# Patient Record
Sex: FEMALE | Race: WHITE | NOT HISPANIC OR LATINO | Employment: PART TIME | URBAN - METROPOLITAN AREA
[De-identification: names, ages, dates, MRNs, and addresses within clinical notes are randomized per-mention and may not be internally consistent; named-entity substitution may affect disease eponyms.]

---

## 2017-04-03 ENCOUNTER — HOSPITAL ENCOUNTER (OUTPATIENT)
Dept: RADIOLOGY | Facility: HOSPITAL | Age: 72
Discharge: HOME | End: 2017-04-03
Attending: OBSTETRICS & GYNECOLOGY
Payer: COMMERCIAL

## 2017-04-03 DIAGNOSIS — Z12.31 ENCOUNTER FOR SCREENING MAMMOGRAM FOR MALIGNANT NEOPLASM OF BREAST: ICD-10-CM

## 2017-04-03 PROCEDURE — G0202 SCR MAMMO BI INCL CAD: HCPCS

## 2017-06-12 ENCOUNTER — ALLSCRIPTS OFFICE VISIT (OUTPATIENT)
Dept: OTHER | Facility: OTHER | Age: 72
End: 2017-06-12

## 2017-06-12 DIAGNOSIS — R04.2 HEMOPTYSIS: ICD-10-CM

## 2017-06-19 ENCOUNTER — HOSPITAL ENCOUNTER (OUTPATIENT)
Dept: RADIOLOGY | Facility: HOSPITAL | Age: 72
Discharge: HOME | End: 2017-06-19
Payer: COMMERCIAL

## 2017-06-19 ENCOUNTER — TRANSCRIBE ORDERS (OUTPATIENT)
Dept: RADIOLOGY | Facility: HOSPITAL | Age: 72
End: 2017-06-19

## 2017-06-19 DIAGNOSIS — R04.2 COUGHING UP BLOOD: Primary | ICD-10-CM

## 2017-06-19 DIAGNOSIS — R04.2 COUGHING UP BLOOD: ICD-10-CM

## 2017-06-19 PROCEDURE — 71020 HB CHEST X-RAY 2VW FRONTAL&LATL: CPT

## 2017-06-27 ENCOUNTER — LAB CONVERSION - ENCOUNTER (OUTPATIENT)
Dept: OTHER | Facility: OTHER | Age: 72
End: 2017-06-27

## 2017-06-27 LAB
A/G RATIO (HISTORICAL): 1.3 (CALC) (ref 1–2.5)
ALBUMIN SERPL BCP-MCNC: 4.5 G/DL (ref 3.6–5.1)
ALP SERPL-CCNC: 80 U/L (ref 33–130)
ALT SERPL W P-5'-P-CCNC: 11 U/L (ref 6–29)
AST SERPL W P-5'-P-CCNC: 21 U/L (ref 10–35)
BASOPHILS # BLD AUTO: 0.4 %
BASOPHILS # BLD AUTO: 27 CELLS/UL (ref 0–200)
BILIRUB SERPL-MCNC: 0.7 MG/DL (ref 0.2–1.2)
BUN SERPL-MCNC: 11 MG/DL (ref 7–25)
BUN/CREA RATIO (HISTORICAL): NORMAL (CALC) (ref 6–22)
CALCIUM SERPL-MCNC: 9.7 MG/DL (ref 8.6–10.4)
CHLORIDE SERPL-SCNC: 103 MMOL/L (ref 98–110)
CHOLEST SERPL-MCNC: 220 MG/DL (ref 125–200)
CHOLEST/HDLC SERPL: 2.7 (CALC)
CO2 SERPL-SCNC: 28 MMOL/L (ref 20–31)
CREAT SERPL-MCNC: 0.64 MG/DL (ref 0.6–0.93)
DEPRECATED RDW RBC AUTO: 14.6 % (ref 11–15)
EGFR AFRICAN AMERICAN (HISTORICAL): 105 ML/MIN/1.73M2
EGFR-AMERICAN CALC (HISTORICAL): 90 ML/MIN/1.73M2
EOSINOPHIL # BLD AUTO: 1.3 %
EOSINOPHIL # BLD AUTO: 88 CELLS/UL (ref 15–500)
GAMMA GLOBULIN (HISTORICAL): 3.5 G/DL (CALC) (ref 1.9–3.7)
GLUCOSE (HISTORICAL): 84 MG/DL (ref 65–99)
HCT VFR BLD AUTO: 43 % (ref 35–45)
HDLC SERPL-MCNC: 82 MG/DL
HGB BLD-MCNC: 14.1 G/DL (ref 11.7–15.5)
LDL CHOLESTEROL (HISTORICAL): 118 MG/DL (CALC)
LYMPHOCYTES # BLD AUTO: 2033 CELLS/UL (ref 850–3900)
LYMPHOCYTES # BLD AUTO: 29.9 %
MCH RBC QN AUTO: 32 PG (ref 27–33)
MCHC RBC AUTO-ENTMCNC: 32.9 G/DL (ref 32–36)
MCV RBC AUTO: 97.4 FL (ref 80–100)
MONOCYTES # BLD AUTO: 530 CELLS/UL (ref 200–950)
MONOCYTES (HISTORICAL): 7.8 %
NEUTROPHILS # BLD AUTO: 4121 CELLS/UL (ref 1500–7800)
NEUTROPHILS # BLD AUTO: 60.6 %
NON-HDL-CHOL (CHOL-HDL) (HISTORICAL): 138 MG/DL (CALC)
PLATELET # BLD AUTO: 289 THOUSAND/UL (ref 140–400)
PMV BLD AUTO: 8.2 FL (ref 7.5–12.5)
POTASSIUM SERPL-SCNC: 3.9 MMOL/L (ref 3.5–5.3)
RBC # BLD AUTO: 4.42 MILLION/UL (ref 3.8–5.1)
SODIUM SERPL-SCNC: 141 MMOL/L (ref 135–146)
TOTAL PROTEIN (HISTORICAL): 8 G/DL (ref 6.1–8.1)
TRIGL SERPL-MCNC: 100 MG/DL
TSH SERPL DL<=0.05 MIU/L-ACNC: 0.55 MIU/L (ref 0.4–4.5)
WBC # BLD AUTO: 6.8 THOUSAND/UL (ref 3.8–10.8)

## 2017-06-28 ENCOUNTER — GENERIC CONVERSION - ENCOUNTER (OUTPATIENT)
Dept: OTHER | Facility: OTHER | Age: 72
End: 2017-06-28

## 2018-01-14 VITALS
RESPIRATION RATE: 16 BRPM | SYSTOLIC BLOOD PRESSURE: 120 MMHG | HEIGHT: 63 IN | WEIGHT: 111 LBS | HEART RATE: 70 BPM | TEMPERATURE: 98.1 F | BODY MASS INDEX: 19.67 KG/M2 | DIASTOLIC BLOOD PRESSURE: 60 MMHG

## 2018-01-15 NOTE — MISCELLANEOUS
Message   Recorded as Task   Date: 05/02/2016 02:11 PM, Created By: Annabella Phillip   Task Name: Go to Result   Assigned To: ALANNA GYN,Team   Regarding Patient: Ginny Malone, Status: In Progress   Comment:    Kiran Akhtarkana - 02 May 2016 2:11 PM     TASK CREATED  Please notify patient that her DEXA results showed osteopenia  We recommend weight-bearing exercise, calcium supplementation, and vitamin D supplementation  Kat Reed - 02 May 2016 2:22 PM     TASK IN PROGRESS   Kat Reed - 02 May 2016 2:24 PM     TASK EDITED  spoke with  (uche) on [facility] - he wrote down instructions for pt to start taking calcium and vit d    pt will call with any further questions        Active Problems    1  Anxiety (300 00) (F41 9)   2  Back pain (724 5) (M54 9)   3  Cataract (366 9) (H26 9)   4  Colon cancer screening (V76 51) (Z12 11)   5  Encounter for routine gynecological examination (V72 31) (Z01 419)   6  Encounter for screening mammogram for malignant neoplasm of breast (V76 12)   (Z12 31)   7  Eye pressure (379 99) (H57 9)   8  Hypertension (401 9) (I10)   9  Hypothyroidism (244 9) (E03 9)   10  Menopause (627 2) (Z78 0)   11  Sciatica (724 3) (M54 30)   12  Telangiectasias (448 9) (I78 1)   13  Visit for eye and vision exam (V72 0) (Z01 00)    Current Meds   1  Alphagan P 0 1 % Ophthalmic Solution; Therapy: (USSOBLBF:27AYU0572) to Recorded   2  ALPRAZolam 0 5 MG Oral Tablet; TAKE ONE (1) TABLET(S) TWICE DAILY ASNEEDED; Therapy: 55QBT7932 to (Evaluate:20Mar2016); Last Rx:74Cmy0044 Ordered   3  AmLODIPine Besylate 5 MG Oral Tablet; take 1 tablet by mouth once daily; Therapy: 14VRI3011 to (Evaluate:98Kyd1455)  Requested for: 84PIP9414; Last   Rx:[id number] Ordered   4  Citalopram Hydrobromide 10 MG Oral Tablet (CeleXA); TAKE 1 TABLET DAILY    Requested for: 95WMH9114; Last Rx:04Mar2016 Ordered   5  [facility] Vitamin E CAPS Recorded   6   Levoxyl 112 MCG Oral Tablet; TAKE 1 TABLET DAILY AS DIRECTED; Therapy: 33LPY5529 to (Evaluate:12Nrb7085)  Requested for: 44Qzu9652; Last   Rx:63Zyd8539 Ordered   7  Lumigan 0 01 % Ophthalmic Solution; Therapy: (LTYSODBC:87XFP4639) to Recorded   8  Multivitamins TABS Recorded   9  Tretinoin 0 05 % External Cream; use once daily at bedtime; Therapy: 65Sok6198 to (Last Rx:01Fnm9849)  Requested for: 53WFM2139 Ordered    Allergies    1   No Known Drug Allergies    Signatures   Electronically signed by : Jessica Akhtar, ; May  2 2016  2:24PM EST                       (Author)

## 2018-01-17 NOTE — RESULT NOTES
Discussion/Summary   no significant changes on bw     Verified Results  (1) CBC/PLT/DIFF 26AZW6532 56:50KJ Marino Mayen     Test Name Result Flag Reference   WHITE BLOOD CELL COUNT 6 8 Thousand/uL  3 8-10 8   RED BLOOD CELL COUNT 4 42 Million/uL  3 80-5 10   HEMOGLOBIN 14 1 g/dL  11 7-15 5   HEMATOCRIT 43 0 %  35 0-45 0   MCV 97 4 fL  80 0-100 0   MCH 32 0 pg  27 0-33 0   MCHC 32 9 g/dL  32 0-36 0   RDW 14 6 %  11 0-15 0   PLATELET COUNT 084 Thousand/uL  140-400   ABSOLUTE NEUTROPHILS 4121 cells/uL  5778-9754   ABSOLUTE LYMPHOCYTES 2033 cells/uL  850-3900   ABSOLUTE MONOCYTES 530 cells/uL  200-950   ABSOLUTE EOSINOPHILS 88 cells/uL     ABSOLUTE BASOPHILS 27 cells/uL  0-200   NEUTROPHILS 60 6 %     LYMPHOCYTES 29 9 %     MONOCYTES 7 8 %     EOSINOPHILS 1 3 %     BASOPHILS 0 4 %     MPV 8 2 fL  7 5-12 5     (1) COMPREHENSIVE METABOLIC PANEL 18UOL5946 21:96SF Marino Mayen     Test Name Result Flag Reference   GLUCOSE 84 mg/dL  65-99   Fasting reference interval   UREA NITROGEN (BUN) 11 mg/dL  7-25   CREATININE 0 64 mg/dL  0 60-0 93   For patients >52years of age, the reference limit  for Creatinine is approximately 13% higher for people  identified as -American  eGFR NON-AFR   AMERICAN 90 mL/min/1 73m2  > OR = 60   eGFR AFRICAN AMERICAN 105 mL/min/1 73m2  > OR = 60   BUN/CREATININE RATIO   2-98   NOT APPLICABLE (calc)   SODIUM 141 mmol/L  135-146   POTASSIUM 3 9 mmol/L  3 5-5 3   CHLORIDE 103 mmol/L     CARBON DIOXIDE 28 mmol/L  20-31   CALCIUM 9 7 mg/dL  8 6-10 4   PROTEIN, TOTAL 8 0 g/dL  6 1-8 1   ALBUMIN 4 5 g/dL  3 6-5 1   GLOBULIN 3 5 g/dL (calc)  1 9-3 7   ALBUMIN/GLOBULIN RATIO 1 3 (calc)  1 0-2 5   BILIRUBIN, TOTAL 0 7 mg/dL  0 2-1 2   ALKALINE PHOSPHATASE 80 U/L     AST 21 U/L  10-35   ALT 11 U/L  6-29     (1) LIPID PANEL, FASTING 56LOW0731 98:64DG Jasson Salinas     Test Name Result Flag Reference   CHOLESTEROL, TOTAL 220 mg/dL H 125-200   HDL CHOLESTEROL 82 mg/dL  > OR = 46 TRIGLICERIDES 017 mg/dL  <150   LDL-CHOLESTEROL 118 mg/dL (calc)  <130   Desirable range <100 mg/dL for patients with CHD or  diabetes and <70 mg/dL for diabetic patients with  known heart disease  CHOL/HDLC RATIO 2 7 (calc)  < OR = 5 0   NON HDL CHOLESTEROL 138 mg/dL (calc)     Target for non-HDL cholesterol is 30 mg/dL higher than   LDL cholesterol target       (Q) TSH, 3RD GENERATION [id number] 41:44TN Jasson Rodriguez   REPORT COMMENT:  FASTING:YES     Test Name Result Flag Reference   TSH 0 55 mIU/L  0 40-4 50

## 2018-02-15 DIAGNOSIS — F41.9 ANXIETY: Primary | ICD-10-CM

## 2018-02-15 DIAGNOSIS — I10 HYPERTENSION, UNSPECIFIED TYPE: ICD-10-CM

## 2018-02-15 RX ORDER — CITALOPRAM 10 MG/1
10 TABLET ORAL DAILY
Qty: 90 TABLET | Refills: 3 | Status: SHIPPED | OUTPATIENT
Start: 2018-02-15 | End: 2019-01-31 | Stop reason: SDUPTHER

## 2018-02-15 RX ORDER — CITALOPRAM 10 MG/1
1 TABLET ORAL DAILY
COMMUNITY
End: 2018-02-15 | Stop reason: SDUPTHER

## 2018-02-15 RX ORDER — AMLODIPINE BESYLATE 5 MG/1
5 TABLET ORAL DAILY
Qty: 90 TABLET | Refills: 3 | Status: SHIPPED | OUTPATIENT
Start: 2018-02-15 | End: 2019-02-15 | Stop reason: SDUPTHER

## 2018-02-15 RX ORDER — AMLODIPINE BESYLATE 5 MG/1
1 TABLET ORAL DAILY
COMMUNITY
Start: 2014-07-31 | End: 2018-02-15 | Stop reason: SDUPTHER

## 2018-03-09 DIAGNOSIS — E03.9 HYPOTHYROIDISM, UNSPECIFIED TYPE: Primary | ICD-10-CM

## 2018-03-09 RX ORDER — LEVOTHYROXINE SODIUM 112 UG/1
112 TABLET ORAL DAILY
Qty: 30 TABLET | Refills: 3 | Status: SHIPPED | OUTPATIENT
Start: 2018-03-09 | End: 2018-05-30 | Stop reason: DRUGHIGH

## 2018-03-09 RX ORDER — LEVOTHYROXINE SODIUM 112 UG/1
1 TABLET ORAL DAILY
COMMUNITY
Start: 2013-05-13 | End: 2018-03-09 | Stop reason: SDUPTHER

## 2018-04-24 ENCOUNTER — OFFICE VISIT (OUTPATIENT)
Dept: FAMILY MEDICINE CLINIC | Facility: CLINIC | Age: 73
End: 2018-04-24
Payer: COMMERCIAL

## 2018-04-24 VITALS
WEIGHT: 106.6 LBS | HEIGHT: 63 IN | DIASTOLIC BLOOD PRESSURE: 60 MMHG | TEMPERATURE: 96.3 F | HEART RATE: 76 BPM | BODY MASS INDEX: 18.89 KG/M2 | RESPIRATION RATE: 16 BRPM | SYSTOLIC BLOOD PRESSURE: 120 MMHG

## 2018-04-24 DIAGNOSIS — R05.9 COUGH: ICD-10-CM

## 2018-04-24 DIAGNOSIS — E03.9 HYPOTHYROIDISM, UNSPECIFIED TYPE: ICD-10-CM

## 2018-04-24 DIAGNOSIS — I10 ESSENTIAL HYPERTENSION: Primary | ICD-10-CM

## 2018-04-24 PROCEDURE — 3008F BODY MASS INDEX DOCD: CPT | Performed by: FAMILY MEDICINE

## 2018-04-24 PROCEDURE — 3074F SYST BP LT 130 MM HG: CPT | Performed by: FAMILY MEDICINE

## 2018-04-24 PROCEDURE — 99214 OFFICE O/P EST MOD 30 MIN: CPT | Performed by: FAMILY MEDICINE

## 2018-04-24 PROCEDURE — 3078F DIAST BP <80 MM HG: CPT | Performed by: FAMILY MEDICINE

## 2018-04-24 PROCEDURE — 4040F PNEUMOC VAC/ADMIN/RCVD: CPT | Performed by: FAMILY MEDICINE

## 2018-04-24 PROCEDURE — 1160F RVW MEDS BY RX/DR IN RCRD: CPT | Performed by: FAMILY MEDICINE

## 2018-04-24 RX ORDER — ALPRAZOLAM 0.5 MG/1
1 TABLET ORAL 2 TIMES DAILY PRN
COMMUNITY
Start: 2012-09-18 | End: 2018-05-30 | Stop reason: SDUPTHER

## 2018-04-24 NOTE — PROGRESS NOTES
FAMILY PRACTICE OFFICE VISIT       NAME: Garo Maradiaga  AGE: 72 y o  SEX: female       : 1945        MRN: [id number]    DATE: 2018  TIME: 3:13 PM    Assessment and Plan     Problem List Items Addressed This Visit     Cough    Relevant Orders    XR chest pa & lateral    Hypertension - Primary    Relevant Orders    CBC    Comprehensive metabolic panel    Lipid panel    TSH, 3rd generation    Hypothyroidism     Hypothyroidism  Patient was giving blood work for TSH level  She will continue with current dose of levothyroxine             Patient given sample of Symbicort 160/4 5 mcg to use 2 inhalations twice daily  If symptoms do not improve within the 1st 2 weeks she will obtain chest x-ray as ordered as well as blood test as ordered  Hypertension  Patient blood pressure is stable at this time and she will continue current regimen of medications  She will obtain blood work as ordered    There are no Patient Instructions on file for this visit  Chief Complaint     Chief Complaint   Patient presents with    Cough    poor appetite       History of Present Illness     Patient complain frequent coughing that becomes worse at night  She denies any fevers  She still smokes 1/2 pack of cigarettes per day  In the mornings the cough is productive but during the day that cough is more dry  Review of Systems   Review of Systems   Constitutional: Negative  Respiratory:        As per HPI   Cardiovascular: Negative  Gastrointestinal:        Patient complains of some loss of taste which has led to decreased eating and loss of 5 lb in the last year  No changes with bowel movements   Genitourinary: Negative  Musculoskeletal: Negative  Skin: Negative          Active Problem List     Patient Active Problem List   Diagnosis    Cough    Hypertension    Anxiety    Hypothyroidism       Past Medical History:  Past Medical History:   Diagnosis Date    Anxiety     Last Assessed: 2017  Depression     FH: colonic polyps     Glaucoma     Hypertension     Last Assessed: 12/12/2017    Hypothyroidism     Last Assessed: 6/12/2017    Menopause     Normal vaginal delivery     Osteopenia     Pap smear abnormality of cervix with ASCUS favoring benign     Telangiectasis        Past Surgical History:  Past Surgical History:   Procedure Laterality Date    CATARACT EXTRACTION      COLONOSCOPY      complete    DILATION AND CURETTAGE OF UTERUS  01/10/1978    LAPAROSCOPY      Diagnostic     TUBAL LIGATION         Family History:  Family History   Problem Relation Age of Onset    Heart disease Mother      Cardiac Disorder     Colonic polyp Mother     Hypertension Mother     Heart disease Father      Cardiac Dsiorder     Hypertension Father     Lung cancer Father     Breast cancer Sister     Esophageal cancer Sister     Stomach cancer Sister     Colonic polyp Brother     Hepatitis Daughter      Alcoholic    Arthritis Family     Lung cancer Family        Social History:  Social History     Social History    Marital status: /Civil Union     Spouse name: N/A    Number of children: N/A    Years of education: N/A     Occupational History    Not on file  Social History Main Topics    Smoking status: Current Some Day Smoker    Smokeless tobacco: Never Used      Comment: smokes and motivated to quit   Light cigarette smoke (1-9 cigarettes per day)     Alcohol use Yes      Comment: Being A Social Drinker - Drinks wine, social alcohol use     Drug use: No    Sexual activity: Yes     Other Topics Concern    Not on file     Social History Narrative    Drinks Coffee - 1-2 cup a day     Exercises Daily        Objective     Vitals:    04/24/18 1439   BP: 120/60   Pulse: 76   Resp: 16   Temp: (!) 96 3 °F (35 7 °C)     Wt Readings from Last 3 Encounters:   04/24/18 48 4 kg (106 lb 9 6 oz)   06/12/17 50 3 kg (111 lb)   06/08/16 50 8 kg (112 lb)       Physical Exam   Constitutional: No distress  HENT:   Mouth/Throat: Oropharynx is clear and moist  No oropharyngeal exudate  Tympanic membranes within normal limits bilaterally   Neck:   No carotid bruits   Cardiovascular:   Regular rate and rhythm with no murmurs   Pulmonary/Chest:   Diffuse expiratory wheezes noted bilaterally  Negative rhonchi or rales   Abdominal:   Abdomen is soft, nontender with positive bowel sounds  There is no rebound or guarding  No masses palpated   Musculoskeletal: She exhibits no edema  Lymphadenopathy:     She has no cervical adenopathy  Skin: No rash noted         Pertinent Laboratory/Diagnostic Studies:  Lab Results   Component Value Date    BUN 11 06/26/2017    CREATININE 0 64 06/26/2017    CALCIUM 9 7 06/26/2017     06/26/2017    K 3 9 06/26/2017    CO2 28 06/26/2017     06/26/2017     Lab Results   Component Value Date    ALT 11 06/26/2017    AST 21 06/26/2017    ALKPHOS 80 06/26/2017    BILITOT 0 7 06/26/2017       Lab Results   Component Value Date    WBC 6 8 06/26/2017    HGB 14 1 06/26/2017    HCT 43 0 06/26/2017    MCV 97 4 06/26/2017     06/26/2017       No results found for: TSH    Lab Results   Component Value Date    CHOL 220 (H) 06/26/2017     Lab Results   Component Value Date    TRIG 100 06/26/2017     Lab Results   Component Value Date    HDL 82 06/26/2017     No results found for: LDLCALC  No results found for: HGBA1C    Results for orders placed or performed in visit on 06/27/17   TSH, 3RD GENERATION (HISTORICAL)   Result Value Ref Range    TSH 3RD GENERATON 0 55 0 40 - 4 50 mIU/L       Orders Placed This Encounter   Procedures    XR chest pa & lateral    CBC    Comprehensive metabolic panel    Lipid panel    TSH, 3rd generation       ALLERGIES:  No Known Allergies    Current Medications     Current Outpatient Prescriptions   Medication Sig Dispense Refill    ALPRAZolam (XANAX) 0 5 mg tablet Take 1 tablet by mouth 2 (two) times a day as needed      amLODIPine (NORVASC) 5 mg tablet Take 1 tablet (5 mg total) by mouth daily 90 tablet 3    bimatoprost (LUMIGAN) 0 01 % ophthalmic drops Apply 1 drop to eye Daily      brimonidine (ALPHAGAN P) 0 1 % Apply 1 drop to eye every 12 (twelve) hours      citalopram (CeleXA) 10 mg tablet Take 1 tablet (10 mg total) by mouth daily 90 tablet 3    levothyroxine (LEVOXYL) 112 mcg tablet Take 1 tablet (112 mcg total) by mouth daily 30 tablet 3     No current facility-administered medications for this visit            Health Maintenance     Health Maintenance   Topic Date Due    Hepatitis C Screening  1945    SLP PLAN OF CARE  1945    COLONOSCOPY  1945    Depression Screening PHQ-9  02/06/1959    DTaP,Tdap,and Td Vaccines (1 - Tdap) 02/06/1968    Fall Risk  02/06/2012    Urinary Incontinence Screening  02/06/2012    GLAUCOMA SCREENING 67+ YR  02/06/2014    INFLUENZA VACCINE  09/01/2017    PNEUMOCOCCAL POLYSACCHARIDE VACCINE AGE 72 AND OVER  Completed     Immunization History   Administered Date(s) Administered    Influenza TIV (IM) 09/22/2016    Pneumococcal Polysaccharide PPV23 06/12/2017    Zoster 26/60/2745       Anita Diaz MD

## 2018-04-24 NOTE — ASSESSMENT & PLAN NOTE
Hypothyroidism  Patient was giving blood work for TSH level    She will continue with current dose of levothyroxine

## 2018-05-30 ENCOUNTER — TELEPHONE (OUTPATIENT)
Dept: FAMILY MEDICINE CLINIC | Facility: CLINIC | Age: 73
End: 2018-05-30

## 2018-05-30 DIAGNOSIS — E03.9 HYPOTHYROIDISM, UNSPECIFIED TYPE: Primary | ICD-10-CM

## 2018-05-30 DIAGNOSIS — F41.9 ANXIETY: Primary | ICD-10-CM

## 2018-05-30 LAB
ALBUMIN SERPL-MCNC: 4.2 G/DL (ref 3.6–5.1)
ALBUMIN/GLOB SERPL: 1.3 (CALC) (ref 1–2.5)
ALP SERPL-CCNC: 82 U/L (ref 33–130)
ALT SERPL-CCNC: 14 U/L (ref 6–29)
AST SERPL-CCNC: 20 U/L (ref 10–35)
BASOPHILS # BLD AUTO: 48 CELLS/UL (ref 0–200)
BASOPHILS NFR BLD AUTO: 0.7 %
BILIRUB SERPL-MCNC: 0.7 MG/DL (ref 0.2–1.2)
BUN SERPL-MCNC: 9 MG/DL (ref 7–25)
BUN/CREAT SERPL: NORMAL (CALC) (ref 6–22)
CALCIUM SERPL-MCNC: 9.6 MG/DL (ref 8.6–10.4)
CHLORIDE SERPL-SCNC: 101 MMOL/L (ref 98–110)
CHOLEST SERPL-MCNC: 218 MG/DL
CHOLEST/HDLC SERPL: 2.5 (CALC)
CO2 SERPL-SCNC: 29 MMOL/L (ref 20–31)
CREAT SERPL-MCNC: 0.65 MG/DL (ref 0.6–0.93)
EOSINOPHIL # BLD AUTO: 143 CELLS/UL (ref 15–500)
EOSINOPHIL NFR BLD AUTO: 2.1 %
ERYTHROCYTE [DISTWIDTH] IN BLOOD BY AUTOMATED COUNT: 13.1 % (ref 11–15)
GLOBULIN SER CALC-MCNC: 3.2 G/DL (CALC) (ref 1.9–3.7)
GLUCOSE SERPL-MCNC: 87 MG/DL (ref 65–99)
HCT VFR BLD AUTO: 44.5 % (ref 35–45)
HDLC SERPL-MCNC: 86 MG/DL
HGB BLD-MCNC: 15.6 G/DL (ref 11.7–15.5)
LDLC SERPL CALC-MCNC: 115 MG/DL (CALC)
LYMPHOCYTES # BLD AUTO: 2101 CELLS/UL (ref 850–3900)
LYMPHOCYTES NFR BLD AUTO: 30.9 %
MCH RBC QN AUTO: 33.1 PG (ref 27–33)
MCHC RBC AUTO-ENTMCNC: 35.1 G/DL (ref 32–36)
MCV RBC AUTO: 94.3 FL (ref 80–100)
MONOCYTES # BLD AUTO: 694 CELLS/UL (ref 200–950)
MONOCYTES NFR BLD AUTO: 10.2 %
NEUTROPHILS # BLD AUTO: 3815 CELLS/UL (ref 1500–7800)
NEUTROPHILS NFR BLD AUTO: 56.1 %
NONHDLC SERPL-MCNC: 132 MG/DL (CALC)
PLATELET # BLD AUTO: 269 THOUSAND/UL (ref 140–400)
PMV BLD REES-ECKER: 10.2 FL (ref 7.5–12.5)
POTASSIUM SERPL-SCNC: 4.2 MMOL/L (ref 3.5–5.3)
PROT SERPL-MCNC: 7.4 G/DL (ref 6.1–8.1)
RBC # BLD AUTO: 4.72 MILLION/UL (ref 3.8–5.1)
SL AMB EGFR AFRICAN AMERICAN: 104 ML/MIN/1.73M2
SL AMB EGFR NON AFRICAN AMERICAN: 89 ML/MIN/1.73M2
SODIUM SERPL-SCNC: 140 MMOL/L (ref 135–146)
TRIGL SERPL-MCNC: 78 MG/DL
TSH SERPL-ACNC: 0.01 MIU/L (ref 0.4–4.5)
WBC # BLD AUTO: 6.8 THOUSAND/UL (ref 3.8–10.8)

## 2018-05-30 RX ORDER — LEVOTHYROXINE SODIUM 0.07 MG/1
75 TABLET ORAL DAILY
Qty: 90 TABLET | Refills: 1 | Status: SHIPPED | OUTPATIENT
Start: 2018-05-30 | End: 2018-11-19 | Stop reason: SDUPTHER

## 2018-05-30 RX ORDER — ALPRAZOLAM 0.5 MG/1
TABLET ORAL
Qty: 60 TABLET | Refills: 2 | Status: SHIPPED | OUTPATIENT
Start: 2018-05-30 | End: 2018-10-07 | Stop reason: SDUPTHER

## 2018-05-30 NOTE — TELEPHONE ENCOUNTER
----- Message from Althea Medrano MD sent at 6/21/9531  7:34 AM EDT -----  Blood work shows she needs adjustment in thyroid medicine from 112 mcg to 75 mcg tablet  I will send new prescription to pharmacy  Recheck blood work in 3 months    I will order in epic

## 2018-06-21 ENCOUNTER — TELEPHONE (OUTPATIENT)
Dept: FAMILY MEDICINE CLINIC | Facility: CLINIC | Age: 73
End: 2018-06-21

## 2018-06-21 NOTE — TELEPHONE ENCOUNTER
Patient would like to know if we have any samples of Symbicort  She states that this has been helping her but is almost out    Please call and advise

## 2018-06-26 ENCOUNTER — HOSPITAL ENCOUNTER (OUTPATIENT)
Dept: RADIOLOGY | Facility: HOSPITAL | Age: 73
Discharge: HOME | End: 2018-06-26
Payer: COMMERCIAL

## 2018-06-26 ENCOUNTER — TRANSCRIBE ORDERS (OUTPATIENT)
Dept: RADIOLOGY | Facility: HOSPITAL | Age: 73
End: 2018-06-26

## 2018-06-26 DIAGNOSIS — R05.9 COUGH: ICD-10-CM

## 2018-06-26 PROCEDURE — 71046 X-RAY EXAM CHEST 2 VIEWS: CPT

## 2018-06-29 ENCOUNTER — TELEPHONE (OUTPATIENT)
Dept: FAMILY MEDICINE CLINIC | Facility: CLINIC | Age: 73
End: 2018-06-29

## 2018-06-29 NOTE — TELEPHONE ENCOUNTER
----- Message from Tere Fields MD sent at 1/83/7256 10:16 AM EDT -----  Chest x-ray showed lungs are clear

## 2018-10-07 DIAGNOSIS — F41.9 ANXIETY: ICD-10-CM

## 2018-10-07 RX ORDER — ALPRAZOLAM 0.5 MG/1
TABLET ORAL
Qty: 60 TABLET | Refills: 2 | Status: SHIPPED | OUTPATIENT
Start: 2018-10-07 | End: 2019-02-11 | Stop reason: SDUPTHER

## 2018-10-09 LAB — TSH SERPL-ACNC: 2.4 MIU/L (ref 0.4–4.5)

## 2018-10-15 ENCOUNTER — TELEPHONE (OUTPATIENT)
Dept: FAMILY MEDICINE CLINIC | Facility: CLINIC | Age: 73
End: 2018-10-15

## 2018-10-15 NOTE — TELEPHONE ENCOUNTER
Patient calling for lab results & thinks she hasn't been feeling well since she has been taking the levothyroxine 75 mcg tablet  Please call

## 2018-10-15 NOTE — TELEPHONE ENCOUNTER
Thyroid function tests are now in the normal range compared to previously where she had been receiving too much levothyroxine

## 2018-11-19 DIAGNOSIS — E03.9 HYPOTHYROIDISM, UNSPECIFIED TYPE: ICD-10-CM

## 2018-11-19 RX ORDER — LEVOTHYROXINE SODIUM 0.07 MG/1
75 TABLET ORAL DAILY
Qty: 90 TABLET | Refills: 1 | Status: SHIPPED | OUTPATIENT
Start: 2018-11-19 | End: 2019-05-17 | Stop reason: SDUPTHER

## 2019-01-31 DIAGNOSIS — F41.9 ANXIETY: ICD-10-CM

## 2019-01-31 RX ORDER — CITALOPRAM 10 MG/1
10 TABLET ORAL DAILY
Qty: 90 TABLET | Refills: 3 | Status: SHIPPED | OUTPATIENT
Start: 2019-01-31 | End: 2020-01-17

## 2019-02-11 DIAGNOSIS — F41.9 ANXIETY: ICD-10-CM

## 2019-02-11 RX ORDER — ALPRAZOLAM 0.5 MG/1
TABLET ORAL
Qty: 60 TABLET | Refills: 2 | Status: SHIPPED | OUTPATIENT
Start: 2019-02-11 | End: 2019-06-24 | Stop reason: SDUPTHER

## 2019-02-15 DIAGNOSIS — I10 HYPERTENSION, UNSPECIFIED TYPE: ICD-10-CM

## 2019-02-15 RX ORDER — AMLODIPINE BESYLATE 5 MG/1
5 TABLET ORAL DAILY
Qty: 90 TABLET | Refills: 0 | Status: SHIPPED | OUTPATIENT
Start: 2019-02-15 | End: 2019-05-17 | Stop reason: SDUPTHER

## 2019-05-09 DIAGNOSIS — E03.9 HYPOTHYROIDISM, UNSPECIFIED TYPE: ICD-10-CM

## 2019-05-09 RX ORDER — LEVOTHYROXINE SODIUM 0.07 MG/1
75 TABLET ORAL DAILY
Qty: 90 TABLET | Refills: 1 | OUTPATIENT
Start: 2019-05-09

## 2019-05-13 DIAGNOSIS — E03.9 HYPOTHYROIDISM, UNSPECIFIED TYPE: ICD-10-CM

## 2019-05-13 RX ORDER — LEVOTHYROXINE SODIUM 0.07 MG/1
75 TABLET ORAL DAILY
Qty: 90 TABLET | Refills: 1 | OUTPATIENT
Start: 2019-05-13

## 2019-05-17 ENCOUNTER — OFFICE VISIT (OUTPATIENT)
Dept: FAMILY MEDICINE CLINIC | Facility: CLINIC | Age: 74
End: 2019-05-17
Payer: COMMERCIAL

## 2019-05-17 VITALS
OXYGEN SATURATION: 92 % | WEIGHT: 109.5 LBS | RESPIRATION RATE: 16 BRPM | BODY MASS INDEX: 20.15 KG/M2 | SYSTOLIC BLOOD PRESSURE: 110 MMHG | TEMPERATURE: 97.6 F | HEIGHT: 62 IN | HEART RATE: 76 BPM | DIASTOLIC BLOOD PRESSURE: 90 MMHG

## 2019-05-17 DIAGNOSIS — F41.9 ANXIETY: ICD-10-CM

## 2019-05-17 DIAGNOSIS — Z23 NEED FOR PNEUMOCOCCAL VACCINE: ICD-10-CM

## 2019-05-17 DIAGNOSIS — E03.9 HYPOTHYROIDISM, UNSPECIFIED TYPE: ICD-10-CM

## 2019-05-17 DIAGNOSIS — Z00.00 PERIODIC HEALTH ASSESSMENT, GENERAL SCREENING, ADULT: Primary | ICD-10-CM

## 2019-05-17 DIAGNOSIS — I10 HYPERTENSION, UNSPECIFIED TYPE: ICD-10-CM

## 2019-05-17 PROBLEM — R05.9 COUGH: Status: RESOLVED | Noted: 2018-04-24 | Resolved: 2019-05-17

## 2019-05-17 PROCEDURE — 3725F SCREEN DEPRESSION PERFORMED: CPT | Performed by: FAMILY MEDICINE

## 2019-05-17 PROCEDURE — 1170F FXNL STATUS ASSESSED: CPT | Performed by: FAMILY MEDICINE

## 2019-05-17 PROCEDURE — 1160F RVW MEDS BY RX/DR IN RCRD: CPT | Performed by: FAMILY MEDICINE

## 2019-05-17 PROCEDURE — 4040F PNEUMOC VAC/ADMIN/RCVD: CPT | Performed by: FAMILY MEDICINE

## 2019-05-17 PROCEDURE — 99214 OFFICE O/P EST MOD 30 MIN: CPT | Performed by: FAMILY MEDICINE

## 2019-05-17 PROCEDURE — G0009 ADMIN PNEUMOCOCCAL VACCINE: HCPCS | Performed by: FAMILY MEDICINE

## 2019-05-17 PROCEDURE — 3008F BODY MASS INDEX DOCD: CPT | Performed by: FAMILY MEDICINE

## 2019-05-17 PROCEDURE — 1125F AMNT PAIN NOTED PAIN PRSNT: CPT | Performed by: FAMILY MEDICINE

## 2019-05-17 PROCEDURE — 1101F PT FALLS ASSESS-DOCD LE1/YR: CPT | Performed by: FAMILY MEDICINE

## 2019-05-17 PROCEDURE — G0439 PPPS, SUBSEQ VISIT: HCPCS | Performed by: FAMILY MEDICINE

## 2019-05-17 PROCEDURE — 90670 PCV13 VACCINE IM: CPT | Performed by: FAMILY MEDICINE

## 2019-05-17 RX ORDER — LEVOTHYROXINE SODIUM 0.07 MG/1
75 TABLET ORAL DAILY
Qty: 90 TABLET | Refills: 3 | Status: SHIPPED | OUTPATIENT
Start: 2019-05-17 | End: 2020-01-31

## 2019-05-17 RX ORDER — AMLODIPINE BESYLATE 5 MG/1
5 TABLET ORAL DAILY
Qty: 90 TABLET | Refills: 3 | Status: SHIPPED | OUTPATIENT
Start: 2019-05-17 | End: 2020-02-21

## 2019-06-24 DIAGNOSIS — F41.9 ANXIETY: ICD-10-CM

## 2019-06-24 RX ORDER — ALPRAZOLAM 0.5 MG/1
TABLET ORAL
Qty: 60 TABLET | Refills: 2 | Status: SHIPPED | OUTPATIENT
Start: 2019-06-24 | End: 2019-10-14 | Stop reason: SDUPTHER

## 2019-08-20 LAB
ALBUMIN SERPL-MCNC: 4.5 G/DL (ref 3.6–5.1)
ALBUMIN/GLOB SERPL: 1.4 (CALC) (ref 1–2.5)
ALP SERPL-CCNC: 89 U/L (ref 33–130)
ALT SERPL-CCNC: 17 U/L (ref 6–29)
AST SERPL-CCNC: 27 U/L (ref 10–35)
BASOPHILS # BLD AUTO: 41 CELLS/UL (ref 0–200)
BASOPHILS NFR BLD AUTO: 0.6 %
BILIRUB SERPL-MCNC: 0.8 MG/DL (ref 0.2–1.2)
BUN SERPL-MCNC: 7 MG/DL (ref 7–25)
BUN/CREAT SERPL: NORMAL (CALC) (ref 6–22)
CALCIUM SERPL-MCNC: 9.7 MG/DL (ref 8.6–10.4)
CHLORIDE SERPL-SCNC: 101 MMOL/L (ref 98–110)
CHOLEST SERPL-MCNC: 217 MG/DL
CHOLEST/HDLC SERPL: 2.8 (CALC)
CO2 SERPL-SCNC: 32 MMOL/L (ref 20–32)
CREAT SERPL-MCNC: 0.76 MG/DL (ref 0.6–0.93)
EOSINOPHIL # BLD AUTO: 152 CELLS/UL (ref 15–500)
EOSINOPHIL NFR BLD AUTO: 2.2 %
ERYTHROCYTE [DISTWIDTH] IN BLOOD BY AUTOMATED COUNT: 13.6 % (ref 11–15)
GLOBULIN SER CALC-MCNC: 3.2 G/DL (CALC) (ref 1.9–3.7)
GLUCOSE SERPL-MCNC: 88 MG/DL (ref 65–99)
HCT VFR BLD AUTO: 48.1 % (ref 35–45)
HDLC SERPL-MCNC: 77 MG/DL
HGB BLD-MCNC: 16.6 G/DL (ref 11.7–15.5)
LDLC SERPL CALC-MCNC: 118 MG/DL (CALC)
LYMPHOCYTES # BLD AUTO: 2284 CELLS/UL (ref 850–3900)
LYMPHOCYTES NFR BLD AUTO: 33.1 %
MCH RBC QN AUTO: 33.5 PG (ref 27–33)
MCHC RBC AUTO-ENTMCNC: 34.5 G/DL (ref 32–36)
MCV RBC AUTO: 97.2 FL (ref 80–100)
MONOCYTES # BLD AUTO: 642 CELLS/UL (ref 200–950)
MONOCYTES NFR BLD AUTO: 9.3 %
NEUTROPHILS # BLD AUTO: 3781 CELLS/UL (ref 1500–7800)
NEUTROPHILS NFR BLD AUTO: 54.8 %
NONHDLC SERPL-MCNC: 140 MG/DL (CALC)
PLATELET # BLD AUTO: 309 THOUSAND/UL (ref 140–400)
PMV BLD REES-ECKER: 10.2 FL (ref 7.5–12.5)
POTASSIUM SERPL-SCNC: 3.9 MMOL/L (ref 3.5–5.3)
PROT SERPL-MCNC: 7.7 G/DL (ref 6.1–8.1)
RBC # BLD AUTO: 4.95 MILLION/UL (ref 3.8–5.1)
SL AMB EGFR AFRICAN AMERICAN: 91 ML/MIN/1.73M2
SL AMB EGFR NON AFRICAN AMERICAN: 78 ML/MIN/1.73M2
SODIUM SERPL-SCNC: 141 MMOL/L (ref 135–146)
TRIGL SERPL-MCNC: 109 MG/DL
TSH SERPL-ACNC: 2.77 MIU/L (ref 0.4–4.5)
WBC # BLD AUTO: 6.9 THOUSAND/UL (ref 3.8–10.8)

## 2019-10-14 DIAGNOSIS — F41.9 ANXIETY: ICD-10-CM

## 2019-10-14 RX ORDER — ALPRAZOLAM 0.5 MG/1
TABLET ORAL
Qty: 60 TABLET | Refills: 2 | Status: SHIPPED | OUTPATIENT
Start: 2019-10-14 | End: 2020-03-03

## 2019-10-29 ENCOUNTER — HOSPITAL ENCOUNTER (OUTPATIENT)
Dept: RADIOLOGY | Facility: HOSPITAL | Age: 74
Discharge: HOME | End: 2019-10-29
Payer: COMMERCIAL

## 2019-10-29 VITALS — BODY MASS INDEX: 20.06 KG/M2 | HEIGHT: 62 IN | WEIGHT: 109 LBS

## 2019-10-29 DIAGNOSIS — Z00.00 PERIODIC HEALTH ASSESSMENT, GENERAL SCREENING, ADULT: ICD-10-CM

## 2019-10-29 PROCEDURE — 77067 SCR MAMMO BI INCL CAD: CPT

## 2020-01-17 DIAGNOSIS — F41.9 ANXIETY: ICD-10-CM

## 2020-01-17 RX ORDER — CITALOPRAM 10 MG/1
TABLET ORAL
Qty: 90 TABLET | Refills: 1 | Status: SHIPPED | OUTPATIENT
Start: 2020-01-17 | End: 2020-07-13

## 2020-01-31 DIAGNOSIS — E03.9 HYPOTHYROIDISM, UNSPECIFIED TYPE: ICD-10-CM

## 2020-01-31 RX ORDER — LEVOTHYROXINE SODIUM 0.07 MG/1
TABLET ORAL
Qty: 90 TABLET | Refills: 0 | Status: SHIPPED | OUTPATIENT
Start: 2020-01-31 | End: 2020-05-01 | Stop reason: SDUPTHER

## 2020-02-21 DIAGNOSIS — I10 HYPERTENSION, UNSPECIFIED TYPE: ICD-10-CM

## 2020-02-21 RX ORDER — AMLODIPINE BESYLATE 5 MG/1
TABLET ORAL
Qty: 90 TABLET | Refills: 1 | Status: SHIPPED | OUTPATIENT
Start: 2020-02-21 | End: 2020-07-27

## 2020-03-03 DIAGNOSIS — F41.9 ANXIETY: ICD-10-CM

## 2020-03-03 RX ORDER — ALPRAZOLAM 0.5 MG/1
TABLET ORAL
Qty: 60 TABLET | Refills: 1 | Status: SHIPPED | OUTPATIENT
Start: 2020-03-03 | End: 2020-05-29 | Stop reason: SDUPTHER

## 2020-05-01 DIAGNOSIS — E03.9 HYPOTHYROIDISM, UNSPECIFIED TYPE: ICD-10-CM

## 2020-05-01 RX ORDER — LEVOTHYROXINE SODIUM 0.07 MG/1
75 TABLET ORAL DAILY
Qty: 90 TABLET | Refills: 0 | Status: SHIPPED | OUTPATIENT
Start: 2020-05-01 | End: 2020-10-27 | Stop reason: SDUPTHER

## 2020-05-22 DIAGNOSIS — F41.9 ANXIETY: ICD-10-CM

## 2020-05-22 RX ORDER — ALPRAZOLAM 0.5 MG/1
0.5 TABLET ORAL 2 TIMES DAILY PRN
Qty: 60 TABLET | Refills: 1 | OUTPATIENT
Start: 2020-05-22

## 2020-05-23 DIAGNOSIS — F41.9 ANXIETY: ICD-10-CM

## 2020-05-25 RX ORDER — ALPRAZOLAM 0.5 MG/1
TABLET ORAL
Qty: 60 TABLET | OUTPATIENT
Start: 2020-05-25

## 2020-05-27 ENCOUNTER — TELEPHONE (OUTPATIENT)
Dept: FAMILY MEDICINE CLINIC | Facility: CLINIC | Age: 75
End: 2020-05-27

## 2020-05-29 ENCOUNTER — OFFICE VISIT (OUTPATIENT)
Dept: FAMILY MEDICINE CLINIC | Facility: CLINIC | Age: 75
End: 2020-05-29
Payer: COMMERCIAL

## 2020-05-29 VITALS
OXYGEN SATURATION: 97 % | RESPIRATION RATE: 16 BRPM | WEIGHT: 106.25 LBS | HEIGHT: 62 IN | BODY MASS INDEX: 19.55 KG/M2 | SYSTOLIC BLOOD PRESSURE: 130 MMHG | DIASTOLIC BLOOD PRESSURE: 80 MMHG | TEMPERATURE: 96.1 F | HEART RATE: 87 BPM

## 2020-05-29 DIAGNOSIS — F41.9 ANXIETY: Primary | ICD-10-CM

## 2020-05-29 DIAGNOSIS — E03.9 HYPOTHYROIDISM, UNSPECIFIED TYPE: ICD-10-CM

## 2020-05-29 DIAGNOSIS — I10 HYPERTENSION, UNSPECIFIED TYPE: ICD-10-CM

## 2020-05-29 PROCEDURE — 99214 OFFICE O/P EST MOD 30 MIN: CPT | Performed by: FAMILY MEDICINE

## 2020-05-29 PROCEDURE — 3008F BODY MASS INDEX DOCD: CPT | Performed by: FAMILY MEDICINE

## 2020-05-29 PROCEDURE — 3079F DIAST BP 80-89 MM HG: CPT | Performed by: FAMILY MEDICINE

## 2020-05-29 PROCEDURE — 3075F SYST BP GE 130 - 139MM HG: CPT | Performed by: FAMILY MEDICINE

## 2020-05-29 PROCEDURE — 1125F AMNT PAIN NOTED PAIN PRSNT: CPT | Performed by: FAMILY MEDICINE

## 2020-05-29 PROCEDURE — 4040F PNEUMOC VAC/ADMIN/RCVD: CPT | Performed by: FAMILY MEDICINE

## 2020-05-29 PROCEDURE — G0439 PPPS, SUBSEQ VISIT: HCPCS | Performed by: FAMILY MEDICINE

## 2020-05-29 PROCEDURE — 1170F FXNL STATUS ASSESSED: CPT | Performed by: FAMILY MEDICINE

## 2020-05-29 RX ORDER — ALPRAZOLAM 0.5 MG/1
0.5 TABLET ORAL 2 TIMES DAILY PRN
Qty: 60 TABLET | Refills: 5 | Status: SHIPPED | OUTPATIENT
Start: 2020-05-29 | End: 2020-11-10

## 2020-07-13 DIAGNOSIS — F41.9 ANXIETY: ICD-10-CM

## 2020-07-13 RX ORDER — CITALOPRAM 10 MG/1
TABLET ORAL
Qty: 90 TABLET | Refills: 1 | Status: SHIPPED | OUTPATIENT
Start: 2020-07-13 | End: 2021-01-25

## 2020-07-27 DIAGNOSIS — I10 HYPERTENSION, UNSPECIFIED TYPE: ICD-10-CM

## 2020-07-27 RX ORDER — AMLODIPINE BESYLATE 5 MG/1
TABLET ORAL
Qty: 90 TABLET | Refills: 1 | Status: SHIPPED | OUTPATIENT
Start: 2020-07-27 | End: 2020-11-10 | Stop reason: SDUPTHER

## 2020-08-04 ENCOUNTER — TELEPHONE (OUTPATIENT)
Dept: FAMILY MEDICINE CLINIC | Facility: CLINIC | Age: 75
End: 2020-08-04

## 2020-08-04 LAB
ALBUMIN SERPL-MCNC: 4.4 G/DL (ref 3.6–5.1)
ALBUMIN/GLOB SERPL: 1.5 (CALC) (ref 1–2.5)
ALP SERPL-CCNC: 89 U/L (ref 37–153)
ALT SERPL-CCNC: 16 U/L (ref 6–29)
AST SERPL-CCNC: 28 U/L (ref 10–35)
BASOPHILS # BLD AUTO: 47 CELLS/UL (ref 0–200)
BASOPHILS NFR BLD AUTO: 0.8 %
BILIRUB SERPL-MCNC: 0.7 MG/DL (ref 0.2–1.2)
BUN SERPL-MCNC: 5 MG/DL (ref 7–25)
BUN/CREAT SERPL: 9 (CALC) (ref 6–22)
CALCIUM SERPL-MCNC: 9.5 MG/DL (ref 8.6–10.4)
CHLORIDE SERPL-SCNC: 100 MMOL/L (ref 98–110)
CHOLEST SERPL-MCNC: 185 MG/DL
CHOLEST/HDLC SERPL: 2.9 (CALC)
CO2 SERPL-SCNC: 30 MMOL/L (ref 20–32)
CREAT SERPL-MCNC: 0.54 MG/DL (ref 0.6–0.93)
EOSINOPHIL # BLD AUTO: 148 CELLS/UL (ref 15–500)
EOSINOPHIL NFR BLD AUTO: 2.5 %
ERYTHROCYTE [DISTWIDTH] IN BLOOD BY AUTOMATED COUNT: 13.2 % (ref 11–15)
GLOBULIN SER CALC-MCNC: 2.9 G/DL (CALC) (ref 1.9–3.7)
GLUCOSE SERPL-MCNC: 86 MG/DL (ref 65–99)
HCT VFR BLD AUTO: 43.9 % (ref 35–45)
HDLC SERPL-MCNC: 64 MG/DL
HGB BLD-MCNC: 14.8 G/DL (ref 11.7–15.5)
LDLC SERPL CALC-MCNC: 100 MG/DL (CALC)
LYMPHOCYTES # BLD AUTO: 2342 CELLS/UL (ref 850–3900)
LYMPHOCYTES NFR BLD AUTO: 39.7 %
MCH RBC QN AUTO: 32.3 PG (ref 27–33)
MCHC RBC AUTO-ENTMCNC: 33.7 G/DL (ref 32–36)
MCV RBC AUTO: 95.9 FL (ref 80–100)
MONOCYTES # BLD AUTO: 578 CELLS/UL (ref 200–950)
MONOCYTES NFR BLD AUTO: 9.8 %
NEUTROPHILS # BLD AUTO: 2785 CELLS/UL (ref 1500–7800)
NEUTROPHILS NFR BLD AUTO: 47.2 %
NONHDLC SERPL-MCNC: 121 MG/DL (CALC)
PLATELET # BLD AUTO: 306 THOUSAND/UL (ref 140–400)
PMV BLD REES-ECKER: 10.3 FL (ref 7.5–12.5)
POTASSIUM SERPL-SCNC: 4 MMOL/L (ref 3.5–5.3)
PROT SERPL-MCNC: 7.3 G/DL (ref 6.1–8.1)
RBC # BLD AUTO: 4.58 MILLION/UL (ref 3.8–5.1)
SL AMB EGFR AFRICAN AMERICAN: 108 ML/MIN/1.73M2
SL AMB EGFR NON AFRICAN AMERICAN: 94 ML/MIN/1.73M2
SODIUM SERPL-SCNC: 139 MMOL/L (ref 135–146)
TRIGL SERPL-MCNC: 117 MG/DL
TSH SERPL-ACNC: 1.04 MIU/L (ref 0.4–4.5)
WBC # BLD AUTO: 5.9 THOUSAND/UL (ref 3.8–10.8)

## 2020-08-04 NOTE — TELEPHONE ENCOUNTER
----- Message from Dayton Hampton MD sent at 4/9/5418  6:48 AM EDT -----  All recent blood work stable for patient

## 2020-10-27 DIAGNOSIS — E03.9 HYPOTHYROIDISM, UNSPECIFIED TYPE: ICD-10-CM

## 2020-10-27 RX ORDER — LEVOTHYROXINE SODIUM 0.07 MG/1
75 TABLET ORAL DAILY
Qty: 90 TABLET | Refills: 1 | Status: SHIPPED | OUTPATIENT
Start: 2020-10-27 | End: 2021-01-25

## 2020-11-10 DIAGNOSIS — I10 HYPERTENSION, UNSPECIFIED TYPE: ICD-10-CM

## 2020-11-10 DIAGNOSIS — F41.9 ANXIETY: ICD-10-CM

## 2020-11-10 RX ORDER — ALPRAZOLAM 0.5 MG/1
0.5 TABLET ORAL 2 TIMES DAILY PRN
Qty: 60 TABLET | Refills: 3 | Status: SHIPPED | OUTPATIENT
Start: 2020-11-10 | End: 2020-12-18 | Stop reason: SDUPTHER

## 2020-11-10 RX ORDER — AMLODIPINE BESYLATE 5 MG/1
5 TABLET ORAL DAILY
Qty: 90 TABLET | Refills: 1 | Status: SHIPPED | OUTPATIENT
Start: 2020-11-10 | End: 2021-05-17 | Stop reason: SDUPTHER

## 2020-12-18 DIAGNOSIS — F41.9 ANXIETY: ICD-10-CM

## 2020-12-18 RX ORDER — ALPRAZOLAM 0.5 MG/1
0.5 TABLET ORAL 2 TIMES DAILY PRN
Qty: 180 TABLET | Refills: 1 | Status: SHIPPED | OUTPATIENT
Start: 2020-12-18 | End: 2021-05-17 | Stop reason: SDUPTHER

## 2021-01-25 DIAGNOSIS — F41.9 ANXIETY: ICD-10-CM

## 2021-01-25 DIAGNOSIS — E03.9 HYPOTHYROIDISM, UNSPECIFIED TYPE: ICD-10-CM

## 2021-01-25 RX ORDER — CITALOPRAM 10 MG/1
TABLET ORAL
Qty: 90 TABLET | Refills: 1 | Status: SHIPPED | OUTPATIENT
Start: 2021-01-25 | End: 2021-06-01 | Stop reason: SDUPTHER

## 2021-01-25 RX ORDER — LEVOTHYROXINE SODIUM 0.07 MG/1
75 TABLET ORAL DAILY
Qty: 90 TABLET | Refills: 1 | Status: SHIPPED | OUTPATIENT
Start: 2021-01-25 | End: 2021-04-12 | Stop reason: SDUPTHER

## 2021-03-01 ENCOUNTER — PREPPED CHART (OUTPATIENT)
Dept: URBAN - METROPOLITAN AREA CLINIC 6 | Facility: CLINIC | Age: 76
End: 2021-03-01

## 2021-04-12 DIAGNOSIS — E03.9 HYPOTHYROIDISM, UNSPECIFIED TYPE: ICD-10-CM

## 2021-04-12 RX ORDER — LEVOTHYROXINE SODIUM 0.07 MG/1
75 TABLET ORAL DAILY
Qty: 90 TABLET | Refills: 0 | Status: SHIPPED | OUTPATIENT
Start: 2021-04-12 | End: 2021-06-01 | Stop reason: SDUPTHER

## 2021-05-17 ENCOUNTER — IMMUNIZATIONS (OUTPATIENT)
Dept: FAMILY MEDICINE CLINIC | Facility: HOSPITAL | Age: 76
End: 2021-05-17

## 2021-05-17 DIAGNOSIS — Z23 ENCOUNTER FOR IMMUNIZATION: Primary | ICD-10-CM

## 2021-05-17 DIAGNOSIS — I10 HYPERTENSION, UNSPECIFIED TYPE: ICD-10-CM

## 2021-05-17 DIAGNOSIS — F41.9 ANXIETY: ICD-10-CM

## 2021-05-17 PROCEDURE — 91300 SARS-COV-2 / COVID-19 MRNA VACCINE (PFIZER-BIONTECH) 30 MCG: CPT

## 2021-05-17 PROCEDURE — 0001A SARS-COV-2 / COVID-19 MRNA VACCINE (PFIZER-BIONTECH) 30 MCG: CPT

## 2021-05-17 RX ORDER — ALPRAZOLAM 0.5 MG/1
0.5 TABLET ORAL 2 TIMES DAILY PRN
Qty: 180 TABLET | Refills: 0 | Status: SHIPPED | OUTPATIENT
Start: 2021-05-17 | End: 2021-06-01 | Stop reason: SDUPTHER

## 2021-05-17 RX ORDER — AMLODIPINE BESYLATE 5 MG/1
5 TABLET ORAL DAILY
Qty: 90 TABLET | Refills: 0 | Status: SHIPPED | OUTPATIENT
Start: 2021-05-17 | End: 2021-06-01 | Stop reason: SDUPTHER

## 2021-05-24 ENCOUNTER — RA CDI HCC (OUTPATIENT)
Dept: OTHER | Facility: HOSPITAL | Age: 76
End: 2021-05-24

## 2021-05-24 NOTE — PROGRESS NOTES
Assessment & Plan:     E03 9 Hypothyroidism    I10 Hypertension    F41 9 Anxiety    F17 200 Nicotine dependence, unspecified, uncomplicated         Subjective:     Patient ID: Christelle Cross is a 78 y o  female     No chief complaint on file  HPI    Review of Systems    Objective: There were no vitals taken for this visit      Problem List Items Addressed This Visit     None          Physical Exam

## 2021-05-24 NOTE — PROGRESS NOTES
[facility]  coding opportunities             Chart reviewed, (number of) suggestions sent to provider: 1           Patients insurance company: [employer] (Medicare Advantage and [employer])     Visit status: Patient arrived for their scheduled appointment   dx F33 9 on bill  Provider never responded to [employer]  coding request     [facility]  coding opportunities             Chart reviewed, (number of) suggestions sent to provider: 1      DX: It is noted in the patient record that the patient has depression as hx and anxiety  On Celexa, unsure on current status of depression or if it's only being rx for anxiety      Can the depression be further specified as:     F32 0 major depressive disorder, single episode, mild  OR   F32 1 major depressive disorder, single episode, moderate  OR   F32 2 major depressive disorder, single episode, severe without psychotic features OR   F32 4 major depressive disorder, single episode, in partial remission OR   F32 5 major depressive disorder, single episode, in full remission         Patients insurance company: [employer] (Medicare Advantage and [employer])

## 2021-06-01 ENCOUNTER — OFFICE VISIT (OUTPATIENT)
Dept: FAMILY MEDICINE CLINIC | Facility: CLINIC | Age: 76
End: 2021-06-01
Payer: COMMERCIAL

## 2021-06-01 VITALS
OXYGEN SATURATION: 94 % | DIASTOLIC BLOOD PRESSURE: 60 MMHG | BODY MASS INDEX: 19.14 KG/M2 | RESPIRATION RATE: 15 BRPM | HEIGHT: 62 IN | WEIGHT: 104 LBS | HEART RATE: 103 BPM | SYSTOLIC BLOOD PRESSURE: 104 MMHG | TEMPERATURE: 98 F

## 2021-06-01 DIAGNOSIS — F33.9 DEPRESSION, RECURRENT (HCC): ICD-10-CM

## 2021-06-01 DIAGNOSIS — E03.9 HYPOTHYROIDISM, UNSPECIFIED TYPE: ICD-10-CM

## 2021-06-01 DIAGNOSIS — F41.9 ANXIETY: ICD-10-CM

## 2021-06-01 DIAGNOSIS — I10 HYPERTENSION, UNSPECIFIED TYPE: Primary | ICD-10-CM

## 2021-06-01 PROCEDURE — 3725F SCREEN DEPRESSION PERFORMED: CPT | Performed by: FAMILY MEDICINE

## 2021-06-01 PROCEDURE — 1160F RVW MEDS BY RX/DR IN RCRD: CPT | Performed by: FAMILY MEDICINE

## 2021-06-01 PROCEDURE — 3288F FALL RISK ASSESSMENT DOCD: CPT | Performed by: FAMILY MEDICINE

## 2021-06-01 PROCEDURE — 1170F FXNL STATUS ASSESSED: CPT | Performed by: FAMILY MEDICINE

## 2021-06-01 PROCEDURE — 1125F AMNT PAIN NOTED PAIN PRSNT: CPT | Performed by: FAMILY MEDICINE

## 2021-06-01 PROCEDURE — G0439 PPPS, SUBSEQ VISIT: HCPCS | Performed by: FAMILY MEDICINE

## 2021-06-01 PROCEDURE — 99214 OFFICE O/P EST MOD 30 MIN: CPT | Performed by: FAMILY MEDICINE

## 2021-06-01 PROCEDURE — 3008F BODY MASS INDEX DOCD: CPT | Performed by: FAMILY MEDICINE

## 2021-06-01 RX ORDER — CITALOPRAM 10 MG/1
10 TABLET ORAL DAILY
Qty: 90 TABLET | Refills: 3 | Status: SHIPPED | OUTPATIENT
Start: 2021-06-01 | End: 2021-09-24 | Stop reason: SDUPTHER

## 2021-06-01 RX ORDER — ALPRAZOLAM 0.5 MG/1
0.5 TABLET ORAL 2 TIMES DAILY PRN
Qty: 180 TABLET | Refills: 1 | Status: SHIPPED | OUTPATIENT
Start: 2021-06-01 | End: 2021-07-14

## 2021-06-01 RX ORDER — LEVOTHYROXINE SODIUM 0.07 MG/1
75 TABLET ORAL DAILY
Qty: 90 TABLET | Refills: 3 | Status: SHIPPED | OUTPATIENT
Start: 2021-06-01 | End: 2022-02-22 | Stop reason: SDUPTHER

## 2021-06-01 RX ORDER — AMLODIPINE BESYLATE 5 MG/1
5 TABLET ORAL DAILY
Qty: 90 TABLET | Refills: 3 | Status: SHIPPED | OUTPATIENT
Start: 2021-06-01 | End: 2022-03-10

## 2021-06-01 NOTE — ASSESSMENT & PLAN NOTE
Depression with anxiety  Patient feels symptoms are fairly well controlled on current regimen of citalopram and Xanax  She was given a refill on medications as requested

## 2021-06-01 NOTE — PROGRESS NOTES
FAMILY PRACTICE OFFICE VISIT       NAME: Mickey Maradiaga  AGE: 78 y o  SEX: female       : 1945        MRN: [id number]    DATE: 2021  TIME: 10:37 AM    Assessment and Plan     Problem List Items Addressed This Visit        Endocrine    Hypothyroidism      Hypothyroidism  Patient was given prescription to obtain TSH blood work and continue with current dose of levothyroxine  We will make further recommendations pending results of test          Relevant Medications    levothyroxine 75 mcg tablet       Cardiovascular and Mediastinum    Hypertension - Primary      Hypertension  Patient blood pressure is stable at this time she will continue current regimen of medications  She will obtain blood work as ordered for further evaluation         Relevant Medications    amLODIPine (NORVASC) 5 mg tablet    Other Relevant Orders    CBC    Comprehensive metabolic panel    Lipid panel    TSH, 3rd generation       Other    Anxiety    Relevant Medications    citalopram (CeleXA) 10 mg tablet    ALPRAZolam (XANAX) 0 5 mg tablet    Depression, recurrent ([address] )       Depression with anxiety  Patient feels symptoms are fairly well controlled on current regimen of citalopram and Xanax  She was given a refill on medications as requested  Relevant Medications    citalopram (CeleXA) 10 mg tablet    ALPRAZolam (XANAX) 0 5 mg tablet              Chief Complaint     Chief Complaint   Patient presents with    Medicare Wellness Visit     bmi phq        History of Present Illness      Patient in the office to review chronic medical condition  She denies any recent illness  She received her initial COVID vaccination  She does not obtain a regular form of exercise but would like to start going back to the gym when she is fully vaccinated  She has seen:  Rectal doctor in the last year for recurring hemorrhoids  She has noticed decreased appetite 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    She continues to smoke on a regular basis      Review of Systems   Review of Systems   Constitutional: Negative  HENT: Negative  Eyes: Negative  Respiratory: Negative  Cardiovascular: Negative  Gastrointestinal: Negative  Genitourinary: Negative  Musculoskeletal: Negative  Skin: Negative  Neurological: Negative  Psychiatric/Behavioral: Negative          Active Problem List     Patient Active Problem List   Diagnosis    Hypertension    Anxiety    Hypothyroidism    Depression, recurrent ([address] )       Past Medical History:  Past Medical History:   Diagnosis Date    Anxiety     Last Assessed: 6/12/2017     Depression     FH: colonic polyps     Glaucoma     Hypertension     Last Assessed: 12/12/2017    Hypothyroidism     Last Assessed: 6/12/2017    Menopause     Normal vaginal delivery     Osteopenia     Pap smear abnormality of cervix with ASCUS favoring benign     Telangiectasis        Past Surgical History:  Past Surgical History:   Procedure Laterality Date    CATARACT EXTRACTION      COLONOSCOPY      complete    DILATION AND CURETTAGE OF UTERUS  01/10/1978    LAPAROSCOPY      Diagnostic     TUBAL LIGATION         Family History:  Family History   Problem Relation Age of Onset    Heart disease Mother         Cardiac Disorder     Colonic polyp Mother     Hypertension Mother     Heart disease Father         Cardiac Dsiorder     Hypertension Father     Lung cancer Father     Breast cancer Sister 76    Stomach cancer Sister     Colonic polyp Brother     Lung cancer Brother     Hepatitis Daughter         Alcoholic    Arthritis Family     Lung cancer Family     No Known Problems Maternal Grandmother     No Known Problems Maternal Grandfather     No Known Problems Paternal Grandmother     No Known Problems Paternal Grandfather     No Known Problems Son     Esophageal cancer Sister 64       Social History:  Social History     Socioeconomic History    Marital status: /Civil Union     Spouse name: Not on file    Number of children: Not on file    Years of education: Not on file    Highest education level: Not on file   Occupational History    Not on file   Social Needs    Financial resource strain: Not on file    Food insecurity     Worry: Not on file     Inability: Not on file    Transportation needs     Medical: Not on file     Non-medical: Not on file   Tobacco Use    Smoking status: Current Some Day Smoker    Smokeless tobacco: Never Used    Tobacco comment: smokes and motivated to quit  Light cigarette smoke (1-9 cigarettes per day)    Substance and Sexual Activity    Alcohol use: Yes     Comment: Being A Social Drinker - Drinks wine, social alcohol use     Drug use: No    Sexual activity: Yes   Lifestyle    Physical activity     Days per week: Not on file     Minutes per session: Not on file    Stress: Not on file   Relationships    Social connections     Talks on phone: Not on file     Gets together: Not on file     Attends Je[employer]'s witness service: Not on file     Active member of club or organization: Not on file     Attends meetings of clubs or organizations: Not on file     Relationship status: Not on file    Intimate partner violence     Fear of current or ex partner: Not on file     Emotionally abused: Not on file     Physically abused: Not on file     Forced sexual activity: Not on file   Other Topics Concern    Not on file   Social History Narrative    Drinks Coffee - 1-2 cup a day     Exercises Daily        Objective     Vitals:    06/01/21 1000   BP: 104/60   Pulse: 103   Resp: 15   Temp: 98 °F (36 7 °C)   SpO2: 94%     Wt Readings from Last 3 Encounters:   06/01/21 47 2 kg (104 lb)   05/29/20 48 2 kg (106 lb 4 oz)   10/29/19 49 4 kg (109 lb)       Physical Exam  Constitutional:       General: She is not in acute distress  Appearance: Normal appearance  She is not ill-appearing  HENT:      Head: Normocephalic and atraumatic        Right Ear: Tympanic membrane, ear canal and external ear normal  There is no impacted cerumen  Left Ear: Tympanic membrane, ear canal and external ear normal  There is no impacted cerumen  Eyes:      General:         Right eye: No discharge  Left eye: No discharge  Extraocular Movements: Extraocular movements intact  Conjunctiva/sclera: Conjunctivae normal       Pupils: Pupils are equal, round, and reactive to light  Neck:      Vascular: No carotid bruit  Cardiovascular:      Rate and Rhythm: Normal rate and regular rhythm  Heart sounds: Normal heart sounds  No murmur  Pulmonary:      Effort: Pulmonary effort is normal       Breath sounds: Normal breath sounds  No wheezing, rhonchi or rales  Abdominal:      General: Abdomen is flat  Bowel sounds are normal  There is no distension  Palpations: Abdomen is soft  Tenderness: There is no abdominal tenderness  There is no guarding or rebound  Musculoskeletal:      Right lower leg: No edema  Left lower leg: No edema  Lymphadenopathy:      Cervical: No cervical adenopathy  Skin:     Findings: No rash  Neurological:      General: No focal deficit present  Mental Status: She is alert and oriented to person, place, and time  Cranial Nerves: No cranial nerve deficit  Psychiatric:         Mood and Affect: Mood normal          Behavior: Behavior normal          Thought Content:  Thought content normal          Judgment: Judgment normal          Pertinent Laboratory/Diagnostic Studies:  Lab Results   Component Value Date    BUN 5 (L) 08/03/2020    CREATININE 0 54 (L) 08/03/2020    CALCIUM 9 5 08/03/2020     06/26/2017    K 4 0 08/03/2020    CO2 30 08/03/2020     08/03/2020     Lab Results   Component Value Date    ALT 16 08/03/2020    AST 28 08/03/2020    ALKPHOS 89 08/03/2020    BILITOT 0 7 06/26/2017       Lab Results   Component Value Date    WBC 5 9 08/03/2020    HGB 14 8 08/03/2020    HCT 43 9 08/03/2020    MCV 95 9 08/03/2020     08/03/2020       Lab Results   Component Value Date    TSH 1 04 08/03/2020       Lab Results   Component Value Date    CHOL 220 (H) 06/26/2017     Lab Results   Component Value Date    TRIG 117 08/03/2020     Lab Results   Component Value Date    HDL 64 08/03/2020     Lab Results   Component Value Date    LDLCALC 100 (H) 08/03/2020     No results found for: HGBA1C    Results for orders placed or performed in visit on 08/03/20   Lipid panel   Result Value Ref Range    Total Cholesterol 185 <200 mg/dL    HDL 64 > OR = 50 mg/dL    Triglycerides 117 <150 mg/dL    LDL Calculated 100 (H) mg/dL (calc)    Chol HDLC Ratio 2 9 <5 0 (calc)    Non-HDL Cholesterol 121 <130 mg/dL (calc)   Comprehensive metabolic panel   Result Value Ref Range    Glucose, Random 86 65 - 99 mg/dL    BUN 5 (L) 7 - 25 mg/dL    Creatinine 0 54 (L) 0 60 - 0 93 mg/dL    eGFR Non  94 > OR = 60 mL/min/1 73m2    eGFR  108 > OR = 60 mL/min/1 73m2    SL AMB BUN/CREATININE RATIO 9 6 - 22 (calc)    Sodium 139 135 - 146 mmol/L    Potassium 4 0 3 5 - 5 3 mmol/L    Chloride 100 98 - 110 mmol/L    CO2 30 20 - 32 mmol/L    Calcium 9 5 8 6 - 10 4 mg/dL    Protein, Total 7 3 6 1 - 8 1 g/dL    Albumin 4 4 3 6 - 5 1 g/dL    Globulin 2 9 1 9 - 3 7 g/dL (calc)    Albumin/Globulin Ratio 1 5 1 0 - 2 5 (calc)    TOTAL BILIRUBIN 0 7 0 2 - 1 2 mg/dL    Alkaline Phosphatase 89 37 - 153 U/L    AST 28 10 - 35 U/L    ALT 16 6 - 29 U/L   CBC and differential   Result Value Ref Range    White Blood Cell Count 5 9 3 8 - 10 8 Thousand/uL    Red Blood Cell Count 4 58 3 80 - 5 10 Million/uL    Hemoglobin 14 8 11 7 - 15 5 g/dL    HCT 43 9 35 0 - 45 0 %    MCV 95 9 80 0 - 100 0 fL    MCH 32 3 27 0 - 33 0 pg    MCHC 33 7 32 0 - 36 0 g/dL    RDW 13 2 11 0 - 15 0 %    Platelet Count 453 241 - 400 Thousand/uL    SL AMB MPV 10 3 7 5 - 12 5 fL    Neutrophils (Absolute) 2,785 1,500 - 7,800 cells/uL    Lymphocytes (Absolute) 2,342 850 - 3,900 cells/uL    Monocytes (Absolute) 578 200 - 950 cells/uL    Eosinophils (Absolute) 148 15 - 500 cells/uL    Basophils ABS 47 0 - 200 cells/uL    Neutrophils 47 2 %    Lymphocytes 39 7 %    Monocytes 9 8 %    Eosinophils 2 5 %    Basophils PCT 0 8 %   TSH, 3rd generation   Result Value Ref Range    TSH 1 04 0 40 - 4 50 mIU/L       Orders Placed This Encounter   Procedures    CBC    Comprehensive metabolic panel    Lipid panel    TSH, 3rd generation       ALLERGIES:  No Known Allergies    Current Medications     Current Outpatient Medications   Medication Sig Dispense Refill    ALPRAZolam (XANAX) 0 5 mg tablet Take 1 tablet (0 5 mg total) by mouth 2 (two) times a day as needed (as needed) 180 tablet 1    amLODIPine (NORVASC) 5 mg tablet Take 1 tablet (5 mg total) by mouth daily 90 tablet 3    bimatoprost (LUMIGAN) 0 01 % ophthalmic drops Apply 1 drop to eye Daily      brimonidine (ALPHAGAN P) 0 1 % Apply 1 drop to eye every 12 (twelve) hours      citalopram (CeleXA) 10 mg tablet Take 1 tablet (10 mg total) by mouth daily 90 tablet 3    levothyroxine 75 mcg tablet Take 1 tablet (75 mcg total) by mouth daily 90 tablet 3     No current facility-administered medications for this visit            Health Maintenance     Health Maintenance   Topic Date Due    Hepatitis C Screening  Never done    SLP PLAN OF CARE  Never done    Depression Follow-up Plan  Never done    DTaP,Tdap,and Td Vaccines (1 - Tdap) Never done    Fall Risk  05/29/2021   [employer] Medicare Annual Wellness Visit (AWV)  05/29/2021    COVID-19 Vaccine (2 - Pfizer 2-dose series) 06/07/2021    MAMMOGRAM  10/29/2021    Depression Screening PHQ  06/01/2022    BMI: Adult  06/01/2022    Colorectal Cancer Screening  06/06/2026    Pneumococcal Vaccine: 65+ Years  Completed    HIB Vaccine  Aged Out    Hepatitis B Vaccine  Aged Out    IPV Vaccine  Aged Out    Hepatitis A Vaccine  Aged Out    Meningococcal ACWY Vaccine  Aged Out    HPV Vaccine Aged Out    Influenza Vaccine  Discontinued     Immunization History   Administered Date(s) Administered    Influenza, seasonal, injectable 09/22/2016    Pneumococcal Conjugate 13-Valent 05/17/2019    Pneumococcal Polysaccharide PPV23 06/12/2017    SARS-CoV-2 / COVID-19 mRNA IM (Pfizer-BioNTech) 05/17/2021    Zoster 01/04/2016    Zoster Vaccine Recombinant 08/39/4379       Marthe Krabbe, MD      I spent 25 minutes with this patient which greater than 50% was spent counseling

## 2021-06-01 NOTE — ASSESSMENT & PLAN NOTE
Hypothyroidism  Patient was given prescription to obtain TSH blood work and continue with current dose of levothyroxine    We will make further recommendations pending results of test

## 2021-06-01 NOTE — PATIENT INSTRUCTIONS
Medicare Preventive Visit Patient Instructions  Thank you for completing your Welcome to Medicare Visit or Medicare Annual Wellness Visit today  Your next wellness visit will be due in one year (6/2/2022)  The screening/preventive services that you may require over the next 5-10 years are detailed below  Some tests may not apply to you based off risk factors and/or age  Screening tests ordered at today's visit but not completed yet may show as past due  Also, please note that scanned in results may not display below  Preventive Screenings:  Service Recommendations Previous Testing/Comments   Colorectal Cancer Screening  * Colonoscopy    * Fecal Occult Blood Test (FOBT)/Fecal Immunochemical Test (FIT)  * Fecal DNA/Cologuard Test  * Flexible Sigmoidoscopy Age: 54-65 years old   Colonoscopy: every 10 years (may be performed more frequently if at higher risk)  OR  FOBT/FIT: every 1 year  OR  Cologuard: every 3 years  OR  Sigmoidoscopy: every 5 years  Screening may be recommended earlier than age 48 if at higher risk for colorectal cancer  Also, an individualized decision between you and your healthcare provider will decide whether screening between the ages of 74-80 would be appropriate  Colonoscopy: 06/06/2016  FOBT/FIT: Not on file  Cologuard: Not on file  Sigmoidoscopy: Not on file    Screening Current     Breast Cancer Screening Age: 36 years old  Frequency: every 1-2 years  Not required if history of left and right mastectomy Mammogram: 10/29/2019    Screening Current   Cervical Cancer Screening Between the ages of 21-29, pap smear recommended once every 3 years  Between the ages of 33-67, can perform pap smear with HPV co-testing every 5 years     Recommendations may differ for women with a history of total hysterectomy, cervical cancer, or abnormal pap smears in past  Pap Smear: Not on file    Screening Not Indicated   Hepatitis C Screening Once for adults born between 1945 and 1965  More frequently in patients at high risk for Hepatitis C Hep C Antibody: Not on file        Diabetes Screening 1-2 times per year if you're at risk for diabetes or have pre-diabetes Fasting glucose: No results in last 5 years   A1C: No results in last 5 years    Screening Current   Cholesterol Screening Once every 5 years if you don't have a lipid disorder  May order more often based on risk factors  Lipid panel: 08/03/2020    Screening Current     Other Preventive Screenings Covered by Medicare:  1  Abdominal Aortic Aneurysm (AAA) Screening: covered once if your at risk  You're considered to be at risk if you have a family history of AAA  2  Lung Cancer Screening: covers low dose CT scan once per year if you meet all of the following conditions: (1) Age 50-69; (2) No signs or symptoms of lung cancer; (3) Current smoker or have quit smoking within the last 15 years; (4) You have a tobacco smoking history of at least 30 pack years (packs per day multiplied by number of years you smoked); (5) You get a written order from a healthcare provider  3  Glaucoma Screening: covered annually if you're considered high risk: (1) You have diabetes OR (2) Family history of glaucoma OR (3)  aged 48 and older OR (3)  American aged 72 and older  3  Osteoporosis Screening: covered every 2 years if you meet one of the following conditions: (1) You're estrogen deficient and at risk for osteoporosis based off medical history and other findings; (2) Have a vertebral abnormality; (3) On glucocorticoid therapy for more than 3 months; (4) Have primary hyperparathyroidism; (5) On osteoporosis medications and need to assess response to drug therapy  · Last bone density test (DXA Scan): 04/25/2016   5  HIV Screening: covered annually if you're between the age of 15-65  Also covered annually if you are younger than 13 and older than 72 with risk factors for HIV infection   For pregnant patients, it is covered up to 3 times per pregnancy  Immunizations:  Immunization Recommendations   Influenza Vaccine Annual influenza vaccination during flu season is recommended for all persons aged >= 6 months who do not have contraindications   Pneumococcal Vaccine (Prevnar and Pneumovax)  * Prevnar = PCV13  * Pneumovax = PPSV23   Adults 25-60 years old: 1-3 doses may be recommended based on certain risk factors  Adults 72 years old: Prevnar (PCV13) vaccine recommended followed by Pneumovax (PPSV23) vaccine  If already received PPSV23 since turning 65, then PCV13 recommended at least one year after PPSV23 dose  Hepatitis B Vaccine 3 dose series if at intermediate or high risk (ex: diabetes, end stage renal disease, liver disease)   Tetanus (Td) Vaccine - COST NOT COVERED BY MEDICARE PART B Following completion of primary series, a booster dose should be given every 10 years to maintain immunity against tetanus  Td may also be given as tetanus wound prophylaxis  Tdap Vaccine - COST NOT COVERED BY MEDICARE PART B Recommended at least once for all adults  For pregnant patients, recommended with each pregnancy  Shingles Vaccine (Shingrix) - COST NOT COVERED BY MEDICARE PART B  2 shot series recommended in those aged 48 and above     Health Maintenance Due:      Topic Date Due    Hepatitis C Screening  Never done    MAMMOGRAM  10/29/2021    Colorectal Cancer Screening  06/06/2026     Immunizations Due:      Topic Date Due    DTaP,Tdap,and Td Vaccines (1 - Tdap) Never done     Advance Directives   What are advance directives? Advance directives are legal documents that state your wishes and plans for medical care  These plans are made ahead of time in case you lose your ability to make decisions for yourself  Advance directives can apply to any medical decision, such as the treatments you want, and if you want to donate organs  What are the types of advance directives?   There are many types of advance directives, and each state has rules about how to use them  You may choose a combination of any of the following:  · Living will: This is a written record of the treatment you want  You can also choose which treatments you do not want, which to limit, and which to stop at a certain time  This includes surgery, medicine, IV fluid, and tube feedings  · Durable power of  for healthcare [place] [facility]): This is a written record that states who you want to make healthcare choices for you when you are unable to make them for yourself  This person, called a proxy, is usually a family member or a friend  You may choose more than 1 proxy  · Do not resuscitate (DNR) order:  A DNR order is used in case your heart stops beating or you stop breathing  It is a request not to have certain forms of treatment, such as CPR  A DNR order may be included in other types of advance directives  · Medical directive: This covers the care that you want if you are in a coma, near death, or unable to make decisions for yourself  You can list the treatments you want for each condition  Treatment may include pain medicine, surgery, blood transfusions, dialysis, IV or tube feedings, and a ventilator (breathing machine)  · Values history: This document has questions about your views, beliefs, and how you feel and think about life  This information can help others choose the care that you would choose  Why are advance directives important? An advance directive helps you control your care  Although spoken wishes may be used, it is better to have your wishes written down  Spoken wishes can be misunderstood, or not followed  Treatments may be given even if you do not want them  An advance directive may make it easier for your family to make difficult choices about your care  Depression   Depression  is a medical condition that causes feelings of sadness or hopelessness that do not go away  Depression may cause you to lose interest in things you used to enjoy   These feelings may interfere with your daily life  Call your local emergency number (911 in the [address]) if:   · You think about harming yourself or someone else  · You have done something on purpose to hurt yourself  The following resources are available at any time to help you, if needed:   · [address]: 2-793.979.3406 (5-407-696-PZQC)   · Suicide Hotline: 7-368.572.1807 (4-433-OFYBXUA)   · For a list of international numbers: https://save org/find-help/international-resources/  Treatment for depression may include medicine to relieve depression  Medicine is often used together with therapy  Therapy is a way for you to talk about your feelings and anything that may be causing depression  Therapy can be done alone or in a group  It may also be done with family members or a significant other  · Get regular physical activity  · Create a regular sleep schedule  · Eat a variety of healthy foods  · Do not drink alcohol or use drugs  Cigarette Smoking and Your Health   Risks to your health if you smoke:  Nicotine and other chemicals found in tobacco damage every cell in your body  Even if you are a light smoker, you have an increased risk for cancer, heart disease, and lung disease  If you are pregnant or have diabetes, smoking increases your risk for complications  Benefits to your health if you stop smoking:   · You decrease respiratory symptoms such as coughing, wheezing, and shortness of breath  · You reduce your risk for cancers of the lung, mouth, throat, kidney, bladder, pancreas, stomach, and cervix  If you already have cancer, you increase the benefits of chemotherapy  You also reduce your risk for cancer returning or a second cancer from developing  · You reduce your risk for heart disease, blood clots, heart attack, and stroke  · You reduce your risk for lung infections, and diseases such as pneumonia, asthma, chronic bronchitis, and emphysema  · Your circulation improves   More oxygen can be delivered to your body  If you have diabetes, you lower your risk for complications, such as kidney, artery, and eye diseases  You also lower your risk for nerve damage  Nerve damage can lead to amputations, poor vision, and blindness  · You improve your body's ability to heal and to fight infections  For more information and support to stop smoking:   · [employer]  Phone: 7- 120 - 388-0665  Web Address: [employer]  [address] [place] [address] 2018 Information is for End User's use only and may not be sold, redistributed or otherwise used for commercial purposes   All illustrations and images included in CareNotes® are the copyrighted property of [employer]  or [address] [place] [address]

## 2021-06-01 NOTE — ASSESSMENT & PLAN NOTE
Hypertension  Patient blood pressure is stable at this time she will continue current regimen of medications    She will obtain blood work as ordered for further evaluation

## 2021-06-15 ENCOUNTER — IMMUNIZATIONS (OUTPATIENT)
Dept: FAMILY MEDICINE CLINIC | Facility: HOSPITAL | Age: 76
End: 2021-06-15

## 2021-06-15 DIAGNOSIS — Z23 ENCOUNTER FOR IMMUNIZATION: Primary | ICD-10-CM

## 2021-06-15 PROCEDURE — 91300 SARS-COV-2 / COVID-19 MRNA VACCINE (PFIZER-BIONTECH) 30 MCG: CPT

## 2021-06-15 PROCEDURE — 0002A SARS-COV-2 / COVID-19 MRNA VACCINE (PFIZER-BIONTECH) 30 MCG: CPT

## 2021-07-14 DIAGNOSIS — E03.9 HYPOTHYROIDISM, UNSPECIFIED TYPE: ICD-10-CM

## 2021-07-14 RX ORDER — LEVOTHYROXINE SODIUM 0.07 MG/1
75 TABLET ORAL DAILY
Qty: 90 TABLET | Refills: 3 | Status: CANCELLED | OUTPATIENT
Start: 2021-07-14

## 2021-09-24 DIAGNOSIS — F41.9 ANXIETY: ICD-10-CM

## 2021-09-24 RX ORDER — CITALOPRAM 10 MG/1
10 TABLET ORAL DAILY
Qty: 90 TABLET | Refills: 3 | Status: SHIPPED | OUTPATIENT
Start: 2021-09-24 | End: 2022-07-13

## 2021-09-24 RX ORDER — ALPRAZOLAM 0.5 MG/1
0.5 TABLET ORAL 2 TIMES DAILY PRN
Qty: 180 TABLET | Refills: 1 | Status: SHIPPED | OUTPATIENT
Start: 2021-09-24 | End: 2021-12-20

## 2021-12-14 ENCOUNTER — FOLLOW UP (OUTPATIENT)
Dept: URBAN - METROPOLITAN AREA CLINIC 6 | Facility: CLINIC | Age: 76
End: 2021-12-14

## 2021-12-14 DIAGNOSIS — H40.1131: ICD-10-CM

## 2021-12-14 DIAGNOSIS — H04.123: ICD-10-CM

## 2021-12-14 DIAGNOSIS — Z96.1: ICD-10-CM

## 2021-12-14 PROCEDURE — G8428 CUR MEDS NOT DOCUMENT: HCPCS

## 2021-12-14 PROCEDURE — 92083 EXTENDED VISUAL FIELD XM: CPT

## 2021-12-14 PROCEDURE — 1036F TOBACCO NON-USER: CPT

## 2021-12-14 PROCEDURE — 92012 INTRM OPH EXAM EST PATIENT: CPT

## 2021-12-14 ASSESSMENT — TONOMETRY
OD_IOP_MMHG: 21
OS_IOP_MMHG: 19

## 2021-12-14 ASSESSMENT — VISUAL ACUITY
OS_SC: 20/30-2
OD_PH: 20/70-1
OU_SC: J1
OD_SC: 20/80

## 2022-01-04 ENCOUNTER — VBI (OUTPATIENT)
Dept: ADMINISTRATIVE | Facility: OTHER | Age: 77
End: 2022-01-04

## 2022-02-12 ENCOUNTER — IMMUNIZATIONS (OUTPATIENT)
Dept: FAMILY MEDICINE CLINIC | Facility: HOSPITAL | Age: 77
End: 2022-02-12

## 2022-02-12 PROCEDURE — 91305 COVID-19 PFIZER VACC TRIS-SUCROSE GRAY CAP 0.3 ML: CPT

## 2022-02-12 PROCEDURE — 0051A COVID-19 PFIZER VACC TRIS-SUCROSE GRAY CAP 0.3 ML: CPT

## 2022-02-17 ENCOUNTER — TELEPHONE (OUTPATIENT)
Dept: FAMILY MEDICINE CLINIC | Facility: CLINIC | Age: 77
End: 2022-02-17

## 2022-02-17 LAB
ALBUMIN SERPL-MCNC: 4.2 G/DL (ref 3.6–5.1)
ALBUMIN/GLOB SERPL: 1.4 (CALC) (ref 1–2.5)
ALP SERPL-CCNC: 87 U/L (ref 37–153)
ALT SERPL-CCNC: 20 U/L (ref 6–29)
AST SERPL-CCNC: 41 U/L (ref 10–35)
BASOPHILS # BLD AUTO: 51 CELLS/UL (ref 0–200)
BASOPHILS NFR BLD AUTO: 1 %
BILIRUB SERPL-MCNC: 0.5 MG/DL (ref 0.2–1.2)
BUN SERPL-MCNC: 5 MG/DL (ref 7–25)
BUN/CREAT SERPL: 11 (CALC) (ref 6–22)
CALCIUM SERPL-MCNC: 9.4 MG/DL (ref 8.6–10.4)
CHLORIDE SERPL-SCNC: 101 MMOL/L (ref 98–110)
CHOLEST SERPL-MCNC: 177 MG/DL
CHOLEST/HDLC SERPL: 2.5 (CALC)
CO2 SERPL-SCNC: 32 MMOL/L (ref 20–32)
CREAT SERPL-MCNC: 0.47 MG/DL (ref 0.6–0.93)
EOSINOPHIL # BLD AUTO: 128 CELLS/UL (ref 15–500)
EOSINOPHIL NFR BLD AUTO: 2.5 %
ERYTHROCYTE [DISTWIDTH] IN BLOOD BY AUTOMATED COUNT: 13.6 % (ref 11–15)
GLOBULIN SER CALC-MCNC: 3.1 G/DL (CALC) (ref 1.9–3.7)
GLUCOSE SERPL-MCNC: 84 MG/DL (ref 65–99)
HCT VFR BLD AUTO: 50.4 % (ref 35–45)
HDLC SERPL-MCNC: 70 MG/DL
HGB BLD-MCNC: 17.5 G/DL (ref 11.7–15.5)
LDLC SERPL CALC-MCNC: 84 MG/DL (CALC)
LYMPHOCYTES # BLD AUTO: 1795 CELLS/UL (ref 850–3900)
LYMPHOCYTES NFR BLD AUTO: 35.2 %
MCH RBC QN AUTO: 33.5 PG (ref 27–33)
MCHC RBC AUTO-ENTMCNC: 34.7 G/DL (ref 32–36)
MCV RBC AUTO: 96.6 FL (ref 80–100)
MONOCYTES # BLD AUTO: 699 CELLS/UL (ref 200–950)
MONOCYTES NFR BLD AUTO: 13.7 %
NEUTROPHILS # BLD AUTO: 2428 CELLS/UL (ref 1500–7800)
NEUTROPHILS NFR BLD AUTO: 47.6 %
NONHDLC SERPL-MCNC: 107 MG/DL (CALC)
PLATELET # BLD AUTO: 264 THOUSAND/UL (ref 140–400)
PMV BLD REES-ECKER: 10.1 FL (ref 7.5–12.5)
POTASSIUM SERPL-SCNC: 4 MMOL/L (ref 3.5–5.3)
PROT SERPL-MCNC: 7.3 G/DL (ref 6.1–8.1)
RBC # BLD AUTO: 5.22 MILLION/UL (ref 3.8–5.1)
SL AMB EGFR AFRICAN AMERICAN: 112 ML/MIN/1.73M2
SL AMB EGFR NON AFRICAN AMERICAN: 97 ML/MIN/1.73M2
SODIUM SERPL-SCNC: 141 MMOL/L (ref 135–146)
TRIGL SERPL-MCNC: 131 MG/DL
TSH SERPL-ACNC: 0.14 MIU/L (ref 0.4–4.5)
WBC # BLD AUTO: 5.1 THOUSAND/UL (ref 3.8–10.8)

## 2022-02-17 NOTE — TELEPHONE ENCOUNTER
----- Message from Hemalatha Donnelly MD sent at 9/76/0476  6:39 AM EST -----  Patient should have follow-up office visit to review recent blood work results

## 2022-02-22 ENCOUNTER — OFFICE VISIT (OUTPATIENT)
Dept: FAMILY MEDICINE CLINIC | Facility: CLINIC | Age: 77
End: 2022-02-22
Payer: COMMERCIAL

## 2022-02-22 VITALS
DIASTOLIC BLOOD PRESSURE: 80 MMHG | SYSTOLIC BLOOD PRESSURE: 140 MMHG | WEIGHT: 99 LBS | OXYGEN SATURATION: 95 % | HEIGHT: 62 IN | TEMPERATURE: 98.2 F | HEART RATE: 110 BPM | RESPIRATION RATE: 20 BRPM | BODY MASS INDEX: 18.22 KG/M2

## 2022-02-22 DIAGNOSIS — I10 PRIMARY HYPERTENSION: ICD-10-CM

## 2022-02-22 DIAGNOSIS — R19.7 DIARRHEA, UNSPECIFIED TYPE: ICD-10-CM

## 2022-02-22 DIAGNOSIS — E03.9 HYPOTHYROIDISM, UNSPECIFIED TYPE: Primary | ICD-10-CM

## 2022-02-22 DIAGNOSIS — K64.9 HEMORRHOIDS, UNSPECIFIED HEMORRHOID TYPE: ICD-10-CM

## 2022-02-22 DIAGNOSIS — F41.9 ANXIETY: ICD-10-CM

## 2022-02-22 PROCEDURE — 1160F RVW MEDS BY RX/DR IN RCRD: CPT | Performed by: FAMILY MEDICINE

## 2022-02-22 PROCEDURE — 99214 OFFICE O/P EST MOD 30 MIN: CPT | Performed by: FAMILY MEDICINE

## 2022-02-22 PROCEDURE — 3008F BODY MASS INDEX DOCD: CPT | Performed by: FAMILY MEDICINE

## 2022-02-22 PROCEDURE — 3079F DIAST BP 80-89 MM HG: CPT | Performed by: FAMILY MEDICINE

## 2022-02-22 PROCEDURE — 3077F SYST BP >= 140 MM HG: CPT | Performed by: FAMILY MEDICINE

## 2022-02-22 RX ORDER — LEVOTHYROXINE SODIUM 0.05 MG/1
50 TABLET ORAL DAILY
Qty: 90 TABLET | Refills: 1 | Status: SHIPPED | OUTPATIENT
Start: 2022-02-22 | End: 2022-05-23

## 2022-02-22 RX ORDER — HYDROCORTISONE 25 MG/G
CREAM TOPICAL 2 TIMES DAILY
Qty: 28 G | Refills: 5 | Status: SHIPPED | OUTPATIENT
Start: 2022-02-22

## 2022-02-22 NOTE — ASSESSMENT & PLAN NOTE
Diarrhea  Patient was recommended to add over-the-counter FiberCon tablet once daily to see if this would help her bind her stool  Patient refused any type of colon cancer test including colonoscopy    Patient is aware risks involved with rectal bleeding and possibility of cancer

## 2022-02-22 NOTE — ASSESSMENT & PLAN NOTE
Hypothyroidism  Patient was given prescription to reduce her levothyroxine from 75 to 50 mcg tablet once daily

## 2022-02-22 NOTE — ASSESSMENT & PLAN NOTE
Hemorrhoids  Patient given prescription for Anusol cream to use b i d  as directed  She may also continue with her Sitz baths to help with hemorrhoidal tissue    Patient refused referral to colorectal specialist

## 2022-02-22 NOTE — PROGRESS NOTES
FAMILY PRACTICE OFFICE VISIT       NAME: Ping Maradiaga  AGE: 78 y o  SEX: female       : 1945        MRN: [id number]    DATE: 2022  TIME: 10:58 AM    Assessment and Plan     Problem List Items Addressed This Visit        Digestive    Hemorrhoids     Hemorrhoids  Patient given prescription for Anusol cream to use b i d  as directed  She may also continue with her Sitz baths to help with hemorrhoidal tissue  Patient refused referral to colorectal specialist         Relevant Medications    hydrocortisone (ANUSOL-HC) 2 5 % rectal cream       Endocrine    Hypothyroidism - Primary     Hypothyroidism  Patient was given prescription to reduce her levothyroxine from 75 to 50 mcg tablet once daily  Relevant Medications    levothyroxine 50 mcg tablet       Cardiovascular and Mediastinum    Hypertension     Hypertension  Patient blood pressure is stable at this time she will continue current regimen of medications            Other    Anxiety     Anxiety  Patient continues to use citalopram and Xanax as needed for her chronic intermittent anxiety         Diarrhea     Diarrhea  Patient was recommended to add over-the-counter FiberCon tablet once daily to see if this would help her bind her stool  Patient refused any type of colon cancer test including colonoscopy  Patient is aware risks involved with rectal bleeding and possibility of cancer                   Chief Complaint     Chief Complaint   Patient presents with    Follow-up     Pt is here for f/u BW results       History of Present Illness     Patient the office to review chronic medical condition  She denies any recent illness  She continues to smoke 1 pack of cigarettes per day  Her COVID vaccination is up-to-date  She does not have a regular form of exercise  I reviewed her most recent blood test results  Patient has noticed decreased weight recently but also decreased appetite    She states she has had a long history of frequent bowel movements in the morning which she feels related to anxiety  Due to frequent bowel movements she does have chronic exacerbation of her hemorrhoids  Her last colonoscopy was 2016  Review of Systems   Review of Systems   Constitutional: Negative  HENT: Negative  Eyes: Negative  Respiratory: Negative  Cardiovascular: Negative  Gastrointestinal: Positive for diarrhea and rectal pain  Genitourinary: Negative  Musculoskeletal: Negative  Skin: Negative  Allergic/Immunologic: Negative  Neurological: Negative  Psychiatric/Behavioral: The patient is nervous/anxious          Active Problem List     Patient Active Problem List   Diagnosis    Hypertension    Anxiety    Hypothyroidism    Depression, recurrent ([address] )    Diarrhea    Hemorrhoids       Past Medical History:  Past Medical History:   Diagnosis Date    Anxiety     Last Assessed: 6/12/2017     Depression     FH: colonic polyps     Glaucoma     Hypertension     Last Assessed: 12/12/2017    Hypothyroidism     Last Assessed: 6/12/2017    Menopause     Normal vaginal delivery     Osteopenia     Pap smear abnormality of cervix with ASCUS favoring benign     Telangiectasis        Past Surgical History:  Past Surgical History:   Procedure Laterality Date    CATARACT EXTRACTION      COLONOSCOPY      complete    DILATION AND CURETTAGE OF UTERUS  01/10/1978    LAPAROSCOPY      Diagnostic     TUBAL LIGATION         Family History:  Family History   Problem Relation Age of Onset    Heart disease Mother         Cardiac Disorder     Colonic polyp Mother     Hypertension Mother     Heart disease Father         Cardiac Dsiorder     Hypertension Father     Lung cancer Father     Breast cancer Sister 76    Stomach cancer Sister     Colonic polyp Brother     Lung cancer Brother     Hepatitis Daughter         Alcoholic    Arthritis Family     Lung cancer Family     No Known Problems Maternal Grandmother     No Known Problems Maternal Grandfather     No Known Problems Paternal Grandmother     No Known Problems Paternal Grandfather     No Known Problems Son     Esophageal cancer Sister 64       Social History:  Social History     Socioeconomic History    Marital status: /Civil Union     Spouse name: Not on file    Number of children: Not on file    Years of education: Not on file    Highest education level: Not on file   Occupational History    Not on file   Tobacco Use    Smoking status: Current Some Day Smoker    Smokeless tobacco: Never Used    Tobacco comment: smokes and motivated to quit  Light cigarette smoke (1-9 cigarettes per day)    Vaping Use    Vaping Use: Never used   Substance and Sexual Activity    Alcohol use: Yes     Comment: Being A Social Drinker - Drinks wine, social alcohol use     Drug use: No    Sexual activity: Yes   Other Topics Concern    Not on file   Social History Narrative    Drinks Coffee - 1-2 cup a day     Exercises Daily      Social Determinants of Health     Financial Resource Strain: Not on file   Food Insecurity: Not on file   Transportation Needs: Not on file   Physical Activity: Not on file   Stress: Not on file   Social Connections: Not on file   Intimate Partner Violence: Not on file   Housing Stability: Not on file       Objective     Vitals:    02/22/22 1002   BP: 140/80   Pulse: (!) 110   Resp: 20   Temp: 98 2 °F (36 8 °C)   SpO2: 95%     Wt Readings from Last 3 Encounters:   02/22/22 44 9 kg (99 lb)   06/01/21 47 2 kg (104 lb)   05/29/20 48 2 kg (106 lb 4 oz)       Physical Exam  Constitutional:       General: She is not in acute distress  Appearance: Normal appearance  She is not ill-appearing  HENT:      Head: Normocephalic and atraumatic  Eyes:      General:         Right eye: No discharge  Left eye: No discharge  Extraocular Movements: Extraocular movements intact        Conjunctiva/sclera: Conjunctivae normal       Pupils: Pupils are equal, round, and reactive to light  Neck:      Vascular: No carotid bruit  Cardiovascular:      Rate and Rhythm: Normal rate and regular rhythm  Heart sounds: Normal heart sounds  No murmur heard  Pulmonary:      Effort: Pulmonary effort is normal       Breath sounds: Normal breath sounds  No wheezing, rhonchi or rales  Abdominal:      General: Abdomen is flat  Bowel sounds are normal  There is no distension  Palpations: Abdomen is soft  Tenderness: There is no abdominal tenderness  There is no guarding or rebound  Musculoskeletal:      Right lower leg: No edema  Left lower leg: No edema  Lymphadenopathy:      Cervical: No cervical adenopathy  Skin:     Findings: No rash  Neurological:      General: No focal deficit present  Mental Status: She is alert and oriented to person, place, and time  Cranial Nerves: No cranial nerve deficit  Psychiatric:         Mood and Affect: Mood normal          Behavior: Behavior normal          Thought Content:  Thought content normal          Judgment: Judgment normal          Pertinent Laboratory/Diagnostic Studies:  Lab Results   Component Value Date    BUN 5 (L) 02/16/2022    CREATININE 0 47 (L) 02/16/2022    CALCIUM 9 4 02/16/2022     06/26/2017    K 4 0 02/16/2022    CO2 32 02/16/2022     02/16/2022     Lab Results   Component Value Date    ALT 20 02/16/2022    AST 41 (H) 02/16/2022    ALKPHOS 87 02/16/2022    BILITOT 0 7 06/26/2017       Lab Results   Component Value Date    WBC 5 1 02/16/2022    HGB 17 5 (H) 02/16/2022    HCT 50 4 (H) 02/16/2022    MCV 96 6 02/16/2022     02/16/2022       Lab Results   Component Value Date    TSH 0 14 (L) 02/16/2022       Lab Results   Component Value Date    CHOL 220 (H) 06/26/2017     Lab Results   Component Value Date    TRIG 131 02/16/2022     Lab Results   Component Value Date    HDL 70 02/16/2022     Lab Results   Component Value Date    LDLCALC 84 02/16/2022 No results found for: HGBA1C    Results for orders placed or performed in visit on 02/16/22   Lipid panel   Result Value Ref Range    Total Cholesterol 177 <200 mg/dL    HDL 70 > OR = 50 mg/dL    Triglycerides 131 <150 mg/dL    LDL Calculated 84 mg/dL (calc)    Chol HDLC Ratio 2 5 <5 0 (calc)    Non-HDL Cholesterol 107 <130 mg/dL (calc)   Comprehensive metabolic panel   Result Value Ref Range    Glucose, Random 84 65 - 99 mg/dL    BUN 5 (L) 7 - 25 mg/dL    Creatinine 0 47 (L) 0 60 - 0 93 mg/dL    eGFR Non  97 > OR = 60 mL/min/1 73m2    eGFR  112 > OR = 60 mL/min/1 73m2    SL AMB BUN/CREATININE RATIO 11 6 - 22 (calc)    Sodium 141 135 - 146 mmol/L    Potassium 4 0 3 5 - 5 3 mmol/L    Chloride 101 98 - 110 mmol/L    CO2 32 20 - 32 mmol/L    Calcium 9 4 8 6 - 10 4 mg/dL    Protein, Total 7 3 6 1 - 8 1 g/dL    Albumin 4 2 3 6 - 5 1 g/dL    Globulin 3 1 1 9 - 3 7 g/dL (calc)    Albumin/Globulin Ratio 1 4 1 0 - 2 5 (calc)    TOTAL BILIRUBIN 0 5 0 2 - 1 2 mg/dL    Alkaline Phosphatase 87 37 - 153 U/L    AST 41 (H) 10 - 35 U/L    ALT 20 6 - 29 U/L   CBC and differential   Result Value Ref Range    White Blood Cell Count 5 1 3 8 - 10 8 Thousand/uL    Red Blood Cell Count 5 22 (H) 3 80 - 5 10 Million/uL    Hemoglobin 17 5 (H) 11 7 - 15 5 g/dL    HCT 50 4 (H) 35 0 - 45 0 %    MCV 96 6 80 0 - 100 0 fL    MCH 33 5 (H) 27 0 - 33 0 pg    MCHC 34 7 32 0 - 36 0 g/dL    RDW 13 6 11 0 - 15 0 %    Platelet Count 839 676 - 400 Thousand/uL    SL AMB MPV 10 1 7 5 - 12 5 fL    Neutrophils (Absolute) 2,428 1,500 - 7,800 cells/uL    Lymphocytes (Absolute) 1,795 850 - 3,900 cells/uL    Monocytes (Absolute) 699 200 - 950 cells/uL    Eosinophils (Absolute) 128 15 - 500 cells/uL    Basophils ABS 51 0 - 200 cells/uL    Neutrophils 47 6 %    Lymphocytes 35 2 %    Monocytes 13 7 %    Eosinophils 2 5 %    Basophils PCT 1 0 %   TSH, 3rd generation   Result Value Ref Range    TSH 0 14 (L) 0 40 - 4 50 mIU/L       No orders of the defined types were placed in this encounter  ALLERGIES:  No Known Allergies    Current Medications     Current Outpatient Medications   Medication Sig Dispense Refill    ALPRAZolam (XANAX) 0 5 mg tablet TAKE 1 TABLET (0 5 MG TOTAL) BY MOUTH 2 (TWO) TIMES A DAY AS NEEDED (AS NEEDED) 180 tablet 0    amLODIPine (NORVASC) 5 mg tablet Take 1 tablet (5 mg total) by mouth daily 90 tablet 3    bimatoprost (LUMIGAN) 0 01 % ophthalmic drops Apply 1 drop to eye Daily      brimonidine (ALPHAGAN P) 0 1 % Apply 1 drop to eye every 12 (twelve) hours      citalopram (CeleXA) 10 mg tablet Take 1 tablet (10 mg total) by mouth daily 90 tablet 3    levothyroxine 50 mcg tablet Take 1 tablet (50 mcg total) by mouth daily 90 tablet 1    hydrocortisone (ANUSOL-HC) 2 5 % rectal cream Apply topically 2 (two) times a day 28 g 5     No current facility-administered medications for this visit           Health Maintenance     Health Maintenance   Topic Date Due    Hepatitis C Screening  Never done    SLP PLAN OF CARE  Never done    BMI: Followup Plan  Never done    DTaP,Tdap,and Td Vaccines (1 - Tdap) Never done    Breast Cancer Screening: Mammogram  10/29/2021    Fall Risk  06/01/2022    Medicare Annual Wellness Visit (AWV)  06/01/2022    Depression Remission PHQ  06/01/2022    BMI: Adult  02/22/2023    Colorectal Cancer Screening  06/06/2026    Osteoporosis Screening  Completed    Pneumococcal Vaccine: 65+ Years  Completed    COVID-19 Vaccine  Completed    HIB Vaccine  Aged Out    Hepatitis B Vaccine  Aged Out    IPV Vaccine  Aged Out    Hepatitis A Vaccine  Aged Out    Meningococcal ACWY Vaccine  Aged Out    HPV Vaccine  Aged Out    Influenza Vaccine  Discontinued     Immunization History   Administered Date(s) Administered    COVID-19 PFIZER VACCINE 0 3 ML IM 05/17/2021, 06/15/2021    COVID-19 Pfizer vac (Ozzy-sucrose, gray cap) 12 yr+ IM 02/12/2022    Influenza, seasonal, injectable 09/22/2016  Pneumococcal Conjugate 13-Valent 05/17/2019    Pneumococcal Polysaccharide PPV23 06/12/2017    Zoster 01/04/2016    Zoster Vaccine Recombinant 07/77/8231       Lenora Spears MD    I spent 25 minutes with this patient which greater than 50% was spent counseling reviewing chart

## 2022-02-22 NOTE — ASSESSMENT & PLAN NOTE
Anxiety    Patient continues to use citalopram and Xanax as needed for her chronic intermittent anxiety

## 2022-03-10 DIAGNOSIS — I10 HYPERTENSION, UNSPECIFIED TYPE: ICD-10-CM

## 2022-03-10 RX ORDER — AMLODIPINE BESYLATE 5 MG/1
5 TABLET ORAL DAILY
Qty: 90 TABLET | Refills: 3 | Status: SHIPPED | OUTPATIENT
Start: 2022-03-10

## 2022-04-01 NOTE — PROGRESS NOTES
Assessment and Plan:     Problem List Items Addressed This Visit     None           Preventive health issues were discussed with patient, and age appropriate screening tests were ordered as noted in patient's After Visit Summary  Personalized health advice and appropriate referrals for health education or preventive services given if needed, as noted in patient's After Visit Summary       History of Present Illness:     Patient presents for Medicare Annual Wellness visit    Patient Care Team:  Mark Díaz MD as PCP - Job Boothe MD as PCP - PCP-[address] as PCP - [address] [place] [address],[address] [facility]  Flores MD Julio Díaz MD     Problem List:     Patient Active Problem List   Diagnosis    Hypertension    Anxiety    Hypothyroidism      Past Medical and Surgical History:     Past Medical History:   Diagnosis Date    Anxiety     Last Assessed: 6/12/2017     Depression     FH: colonic polyps     Glaucoma     Hypertension     Last Assessed: 12/12/2017    Hypothyroidism     Last Assessed: 6/12/2017    Menopause     Normal vaginal delivery     Osteopenia     Pap smear abnormality of cervix with ASCUS favoring benign     Telangiectasis      Past Surgical History:   Procedure Laterality Date    CATARACT EXTRACTION      COLONOSCOPY      complete    DILATION AND CURETTAGE OF UTERUS  01/10/1978    LAPAROSCOPY      Diagnostic     TUBAL LIGATION        Family History:     Family History   Problem Relation Age of Onset    Heart disease Mother         Cardiac Disorder     Colonic polyp Mother     Hypertension Mother     Heart disease Father         Cardiac Dsiorder     Hypertension Father     Lung cancer Father     Breast cancer Sister 76    Stomach cancer Sister     Colonic polyp Brother     Lung cancer Brother     Hepatitis Daughter         Alcoholic    Arthritis Family     Lung cancer Family     No Known Problems Maternal Grandmother     No Known Problems Maternal Grandfather     No Known Problems Paternal Grandmother     No Known Problems Paternal Grandfather     No Known Problems Son     Esophageal cancer Sister 64      Social History:     E-Cigarette/Vaping    E-Cigarette Use Never User      E-Cigarette/Vaping Substances    Nicotine No     THC No     CBD No     Flavoring No     Other No     Unknown No      Social History     Socioeconomic History    Marital status: /Civil Union     Spouse name: None    Number of children: None    Years of education: None    Highest education level: None   Occupational History    None   Social Needs    Financial resource strain: None    Food insecurity     Worry: None     Inability: None    Transportation needs     Medical: None     Non-medical: None   Tobacco Use    Smoking status: Current Some Day Smoker    Smokeless tobacco: Never Used    Tobacco comment: smokes and motivated to quit   Light cigarette smoke (1-9 cigarettes per day)    Substance and Sexual Activity    Alcohol use: Yes     Comment: Being A Social Drinker - Drinks wine, social alcohol use     Drug use: No    Sexual activity: Yes   Lifestyle    Physical activity     Days per week: None     Minutes per session: None    Stress: None   Relationships    Social connections     Talks on phone: None     Gets together: None     Attends Spiritism service: None     Active member of club or organization: None     Attends meetings of clubs or organizations: None     Relationship status: None    Intimate partner violence     Fear of current or ex partner: None     Emotionally abused: None     Physically abused: None     Forced sexual activity: None   Other Topics Concern    None   Social History Narrative    Drinks Coffee - 1-2 cup a day     Exercises Daily       Medications and Allergies:     Current Outpatient Medications   Medication Sig Dispense Refill    ALPRAZolam (XANAX) 0 5 mg tablet Take 1 tablet (0 5 mg total) by mouth 2 (two) times a day as needed (as needed) 180 tablet 0    amLODIPine (NORVASC) 5 mg tablet Take 1 tablet (5 mg total) by mouth daily 90 tablet 0    bimatoprost (LUMIGAN) 0 01 % ophthalmic drops Apply 1 drop to eye Daily      brimonidine (ALPHAGAN P) 0 1 % Apply 1 drop to eye every 12 (twelve) hours      citalopram (CeleXA) 10 mg tablet TAKE 1 TABLET DIALY (CELEXA) 90 tablet 1    levothyroxine 75 mcg tablet Take 1 tablet (75 mcg total) by mouth daily 90 tablet 0     No current facility-administered medications for this visit  No Known Allergies   Immunizations:     Immunization History   Administered Date(s) Administered    Influenza, seasonal, injectable 09/22/2016    Pneumococcal Conjugate 13-Valent 05/17/2019    Pneumococcal Polysaccharide PPV23 06/12/2017    SARS-CoV-2 / COVID-19 mRNA IM (Pfizer-BioNTech) 05/17/2021    Zoster 01/04/2016    Zoster Vaccine Recombinant 01/04/2016      Health Maintenance:         Topic Date Due    Hepatitis C Screening  Never done    MAMMOGRAM  10/29/2021    Colorectal Cancer Screening  06/06/2026         Topic Date Due    DTaP,Tdap,and Td Vaccines (1 - Tdap) Never done      Medicare Health Risk Assessment:     /60 (BP Location: Left arm, Patient Position: Sitting, Cuff Size: Adult)   Pulse 103   Temp 98 °F (36 7 °C) (Temporal)   Resp 15   Ht 5' 2" (1 575 m)   Wt 47 2 kg (104 lb)   SpO2 94%   BMI 19 02 kg/m²      Holly Melendez is here for her Subsequent Wellness visit  Health Risk Assessment:   Patient rates overall health as good  Patient feels that their physical health rating is same  Patient is satisfied with their life  Eyesight was rated as same  Hearing was rated as same  Patient feels that their emotional and mental health rating is same  Patients states they are never, rarely angry  Patient states they are sometimes unusually tired/fatigued  Pain experienced in the last 7 days has been none   Patient states that she has experienced no weight loss or gain in last 6 months  Depression Screening:   PHQ-2 Score: 4  PHQ-9 Score: 5      Fall Risk Screening: In the past year, patient has experienced: no history of falling in past year      Urinary Incontinence Screening:   Patient has not leaked urine accidently in the last six months  Home Safety:  Patient does not have trouble with stairs inside or outside of their home  Patient has working smoke alarms and has working carbon monoxide detector  Home safety hazards include: none  Nutrition:   Current diet is Regular  Medications:   Patient is currently taking over-the-counter supplements  OTC medications include: see medication list  Patient is able to manage medications  Activities of Daily Living (ADLs)/Instrumental Activities of Daily Living (IADLs):   Walk and transfer into and out of bed and chair?: Yes  Dress and groom yourself?: Yes    Bathe or shower yourself?: Yes    Feed yourself? Yes  Do your laundry/housekeeping?: Yes  Manage your money, pay your bills and track your expenses?: Yes  Make your own meals?: Yes    Do your own shopping?: Yes    Previous Hospitalizations:   Any hospitalizations or ED visits within the last 12 months?: No      Advance Care Planning:   Living will: No    Durable POA for healthcare: No    Advanced directive: No      PREVENTIVE SCREENINGS      Cardiovascular Screening:    General: Screening Current      Diabetes Screening:     General: Screening Current      Colorectal Cancer Screening:     General: Screening Current      Breast Cancer Screening:     General: Screening Current      Cervical Cancer Screening:    General: Screening Not Indicated      Lung Cancer Screening:     General: Screening Not Indicated    Screening, Brief Intervention, and Referral to Treatment (SBIRT)    Screening  Typical number of drinks in a day: 2  Typical number of drinks in a week: 14  Interpretation: Low risk drinking behavior      Single Item Drug Screening:  How often have you used an illegal drug (including marijuana) or a prescription medication for non-medical reasons in the past year? never    Single Item Drug Screen Score: 0  Interpretation: Negative screen for possible drug use disorder      Chun Wolf MD Depth Of Tumor Invasion (For Histology): tumor not visualized

## 2022-05-23 DIAGNOSIS — E03.9 HYPOTHYROIDISM, UNSPECIFIED TYPE: ICD-10-CM

## 2022-05-23 RX ORDER — LEVOTHYROXINE SODIUM 0.05 MG/1
50 TABLET ORAL DAILY
Qty: 90 TABLET | Refills: 1 | Status: SHIPPED | OUTPATIENT
Start: 2022-05-23

## 2022-07-13 DIAGNOSIS — F41.9 ANXIETY: ICD-10-CM

## 2022-07-13 RX ORDER — CITALOPRAM 10 MG/1
10 TABLET ORAL DAILY
Qty: 90 TABLET | Refills: 3 | Status: SHIPPED | OUTPATIENT
Start: 2022-07-13

## 2022-08-16 ENCOUNTER — RA CDI HCC (OUTPATIENT)
Dept: OTHER | Facility: HOSPITAL | Age: 77
End: 2022-08-16

## 2022-08-16 NOTE — PROGRESS NOTES
[facility]  coding opportunities       Chart reviewed, no opportunity found: CHART REVIEWED, NO OPPORTUNITY FOUND        Patients Insurance     Medicare Insurance: [employer]

## 2022-09-01 DIAGNOSIS — F41.9 ANXIETY: ICD-10-CM

## 2022-09-01 RX ORDER — ALPRAZOLAM 0.5 MG/1
0.5 TABLET ORAL 2 TIMES DAILY PRN
Qty: 180 TABLET | Refills: 0 | Status: SHIPPED | OUTPATIENT
Start: 2022-09-01 | End: 2022-09-15 | Stop reason: SDUPTHER

## 2022-09-13 ENCOUNTER — OFFICE VISIT (OUTPATIENT)
Dept: FAMILY MEDICINE CLINIC | Facility: CLINIC | Age: 77
End: 2022-09-13
Payer: COMMERCIAL

## 2022-09-13 VITALS
TEMPERATURE: 97.4 F | HEIGHT: 62 IN | WEIGHT: 92.25 LBS | BODY MASS INDEX: 16.97 KG/M2 | DIASTOLIC BLOOD PRESSURE: 64 MMHG | OXYGEN SATURATION: 95 % | HEART RATE: 100 BPM | SYSTOLIC BLOOD PRESSURE: 100 MMHG | RESPIRATION RATE: 17 BRPM

## 2022-09-13 DIAGNOSIS — I10 PRIMARY HYPERTENSION: ICD-10-CM

## 2022-09-13 DIAGNOSIS — E03.9 HYPOTHYROIDISM, UNSPECIFIED TYPE: Primary | ICD-10-CM

## 2022-09-13 DIAGNOSIS — R63.4 WEIGHT LOSS: ICD-10-CM

## 2022-09-13 DIAGNOSIS — R53.83 FATIGUE, UNSPECIFIED TYPE: ICD-10-CM

## 2022-09-13 DIAGNOSIS — F41.9 ANXIETY: ICD-10-CM

## 2022-09-13 PROCEDURE — 3078F DIAST BP <80 MM HG: CPT | Performed by: FAMILY MEDICINE

## 2022-09-13 PROCEDURE — 1170F FXNL STATUS ASSESSED: CPT | Performed by: FAMILY MEDICINE

## 2022-09-13 PROCEDURE — 1125F AMNT PAIN NOTED PAIN PRSNT: CPT | Performed by: FAMILY MEDICINE

## 2022-09-13 PROCEDURE — 3074F SYST BP LT 130 MM HG: CPT | Performed by: FAMILY MEDICINE

## 2022-09-13 PROCEDURE — 99214 OFFICE O/P EST MOD 30 MIN: CPT | Performed by: FAMILY MEDICINE

## 2022-09-13 PROCEDURE — 3725F SCREEN DEPRESSION PERFORMED: CPT | Performed by: FAMILY MEDICINE

## 2022-09-13 PROCEDURE — G0439 PPPS, SUBSEQ VISIT: HCPCS | Performed by: FAMILY MEDICINE

## 2022-09-13 PROCEDURE — 3288F FALL RISK ASSESSMENT DOCD: CPT | Performed by: FAMILY MEDICINE

## 2022-09-13 PROCEDURE — 1160F RVW MEDS BY RX/DR IN RCRD: CPT | Performed by: FAMILY MEDICINE

## 2022-09-13 NOTE — PATIENT INSTRUCTIONS
Medicare Preventive Visit Patient Instructions  Thank you for completing your Welcome to Medicare Visit or Medicare Annual Wellness Visit today  Your next wellness visit will be due in one year (9/14/2023)  The screening/preventive services that you may require over the next 5-10 years are detailed below  Some tests may not apply to you based off risk factors and/or age  Screening tests ordered at today's visit but not completed yet may show as past due  Also, please note that scanned in results may not display below  Preventive Screenings:  Service Recommendations Previous Testing/Comments   Colorectal Cancer Screening  * Colonoscopy    * Fecal Occult Blood Test (FOBT)/Fecal Immunochemical Test (FIT)  * Fecal DNA/Cologuard Test  * Flexible Sigmoidoscopy Age: 39-70 years old   Colonoscopy: every 10 years (may be performed more frequently if at higher risk)  OR  FOBT/FIT: every 1 year  OR  Cologuard: every 3 years  OR  Sigmoidoscopy: every 5 years  Screening may be recommended earlier than age 39 if at higher risk for colorectal cancer  Also, an individualized decision between you and your healthcare provider will decide whether screening between the ages of 74-80 would be appropriate  Colonoscopy: 06/06/2016  FOBT/FIT: Not on file  Cologuard: Not on file  Sigmoidoscopy: Not on file    Screening Current     Breast Cancer Screening Age: 36 years old  Frequency: every 1-2 years  Not required if history of left and right mastectomy Mammogram: 10/29/2019        Cervical Cancer Screening Between the ages of 21-29, pap smear recommended once every 3 years  Between the ages of 33-67, can perform pap smear with HPV co-testing every 5 years     Recommendations may differ for women with a history of total hysterectomy, cervical cancer, or abnormal pap smears in past  Pap Smear: Not on file    Screening Not Indicated   Hepatitis C Screening Once for adults born between 1945 and 1965  More frequently in patients at high risk for Hepatitis C Hep C Antibody: Not on file        Diabetes Screening 1-2 times per year if you're at risk for diabetes or have pre-diabetes Fasting glucose: No results in last 5 years (No results in last 5 years)  A1C: No results in last 5 years (No results in last 5 years)  Screening Current   Cholesterol Screening Once every 5 years if you don't have a lipid disorder  May order more often based on risk factors  Lipid panel: 02/16/2022    Screening Current     Other Preventive Screenings Covered by Medicare:  1  Abdominal Aortic Aneurysm (AAA) Screening: covered once if your at risk  You're considered to be at risk if you have a family history of AAA  2  Lung Cancer Screening: covers low dose CT scan once per year if you meet all of the following conditions: (1) Age 50-69; (2) No signs or symptoms of lung cancer; (3) Current smoker or have quit smoking within the last 15 years; (4) You have a tobacco smoking history of at least 20 pack years (packs per day multiplied by number of years you smoked); (5) You get a written order from a healthcare provider  3  Glaucoma Screening: covered annually if you're considered high risk: (1) You have diabetes OR (2) Family history of glaucoma OR (3)  aged 48 and older OR (3)  American aged 72 and older  3  Osteoporosis Screening: covered every 2 years if you meet one of the following conditions: (1) You're estrogen deficient and at risk for osteoporosis based off medical history and other findings; (2) Have a vertebral abnormality; (3) On glucocorticoid therapy for more than 3 months; (4) Have primary hyperparathyroidism; (5) On osteoporosis medications and need to assess response to drug therapy  · Last bone density test (DXA Scan): 04/25/2016   5  HIV Screening: covered annually if you're between the age of 15-65  Also covered annually if you are younger than 13 and older than 72 with risk factors for HIV infection   For pregnant patients, it is covered up to 3 times per pregnancy  Immunizations:  Immunization Recommendations   Influenza Vaccine Annual influenza vaccination during flu season is recommended for all persons aged >= 6 months who do not have contraindications   Pneumococcal Vaccine   * Pneumococcal conjugate vaccine = PCV13 (Prevnar 13), PCV15 (Vaxneuvance), PCV20 (Prevnar 20)  * Pneumococcal polysaccharide vaccine = PPSV23 (Pneumovax) Adults 25-60 years old: 1-3 doses may be recommended based on certain risk factors  Adults 72 years old: 1-2 doses may be recommended based off what pneumonia vaccine you previously received   Hepatitis B Vaccine 3 dose series if at intermediate or high risk (ex: diabetes, end stage renal disease, liver disease)   Tetanus (Td) Vaccine - COST NOT COVERED BY MEDICARE PART B Following completion of primary series, a booster dose should be given every 10 years to maintain immunity against tetanus  Td may also be given as tetanus wound prophylaxis  Tdap Vaccine - COST NOT COVERED BY MEDICARE PART B Recommended at least once for all adults  For pregnant patients, recommended with each pregnancy  Shingles Vaccine (Shingrix) - COST NOT COVERED BY MEDICARE PART B  2 shot series recommended in those aged 48 and above     Health Maintenance Due:      Topic Date Due    Hepatitis C Screening  Never done    Breast Cancer Screening: Mammogram  10/29/2021    Colorectal Cancer Screening  06/06/2026     Immunizations Due:      Topic Date Due    COVID-19 Vaccine (4 - Booster for Calix Peter series) 06/12/2022     Advance Directives   What are advance directives? Advance directives are legal documents that state your wishes and plans for medical care  These plans are made ahead of time in case you lose your ability to make decisions for yourself  Advance directives can apply to any medical decision, such as the treatments you want, and if you want to donate organs  What are the types of advance directives?   There are many types of advance directives, and each state has rules about how to use them  You may choose a combination of any of the following:  · Living will: This is a written record of the treatment you want  You can also choose which treatments you do not want, which to limit, and which to stop at a certain time  This includes surgery, medicine, IV fluid, and tube feedings  · Durable power of  for healthcare [facility]): This is a written record that states who you want to make healthcare choices for you when you are unable to make them for yourself  This person, called a proxy, is usually a family member or a friend  You may choose more than 1 proxy  · Do not resuscitate (DNR) order:  A DNR order is used in case your heart stops beating or you stop breathing  It is a request not to have certain forms of treatment, such as CPR  A DNR order may be included in other types of advance directives  · Medical directive: This covers the care that you want if you are in a coma, near death, or unable to make decisions for yourself  You can list the treatments you want for each condition  Treatment may include pain medicine, surgery, blood transfusions, dialysis, IV or tube feedings, and a ventilator (breathing machine)  · Values history: This document has questions about your views, beliefs, and how you feel and think about life  This information can help others choose the care that you would choose  Why are advance directives important? An advance directive helps you control your care  Although spoken wishes may be used, it is better to have your wishes written down  Spoken wishes can be misunderstood, or not followed  Treatments may be given even if you do not want them  An advance directive may make it easier for your family to make difficult choices about your care  Fall Prevention    Fall prevention  includes ways to make your home and other areas safer   It also includes ways you can move more carefully to prevent a fall  Health conditions that cause changes in your blood pressure, vision, or muscle strength and coordination may increase your risk for falls  Medicines may also increase your risk for falls if they make you dizzy, weak, or sleepy  Fall prevention tips:   · Stand or sit up slowly  · Use assistive devices as directed  · Wear shoes that fit well and have soles that   · Wear a personal alarm  · Stay active  · Manage your medical conditions  Home Safety Tips:  · Add items to prevent falls in the bathroom  · Keep paths clear  · Install bright lights in your home  · Keep items you use often on shelves within reach  · Paint or place reflective tape on the edges of your stairs  Urinary Incontinence   Urinary incontinence (UI)  is when you lose control of your bladder  UI develops because your bladder cannot store or empty urine properly  The 3 most common types of UI are stress incontinence, urge incontinence, or both  Medicines:   · May be given to help strengthen your bladder control  Report any side effects of medication to your healthcare provider  Do pelvic muscle exercises often:  Your pelvic muscles help you stop urinating  Squeeze these muscles tight for 5 seconds, then relax for 5 seconds  Gradually work up to squeezing for 10 seconds  Do 3 sets of 15 repetitions a day, or as directed  This will help strengthen your pelvic muscles and improve bladder control  Train your bladder:  Go to the bathroom at set times, such as every 2 hours, even if you do not feel the urge to go  You can also try to hold your urine when you feel the urge to go  For example, hold your urine for 5 minutes when you feel the urge to go  As that becomes easier, hold your urine for 10 minutes  Self-care:   · Keep a UI record  Write down how often you leak urine and how much you leak  Make a note of what you were doing when you leaked urine  · Drink liquids as directed   You may need to limit the amount of liquid you drink to help control your urine leakage  Do not drink any liquid right before you go to bed  Limit or do not have drinks that contain caffeine or alcohol  · Prevent constipation  Eat a variety of high-fiber foods  Good examples are high-fiber cereals, beans, vegetables, and whole-grain breads  Walking is the best way to trigger your intestines to have a bowel movement  · Exercise regularly and maintain a healthy weight  Weight loss and exercise will decrease pressure on your bladder and help you control your leakage  · Use a catheter as directed  to help empty your bladder  A catheter is a tiny, plastic tube that is put into your bladder to drain your urine  · Go to behavior therapy as directed  Behavior therapy may be used to help you learn to control your urge to urinate  Cigarette Smoking and Your Health   Risks to your health if you smoke:  Nicotine and other chemicals found in tobacco damage every cell in your body  Even if you are a light smoker, you have an increased risk for cancer, heart disease, and lung disease  If you are pregnant or have diabetes, smoking increases your risk for complications  Benefits to your health if you stop smoking:   · You decrease respiratory symptoms such as coughing, wheezing, and shortness of breath  · You reduce your risk for cancers of the lung, mouth, throat, kidney, bladder, pancreas, stomach, and cervix  If you already have cancer, you increase the benefits of chemotherapy  You also reduce your risk for cancer returning or a second cancer from developing  · You reduce your risk for heart disease, blood clots, heart attack, and stroke  · You reduce your risk for lung infections, and diseases such as pneumonia, asthma, chronic bronchitis, and emphysema  · Your circulation improves  More oxygen can be delivered to your body   If you have diabetes, you lower your risk for complications, such as kidney, artery, and eye diseases  You also lower your risk for nerve damage  Nerve damage can lead to amputations, poor vision, and blindness  · You improve your body's ability to heal and to fight infections  For more information and support to stop smoking:   · Takes  Phone: 9- 142 - [phone]  Web Address: [employer]  Underweight  Underweight is defined as having a body mass index (BMI) of less than 18 5 kg/m2   Anorexia  is a loss of appetite, decreased food intake, or both  Your appetite naturally decreases as you get older  You also get full faster than you used to  This occurs because your body needs less energy  Other body changes can also lead to a decreased appetite  Even though some appetite loss is normal, you still need to get enough calories and nutrients to keep you healthy  You can start to lose too much weight if you do not eat as much food as your body needs  Unwanted weight loss can cause health problems, or worsen health problems you already have  You can also become dehydrated if you do not drink enough liquid  How to eat healthy and get enough nutrients:   · Choose healthy foods  Eat a variety of fruits, vegetables, whole grains, low-fat dairy foods, lean meats, and other protein foods  Limit foods high in fat, sugar, and salt  Limit or avoid alcohol as directed  Work with a dietitian to help you plan your meals if you need to follow a special diet  A dietitian can also teach you how to modify foods if you have trouble chewing or swallowing  · Snack on healthy foods between meals  if you only eat a small amount during meals  Snacks provide extra healthy nutrients and calories between meals  Examples include fruit, cheese, and whole grain crackers  · Drink liquids as directed  to avoid dehydration  Drink liquids between meals if they cause you to get full too quickly during meals  Ask how much liquid to drink each day and which liquids are best for you     · Use herbs, spices, and flavor enhancers to add flavor to foods  Avoid using herbs and spice blends that also contain sodium  Ask your healthcare provider or dietitian about flavor enhancers  Flavor enhancers with ham, natural daly, and roast beef flavors can also be sprinkled on food to add flavor  · Share meals with others as often as you can  Eating with others may help you to eat better during meal time  Ask family members, neighbors, or friends to join you for lunch  There are also senior centers where you can meet people, and share meals with them  · Ask family and friends for help  with shopping or preparing foods  Ask for a ride to the grocery store, if needed  © Copyright [employer] 2018 Information is for End User's use only and may not be sold, redistributed or otherwise used for commercial purposes   All illustrations and images included in CareNotes® are the copyrighted property of A [employer] A M [employer]  or [address] [place] [address] [employer]

## 2022-09-13 NOTE — ASSESSMENT & PLAN NOTE
Anxiety    Patient was given prescription to increase her Xanax to 1 mg b i d  and continue with current dose of citalopram   Hopefully this will help patient's stomach and allow her to eat more liberally

## 2022-09-13 NOTE — PROGRESS NOTES
Assessment and Plan:     Problem List Items Addressed This Visit        Endocrine    Hypothyroidism - Primary     Hypothyroidism  Patient will have TSH blood work and continue with current dose of levothyroxine         Relevant Orders    CBC    Comprehensive metabolic panel    Lipid panel    TSH, 3rd generation       Cardiovascular and Mediastinum    Hypertension     Hypertension  Patient blood pressure is stable at this time she will continue current regimen of medications  She will obtain blood work as ordered for further evaluation  We will make further recommendations pending results of test             Other    Anxiety     Anxiety  Patient was given prescription to increase her Xanax to 1 mg b i d  and continue with current dose of citalopram   Hopefully this will help patient's stomach and allow her to eat more liberally           Other Visit Diagnoses     Fatigue, unspecified type        Relevant Orders    CBC    Comprehensive metabolic panel    Lipid panel    TSH, 3rd generation    Weight loss        Relevant Orders    Sedimentation rate, automated           Preventive health issues were discussed with patient, and age appropriate screening tests were ordered as noted in patient's After Visit Summary  Personalized health advice and appropriate referrals for health education or preventive services given if needed, as noted in patient's After Visit Summary  History of Present Illness:     Patient presents for a Medicare Wellness Visit    Patient the office to review chronic medical condition  She states 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 she and her  do not go out very much and have consume much less calories with their meals  Patient has constant anxiety and tremors despite use of Xanax 0 5 mg b i d   She often awakens with a nervous stomach and does not feel like eating    Her  has had more medical conditions which prevent him from walking any particular distance  Patient is required to take on just about all home chores  Patient has been trying to consume homemade milkshakes to try and get more calories  Patient did have hemorrhoidectomy and colonoscopy in the near past     Patient Care Team:  Naomy Estrada MD as PCP - General  Naomy Estrada MD as PCP - PCP-[facility] as PCP - [address] [place] [address],[address] [facility] ([facility])  MD Reece Lopez MD     Review of Systems:     Review of Systems   Constitutional: Positive for fatigue and unexpected weight change  HENT: Negative  Eyes: Negative  Respiratory: Negative  Cardiovascular: Negative  Gastrointestinal: Negative  Genitourinary: Negative  Musculoskeletal: Negative  Skin: Negative  Neurological: Negative  Psychiatric/Behavioral: The patient is nervous/anxious           Problem List:     Patient Active Problem List   Diagnosis    Hypertension    Anxiety    Hypothyroidism    Depression, recurrent ([address] )    Diarrhea    Hemorrhoids      Past Medical and Surgical History:     Past Medical History:   Diagnosis Date    Anxiety     Last Assessed: 6/12/2017     Depression     FH: colonic polyps     Glaucoma     Hypertension     Last Assessed: 12/12/2017    Hypothyroidism     Last Assessed: 6/12/2017    Menopause     Normal vaginal delivery     Osteopenia     Pap smear abnormality of cervix with ASCUS favoring benign     Telangiectasis      Past Surgical History:   Procedure Laterality Date    CATARACT EXTRACTION      COLONOSCOPY      complete    DILATION AND CURETTAGE OF UTERUS  01/10/1978    LAPAROSCOPY      Diagnostic     TUBAL LIGATION        Family History:     Family History   Problem Relation Age of Onset    Heart disease Mother         Cardiac Disorder     Colonic polyp Mother     Hypertension Mother     Heart disease Father         Cardiac Dsiorder  Hypertension Father     Lung cancer Father     Breast cancer Sister 76    Stomach cancer Sister     Colonic polyp Brother     Lung cancer Brother     Hepatitis Daughter         Alcoholic    Arthritis Family     Lung cancer Family     No Known Problems Maternal Grandmother     No Known Problems Maternal Grandfather     No Known Problems Paternal Grandmother     No Known Problems Paternal Grandfather     No Known Problems Son     Esophageal cancer Sister 64      Social History:     Social History     Socioeconomic History    Marital status: /Civil Union     Spouse name: None    Number of children: None    Years of education: None    Highest education level: None   Occupational History    None   Tobacco Use    Smoking status: Current Some Day Smoker    Smokeless tobacco: Never Used    Tobacco comment: smokes and motivated to quit  Light cigarette smoke (1-9 cigarettes per day)    Vaping Use    Vaping Use: Never used   Substance and Sexual Activity    Alcohol use: Yes     Comment: Being A Social Drinker - Drinks wine, social alcohol use     Drug use: No    Sexual activity: Yes   Other Topics Concern    None   Social History Narrative    Drinks Coffee - 1-2 cup a day     Exercises Daily      Social Determinants of Health     Financial Resource Strain: Low Risk     Difficulty of Paying Living Expenses: Not very hard   Food Insecurity: Not on file   Transportation Needs: No Transportation Needs    Lack of Transportation (Medical): No    Lack of Transportation (Non-Medical):  No   Physical Activity: Not on file   Stress: Not on file   Social Connections: Not on file   Intimate Partner Violence: Not on file   Housing Stability: Not on file      Medications and Allergies:     Current Outpatient Medications   Medication Sig Dispense Refill    ALPRAZolam (XANAX) 0 5 mg tablet Take 1 tablet (0 5 mg total) by mouth 2 (two) times a day as needed (as needed) 180 tablet 0    amLODIPine (NORVASC) 5 mg tablet TAKE 1 TABLET (5 MG TOTAL) BY MOUTH DAILY 90 tablet 3    citalopram (CeleXA) 10 mg tablet TAKE 1 TABLET (10 MG TOTAL) BY MOUTH DAILY 90 tablet 3    hydrocortisone (ANUSOL-HC) 2 5 % rectal cream Apply topically 2 (two) times a day 28 g 5    levothyroxine 50 mcg tablet TAKE 1 TABLET (50 MCG TOTAL) BY MOUTH DAILY 90 tablet 1    bimatoprost (LUMIGAN) 0 01 % ophthalmic drops Apply 1 drop to eye Daily (Patient not taking: Reported on 9/13/2022)      brimonidine (ALPHAGAN P) 0 1 % Apply 1 drop to eye every 12 (twelve) hours (Patient not taking: Reported on 9/13/2022)       No current facility-administered medications for this visit  No Known Allergies   Immunizations:     Immunization History   Administered Date(s) Administered    COVID-19 PFIZER VACCINE 0 3 ML IM 05/17/2021, 06/15/2021    COVID-19 Pfizer vac (Ozzy-sucrose, gray cap) 12 yr+ IM 02/12/2022    Influenza, seasonal, injectable 09/22/2016    Pneumococcal Conjugate 13-Valent 05/17/2019    Pneumococcal Polysaccharide PPV23 06/12/2017    Zoster 01/04/2016    Zoster Vaccine Recombinant 01/04/2016      Health Maintenance:         Topic Date Due    Hepatitis C Screening  Never done    Breast Cancer Screening: Mammogram  10/29/2021    Colorectal Cancer Screening  06/06/2026         Topic Date Due    COVID-19 Vaccine (4 - Booster for Calix Peter series) 06/12/2022      Medicare Screening Tests and Risk Assessments:         Health Risk Assessment:   Patient rates overall health as fair  Patient feels that their physical health rating is slightly worse  Patient is satisfied with their life  Eyesight was rated as same  Hearing was rated as same  Patient feels that their emotional and mental health rating is slightly better  Patients states they are never, rarely angry  Patient states they are never, rarely unusually tired/fatigued  Pain experienced in the last 7 days has been none   Patient states that she has experienced no weight loss or gain in last 6 months  Depression Screening:   PHQ-9 Score: 10      Fall Risk Screening: In the past year, patient has experienced: history of falling in past year    Number of falls: 1  Injured during fall?: No    Feels unsteady when standing or walking?: No    Worried about falling?: Yes      Urinary Incontinence Screening:   Patient has leaked urine accidently in the last six months  Home Safety:  Patient does not have trouble with stairs inside or outside of their home  Patient has working smoke alarms and has working carbon monoxide detector  Home safety hazards include: none  Nutrition:   Current diet is Regular  Medications:   Patient is currently taking over-the-counter supplements  OTC medications include: see medication list  Patient is able to manage medications  Activities of Daily Living (ADLs)/Instrumental Activities of Daily Living (IADLs):   Walk and transfer into and out of bed and chair?: Yes  Dress and groom yourself?: Yes    Bathe or shower yourself?: Yes    Feed yourself?  Yes  Do your laundry/housekeeping?: Yes  Manage your money, pay your bills and track your expenses?: Yes  Make your own meals?: Yes    Do your own shopping?: Yes    Previous Hospitalizations:   Any hospitalizations or ED visits within the last 12 months?: No      Advance Care Planning:   Living will: No      PREVENTIVE SCREENINGS      Cardiovascular Screening:    General: Screening Current      Diabetes Screening:     General: Screening Current      Colorectal Cancer Screening:     General: Screening Current      Breast Cancer Screening:     General: Screening Not Indicated      Cervical Cancer Screening:    General: Screening Not Indicated      Lung Cancer Screening:     General: Screening Not Indicated    Screening, Brief Intervention, and Referral to Treatment (SBIRT)    Screening    Typical number of drinks in a week: 0    Single Item Drug Screening:  How often have you used an illegal drug (including marijuana) or a prescription medication for non-medical reasons in the past year? never    Single Item Drug Screen Score: 0  Interpretation: Negative screen for possible drug use disorder    No exam data present     Physical Exam:     /64   Pulse 100   Temp (!) 97 4 °F (36 3 °C)   Resp 17   Ht 5' 2" (1 575 m)   Wt 41 8 kg (92 lb 4 oz)   SpO2 95%   BMI 16 87 kg/m²     Physical Exam  Vitals and nursing note reviewed  Constitutional:       General: She is not in acute distress  Appearance: She is well-developed  She is not ill-appearing  HENT:      Head: Normocephalic and atraumatic  Eyes:      General:         Right eye: No discharge  Left eye: No discharge  Extraocular Movements: Extraocular movements intact  Conjunctiva/sclera: Conjunctivae normal       Pupils: Pupils are equal, round, and reactive to light  Cardiovascular:      Rate and Rhythm: Normal rate and regular rhythm  Heart sounds: No murmur heard  Pulmonary:      Effort: Pulmonary effort is normal  No respiratory distress  Breath sounds: Normal breath sounds  Abdominal:      General: Abdomen is flat  Bowel sounds are normal  There is no distension  Palpations: Abdomen is soft  Tenderness: There is no abdominal tenderness  There is no guarding or rebound  Musculoskeletal:      Cervical back: Neck supple  Right lower leg: No edema  Left lower leg: No edema  Skin:     General: Skin is warm and dry  Neurological:      General: No focal deficit present  Mental Status: She is alert and oriented to person, place, and time  Mental status is at baseline  Psychiatric:         Mood and Affect: Mood normal          Behavior: Behavior normal          Thought Content:  Thought content normal          Judgment: Judgment normal       I spent 25 minutes with this patient which greater than 50 percent was spent counseling or reviewing chart    Aman Mendoza MD

## 2022-09-15 DIAGNOSIS — F41.9 ANXIETY: ICD-10-CM

## 2022-09-15 RX ORDER — ALPRAZOLAM 1 MG/1
1 TABLET ORAL 2 TIMES DAILY PRN
Qty: 180 TABLET | Refills: 1 | Status: SHIPPED | OUTPATIENT
Start: 2022-09-15 | End: 2022-12-13

## 2022-09-30 ENCOUNTER — VBI (OUTPATIENT)
Dept: ADMINISTRATIVE | Facility: OTHER | Age: 77
End: 2022-09-30

## 2022-10-29 DIAGNOSIS — E03.9 HYPOTHYROIDISM, UNSPECIFIED TYPE: ICD-10-CM

## 2022-10-29 RX ORDER — LEVOTHYROXINE SODIUM 0.05 MG/1
50 TABLET ORAL DAILY
Qty: 90 TABLET | Refills: 1 | Status: SHIPPED | OUTPATIENT
Start: 2022-10-29

## 2023-01-30 ENCOUNTER — ESTABLISHED COMPREHENSIVE EXAM (OUTPATIENT)
Dept: URBAN - METROPOLITAN AREA CLINIC 6 | Facility: CLINIC | Age: 78
End: 2023-01-30

## 2023-01-30 DIAGNOSIS — H40.1131: ICD-10-CM

## 2023-01-30 DIAGNOSIS — H04.123: ICD-10-CM

## 2023-01-30 DIAGNOSIS — Z96.1: ICD-10-CM

## 2023-01-30 PROCEDURE — 92020 GONIOSCOPY: CPT

## 2023-01-30 PROCEDURE — 92133 CPTRZD OPH DX IMG PST SGM ON: CPT

## 2023-01-30 PROCEDURE — 92202 OPSCPY EXTND ON/MAC DRAW: CPT

## 2023-01-30 PROCEDURE — 92014 COMPRE OPH EXAM EST PT 1/>: CPT

## 2023-01-30 ASSESSMENT — VISUAL ACUITY
OS_SC: 20/40+2
OS_PH: 20/30-2
OD_SC: 20/70-2

## 2023-01-30 ASSESSMENT — TONOMETRY
OD_IOP_MMHG: 24
OS_IOP_MMHG: 28

## 2023-02-12 ENCOUNTER — APPOINTMENT (EMERGENCY)
Dept: RADIOLOGY | Facility: HOSPITAL | Age: 78
End: 2023-02-12

## 2023-02-12 ENCOUNTER — HOSPITAL ENCOUNTER (INPATIENT)
Facility: HOSPITAL | Age: 78
LOS: 2 days | Discharge: HOME | End: 2023-02-14
Attending: SURGERY | Admitting: SURGERY

## 2023-02-12 DIAGNOSIS — S06.5XAA SDH (SUBDURAL HEMATOMA): Primary | ICD-10-CM

## 2023-02-12 PROBLEM — F41.9 ANXIETY: Status: ACTIVE | Noted: 2023-02-12

## 2023-02-12 PROBLEM — I10 HTN (HYPERTENSION): Status: ACTIVE | Noted: 2023-02-12

## 2023-02-12 PROBLEM — E03.9 HYPOTHYROIDISM: Status: ACTIVE | Noted: 2023-02-12

## 2023-02-12 PROBLEM — S01.01XA SCALP LACERATION: Status: ACTIVE | Noted: 2023-02-12

## 2023-02-12 LAB
ABO GROUP BLD: NORMAL
ANION GAP SERPL CALCULATED.3IONS-SCNC: 4 MMOL/L (ref 4–13)
BASE EXCESS BLDA CALC-SCNC: 4 MMOL/L (ref -2–3)
BASOPHILS # BLD AUTO: 0.04 THOUSANDS/ÂΜL (ref 0–0.1)
BASOPHILS NFR BLD AUTO: 0 % (ref 0–1)
BLD GP AB SCN SERPL QL: NEGATIVE
BUN SERPL-MCNC: 7 MG/DL (ref 5–25)
CA-I BLD-SCNC: 1.14 MMOL/L (ref 1.12–1.32)
CALCIUM SERPL-MCNC: 8.8 MG/DL (ref 8.3–10.1)
CARDIAC TROPONIN I PNL SERPL HS: <2 NG/L
CHLORIDE SERPL-SCNC: 104 MMOL/L (ref 96–108)
CK SERPL-CCNC: 57 U/L (ref 26–192)
CO2 SERPL-SCNC: 30 MMOL/L (ref 21–32)
CREAT SERPL-MCNC: 0.47 MG/DL (ref 0.6–1.3)
EOSINOPHIL # BLD AUTO: 0.05 THOUSAND/ÂΜL (ref 0–0.61)
EOSINOPHIL NFR BLD AUTO: 0 % (ref 0–6)
ERYTHROCYTE [DISTWIDTH] IN BLOOD BY AUTOMATED COUNT: 14.3 % (ref 11.6–15.1)
GFR SERPL CREATININE-BSD FRML MDRD: 96 ML/MIN/1.73SQ M
GLUCOSE SERPL-MCNC: 113 MG/DL (ref 65–140)
GLUCOSE SERPL-MCNC: 115 MG/DL (ref 65–140)
HCO3 BLDA-SCNC: 30.8 MMOL/L (ref 24–30)
HCT VFR BLD AUTO: 41 % (ref 34.8–46.1)
HCT VFR BLD AUTO: 41.1 % (ref 34.8–46.1)
HCT VFR BLD CALC: 46 % (ref 34.8–46.1)
HGB BLD-MCNC: 13.9 G/DL (ref 11.5–15.4)
HGB BLD-MCNC: 14 G/DL (ref 11.5–15.4)
HGB BLDA-MCNC: 15.6 G/DL (ref 11.5–15.4)
HOLD SPECIMEN: NORMAL
IMM GRANULOCYTES # BLD AUTO: 0.08 THOUSAND/UL (ref 0–0.2)
IMM GRANULOCYTES NFR BLD AUTO: 1 % (ref 0–2)
LYMPHOCYTES # BLD AUTO: 1.82 THOUSANDS/ÂΜL (ref 0.6–4.47)
LYMPHOCYTES NFR BLD AUTO: 12 % (ref 14–44)
MCH RBC QN AUTO: 34.7 PG (ref 26.8–34.3)
MCHC RBC AUTO-ENTMCNC: 33.8 G/DL (ref 31.4–37.4)
MCV RBC AUTO: 103 FL (ref 82–98)
MONOCYTES # BLD AUTO: 0.97 THOUSAND/ÂΜL (ref 0.17–1.22)
MONOCYTES NFR BLD AUTO: 6 % (ref 4–12)
NEUTROPHILS # BLD AUTO: 12.83 THOUSANDS/ÂΜL (ref 1.85–7.62)
NEUTS SEG NFR BLD AUTO: 81 % (ref 43–75)
NRBC BLD AUTO-RTO: 0 /100 WBCS
PCO2 BLD: 32 MMOL/L (ref 21–32)
PCO2 BLD: 53.8 MM HG (ref 42–50)
PH BLD: 7.37 [PH] (ref 7.3–7.4)
PLATELET # BLD AUTO: 248 THOUSANDS/UL (ref 149–390)
PMV BLD AUTO: 9.5 FL (ref 8.9–12.7)
PO2 BLD: 24 MM HG (ref 35–45)
POTASSIUM BLD-SCNC: 3.8 MMOL/L (ref 3.5–5.3)
POTASSIUM SERPL-SCNC: 3.8 MMOL/L (ref 3.5–5.3)
RBC # BLD AUTO: 4.01 MILLION/UL (ref 3.81–5.12)
RH BLD: NEGATIVE
SAO2 % BLD FROM PO2: 38 % (ref 60–85)
SODIUM BLD-SCNC: 140 MMOL/L (ref 136–145)
SODIUM SERPL-SCNC: 138 MMOL/L (ref 135–147)
SPECIMEN EXPIRATION DATE: NORMAL
SPECIMEN SOURCE: ABNORMAL
WBC # BLD AUTO: 15.79 THOUSAND/UL (ref 4.31–10.16)

## 2023-02-12 PROCEDURE — 0HQ0XZZ REPAIR SCALP SKIN, EXTERNAL APPROACH: ICD-10-PCS | Performed by: STUDENT IN AN ORGANIZED HEALTH CARE EDUCATION/TRAINING PROGRAM

## 2023-02-12 RX ORDER — CITALOPRAM 10 MG/1
10 TABLET ORAL DAILY
Status: DISCONTINUED | OUTPATIENT
Start: 2023-02-13 | End: 2023-02-14 | Stop reason: HOSPADM

## 2023-02-12 RX ORDER — LIDOCAINE HYDROCHLORIDE AND EPINEPHRINE BITARTRATE 20; .01 MG/ML; MG/ML
20 INJECTION, SOLUTION SUBCUTANEOUS ONCE
Status: COMPLETED | OUTPATIENT
Start: 2023-02-12 | End: 2023-02-12

## 2023-02-12 RX ORDER — SENNOSIDES 8.6 MG
2 TABLET ORAL DAILY
Status: DISCONTINUED | OUTPATIENT
Start: 2023-02-13 | End: 2023-02-14 | Stop reason: HOSPADM

## 2023-02-12 RX ORDER — DOCUSATE SODIUM 100 MG/1
100 CAPSULE, LIQUID FILLED ORAL 2 TIMES DAILY
Status: DISCONTINUED | OUTPATIENT
Start: 2023-02-12 | End: 2023-02-14 | Stop reason: HOSPADM

## 2023-02-12 RX ORDER — SODIUM CHLORIDE, SODIUM LACTATE, POTASSIUM CHLORIDE, CALCIUM CHLORIDE 600; 310; 30; 20 MG/100ML; MG/100ML; MG/100ML; MG/100ML
500 INJECTION, SOLUTION INTRAVENOUS ONCE
Status: COMPLETED | OUTPATIENT
Start: 2023-02-12 | End: 2023-02-13

## 2023-02-12 RX ORDER — AMLODIPINE BESYLATE 5 MG/1
5 TABLET ORAL DAILY
Status: DISCONTINUED | OUTPATIENT
Start: 2023-02-13 | End: 2023-02-14 | Stop reason: HOSPADM

## 2023-02-12 RX ORDER — ONDANSETRON 2 MG/ML
4 INJECTION INTRAMUSCULAR; INTRAVENOUS EVERY 6 HOURS PRN
Status: DISCONTINUED | OUTPATIENT
Start: 2023-02-12 | End: 2023-02-14 | Stop reason: HOSPADM

## 2023-02-12 RX ORDER — LEVOTHYROXINE SODIUM 0.05 MG/1
50 TABLET ORAL
Status: DISCONTINUED | OUTPATIENT
Start: 2023-02-13 | End: 2023-02-14 | Stop reason: HOSPADM

## 2023-02-12 RX ORDER — ACETAMINOPHEN 325 MG/1
650 TABLET ORAL EVERY 6 HOURS PRN
Status: DISCONTINUED | OUTPATIENT
Start: 2023-02-12 | End: 2023-02-14 | Stop reason: HOSPADM

## 2023-02-12 RX ORDER — ALPRAZOLAM 0.5 MG/1
1 TABLET ORAL 2 TIMES DAILY PRN
Status: DISCONTINUED | OUTPATIENT
Start: 2023-02-12 | End: 2023-02-13

## 2023-02-12 RX ORDER — LEVETIRACETAM 500 MG/1
500 TABLET ORAL 2 TIMES DAILY
Status: DISCONTINUED | OUTPATIENT
Start: 2023-02-12 | End: 2023-02-14 | Stop reason: HOSPADM

## 2023-02-12 RX ADMIN — TETANUS TOXOID, REDUCED DIPHTHERIA TOXOID AND ACELLULAR PERTUSSIS VACCINE, ADSORBED 0.5 ML: 5; 2.5; 8; 8; 2.5 SUSPENSION INTRAMUSCULAR at 19:28

## 2023-02-12 RX ADMIN — LIDOCAINE HYDROCHLORIDE,EPINEPHRINE BITARTRATE 20 ML: 20; .01 INJECTION, SOLUTION INFILTRATION; PERINEURAL at 20:15

## 2023-02-12 RX ADMIN — LEVETIRACETAM 500 MG: 500 TABLET, FILM COATED ORAL at 22:01

## 2023-02-12 RX ADMIN — SODIUM CHLORIDE, SODIUM LACTATE, POTASSIUM CHLORIDE, AND CALCIUM CHLORIDE 500 ML: .6; .31; .03; .02 INJECTION, SOLUTION INTRAVENOUS at 21:46

## 2023-02-13 ENCOUNTER — APPOINTMENT (INPATIENT)
Dept: RADIOLOGY | Facility: HOSPITAL | Age: 78
End: 2023-02-13

## 2023-02-13 PROBLEM — F10.10 ALCOHOL ABUSE: Status: ACTIVE | Noted: 2023-02-13
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
## 2023-02-13 LAB
ALBUMIN SERPL BCP-MCNC: 2.6 G/DL (ref 3.5–5)
ALP SERPL-CCNC: 89 U/L (ref 46–116)
ALT SERPL W P-5'-P-CCNC: 10 U/L (ref 12–78)
ANION GAP SERPL CALCULATED.3IONS-SCNC: 3 MMOL/L (ref 4–13)
AST SERPL W P-5'-P-CCNC: 16 U/L (ref 5–45)
ATRIAL RATE: 73 BPM
BILIRUB SERPL-MCNC: 1.08 MG/DL (ref 0.2–1)
BUN SERPL-MCNC: 9 MG/DL (ref 5–25)
CALCIUM ALBUM COR SERPL-MCNC: 9.4 MG/DL (ref 8.3–10.1)
CALCIUM SERPL-MCNC: 8.3 MG/DL (ref 8.3–10.1)
CHLORIDE SERPL-SCNC: 104 MMOL/L (ref 96–108)
CO2 SERPL-SCNC: 30 MMOL/L (ref 21–32)
CREAT SERPL-MCNC: 0.33 MG/DL (ref 0.6–1.3)
GFR SERPL CREATININE-BSD FRML MDRD: 108 ML/MIN/1.73SQ M
GLUCOSE SERPL-MCNC: 86 MG/DL (ref 65–140)
P AXIS: 77 DEGREES
POTASSIUM SERPL-SCNC: 3.7 MMOL/L (ref 3.5–5.3)
PR INTERVAL: 136 MS
PROT SERPL-MCNC: 5.4 G/DL (ref 6.4–8.4)
QRS AXIS: 77 DEGREES
QRSD INTERVAL: 92 MS
QT INTERVAL: 366 MS
QTC INTERVAL: 403 MS
SODIUM SERPL-SCNC: 137 MMOL/L (ref 135–147)
T WAVE AXIS: -29 DEGREES
VENTRICULAR RATE: 73 BPM

## 2023-02-13 RX ORDER — CITALOPRAM 10 MG/1
10 TABLET ORAL DAILY
COMMUNITY

## 2023-02-13 RX ORDER — ALPRAZOLAM 0.5 MG/1
1 TABLET ORAL DAILY
Status: DISCONTINUED | OUTPATIENT
Start: 2023-02-13 | End: 2023-02-14 | Stop reason: HOSPADM

## 2023-02-13 RX ORDER — CITALOPRAM 10 MG/1
10 TABLET ORAL DAILY
Status: DISCONTINUED | OUTPATIENT
Start: 2023-02-13 | End: 2023-02-13

## 2023-02-13 RX ORDER — AMLODIPINE BESYLATE 5 MG/1
5 TABLET ORAL DAILY
COMMUNITY

## 2023-02-13 RX ORDER — ALPRAZOLAM 1 MG/1
1 TABLET ORAL 2 TIMES DAILY PRN
COMMUNITY

## 2023-02-13 RX ORDER — AMLODIPINE BESYLATE 5 MG/1
5 TABLET ORAL DAILY
Status: DISCONTINUED | OUTPATIENT
Start: 2023-02-13 | End: 2023-02-13

## 2023-02-13 RX ORDER — ALPRAZOLAM 0.5 MG/1
1 TABLET ORAL 2 TIMES DAILY PRN
Status: DISCONTINUED | OUTPATIENT
Start: 2023-02-13 | End: 2023-02-13

## 2023-02-13 RX ORDER — LANOLIN ALCOHOL/MO/W.PET/CERES
100 CREAM (GRAM) TOPICAL DAILY
Status: DISCONTINUED | OUTPATIENT
Start: 2023-02-13 | End: 2023-02-14 | Stop reason: HOSPADM

## 2023-02-13 RX ORDER — ALPRAZOLAM 0.5 MG/1
1 TABLET ORAL
Status: DISCONTINUED | OUTPATIENT
Start: 2023-02-13 | End: 2023-02-14 | Stop reason: HOSPADM

## 2023-02-13 RX ORDER — FOLIC ACID 1 MG/1
1 TABLET ORAL DAILY
Status: DISCONTINUED | OUTPATIENT
Start: 2023-02-13 | End: 2023-02-14 | Stop reason: HOSPADM

## 2023-02-13 RX ORDER — LEVOTHYROXINE SODIUM 0.05 MG/1
50 TABLET ORAL DAILY
Status: DISCONTINUED | OUTPATIENT
Start: 2023-02-13 | End: 2023-02-13

## 2023-02-13 RX ORDER — LEVOTHYROXINE SODIUM 0.05 MG/1
50 TABLET ORAL DAILY
COMMUNITY

## 2023-02-13 RX ADMIN — LEVETIRACETAM 500 MG: 500 TABLET, FILM COATED ORAL at 08:14

## 2023-02-13 RX ADMIN — ALPRAZOLAM 1 MG: 0.5 TABLET ORAL at 11:00

## 2023-02-13 RX ADMIN — AMLODIPINE BESYLATE 5 MG: 5 TABLET ORAL at 08:13

## 2023-02-13 RX ADMIN — LEVETIRACETAM 500 MG: 500 TABLET, FILM COATED ORAL at 17:09

## 2023-02-13 RX ADMIN — ACETAMINOPHEN 650 MG: 325 TABLET ORAL at 01:31

## 2023-02-13 RX ADMIN — THIAMINE HCL TAB 100 MG 100 MG: 100 TAB at 11:00

## 2023-02-13 RX ADMIN — LEVOTHYROXINE SODIUM 50 MCG: 50 TABLET ORAL at 05:04

## 2023-02-13 RX ADMIN — Medication 1 TABLET: at 11:00

## 2023-02-13 RX ADMIN — CITALOPRAM HYDROBROMIDE 10 MG: 10 TABLET ORAL at 08:13

## 2023-02-13 RX ADMIN — FOLIC ACID 1 MG: 1 TABLET ORAL at 11:00

## 2023-02-13 NOTE — PLAN OF CARE
Problem: PHYSICAL THERAPY ADULT  Goal: Performs mobility at highest level of function for planned discharge setting  See evaluation for individualized goals  Description: Treatment/Interventions: Functional transfer training, LE strengthening/ROM, Elevations, Therapeutic exercise, Endurance training, Patient/family training, Equipment eval/education, Bed mobility, Gait training, Spoke to nursing, OT  Equipment Recommended: Oliver Spears       See flowsheet documentation for full assessment, interventions and recommendations  Note: Prognosis: Good  Problem List: Decreased strength, Decreased mobility, Impaired balance, Decreased safety awareness, Pain  Assessment: Pt presented to [facility] s/p unwitnessed fall at home with LOC, sustaining a SDH and posterior cranial laceration  PMH includes HTN, anxiety, and hypothyroidism  Pt being seen for high complexity PT evaluation due to ongoing medical management for primary diagnosis, continuous pulse oximetry monitoring, increased reliance on AD, and decreased activity tolerance compared to baseline  Pt performs bed mobility with supervision, transfers with supervision, ambulation with RW with supervision, and stair negotiation with supervision by SPT  Pt amb 150 ft with RW and is noted with forward flexed posture, slow zay, and decreased stride length but is not limited by fatigue or pain  Pt presented at PT evaluation with decreased bilateral LE strength, static and dynamic balance, pain, and gait deviations, and will continue to benefit from PT services throughout hospital stay  At conclusion of PT evaluation, pt was seated in chair with all needs within reach and chair alarm engaged  Due to the presence of familial support and capabilities with functional mobility, PT d/c recommendation when medically cleared is home with increased familial support and home PT services to address the residual impairments listed above    Barriers to Discharge: None     PT Discharge Recommendation: Home with home health rehabilitation    See flowsheet documentation for full assessment

## 2023-02-13 NOTE — CONSULTS
[address] [facility] [place] 1945, 70 y o  female MRN: [id number]  Unit/Bed#: [facility] 714-05 Encounter: [id number]  Primary Care Provider: Isis Rodriges MD   Date and time admitted to hospital: 2/12/2023  7:21 PM    Inpatient consult to Neurosurgery  Consult performed by: Ivonne Puga PA-C  Consult ordered by: Darrell Gil MD      Imaging personally reviewed with attending 2/13/23 at 7:30am  Patient examined at bedside 2/13/23 at    * SDH (subdural hematoma)  Assessment & Plan  SDH/SAH of L frontal lobe   · Presents s/p fall with injury to head and R scalp laceration   · H/o daily cigarette smoking and alcohol abuse with occasional naproxen use for chronic neck/back pain     Imaging:    CT head wo 2/12/23:Small amount of subarachnoid hemorrhage in the left frontal lobe with an adjacent small focal subdural hematoma measuring 5 mm in thickness  CT head wo 2/13/23:Slight interval increase in size of the left frontal convexity subdural hematoma  Small volume regional subarachnoid hemorrhage is again noted with a suspected small associated contusion  Plan:  · Continue frequent neurological checks  · Reviewed imaging with patient   · STAT CT head with any neurological decline including drop GCS of 2pts within 1 hr   · Given slight worsening of left frontal convexity subdural hematoma will repeat CT head tomorrow morning  · Recommend SBP <160mmHg  · Seizure prophylaxis per trauma team  · Hold all antiplatelet and anticoagulation medications  · Medical management and pain control per primary team  · Mobilize with PT/OT  · DVT PPX: SCDs only at this time  We will repeat CT head tomorrow morning for stability  · No neurosurgical intervention indicated at this juncture  Neurosurgery will continue to follow once CT head completed, call with any further questions or concerns                Alcohol abuse  Assessment & Plan  Daily wine drinker  Management per primary team  Consider CIWA protocol     History of Present Illness     HPI: Alexandrea Ge is a 70y o  year old female with PMH including HTN, alcohol abuse, and hypothyroidism who presents s/p fall with positive head strike and LOC  She was exiting her car that she just drove home and fell backwards onto cement sustaining a injury to posterior head  Her  came out immediately to help her  She had significant bleeding from her posterior scalp, her and her  tried to control the bleeding but were unable to and called EMS to bring her into the hospital for further work-up and evaluation  Does not recall events  Patient denies any blood thinner use  Upon further questioning she does report taking naproxen at times for neck or back pain and does believe she took some yesterday  Upon presenting to ED she had intense head pain with severe bleeding from scalp laceration  Today she reports very mild head pain with significant improvement from yesterday and no bleeding from scalp laceration following primary closure last night  She denies any other injuries to the body, headaches, dizziness, lightheadness, vision changes or problems walking/moving around in bed, chest pain, shortness of breath, abdominal pain, nausea, vomiting, diarrhea, no new problems with bowel or bladder, no new weakness or numbness/tingling  She drinks alcohol daily and is a daily cigarette smoker  Patient does endorse chronic neck problems with ongoing left upper extremity weakness  She also endorses some urinary urgency  Review of Systems   Constitutional: Positive for activity change  Negative for chills and fever  HENT: Negative for tinnitus and trouble swallowing  Eyes: Negative for visual disturbance  Respiratory: Negative for shortness of breath  Cardiovascular: Negative for chest pain  Gastrointestinal: Negative for abdominal pain, constipation, diarrhea, nausea and vomiting     Genitourinary: Negative for difficulty urinating  Urinary urgency   Musculoskeletal: Negative for back pain and neck pain  Skin: Positive for wound (Posterior scalp lack repaired by trauma)  Neurological: Positive for weakness  Negative for dizziness, speech difficulty, light-headedness, numbness and headaches  Mild head pain       Historical Information   History reviewed  No pertinent past medical history  History reviewed  No pertinent surgical history  Social History     Substance and Sexual Activity   Alcohol Use Yes   • Alcohol/week: 21 0 standard drinks   • Types: 21 Glasses of wine per week    Comment: no more than 3 glasses of wine per day     Social History     Substance and Sexual Activity   Drug Use Not on file     Social History     Tobacco Use   Smoking Status Every Day   • Packs/day: 1 00   • Years: 35 00   • Pack years: 35 00   • Types: Cigarettes   Smokeless Tobacco Never     History reviewed  No pertinent family history      Meds/Allergies   all current active meds have been reviewed, current meds:   Current Facility-Administered Medications   Medication Dose Route Frequency   • acetaminophen (TYLENOL) tablet 650 mg  650 mg Oral Q6H PRN   • ALPRAZolam (XANAX) tablet 1 mg  1 mg Oral HS PRN   • ALPRAZolam (XANAX) tablet 1 mg  1 mg Oral Daily   • amLODIPine (NORVASC) tablet 5 mg  5 mg Oral Daily   • citalopram (CeleXA) tablet 10 mg  10 mg Oral Daily   • docusate sodium (COLACE) capsule 100 mg  100 mg Oral BID   • folic acid (FOLVITE) tablet 1 mg  1 mg Oral Daily   • levETIRAcetam (KEPPRA) tablet 500 mg  500 mg Oral BID   • levothyroxine tablet 50 mcg  50 mcg Oral Early Morning   • multivitamin-minerals (CENTRUM) tablet 1 tablet  1 tablet Oral Daily   • ondansetron (ZOFRAN) injection 4 mg  4 mg Intravenous Q6H PRN   • senna (SENOKOT) tablet 17 2 mg  2 tablet Oral Daily   • thiamine tablet 100 mg  100 mg Oral Daily    and PTA meds:   Prior to Admission Medications   Prescriptions Last Dose Informant Patient Reported? Taking? ALPRAZolam (XANAX) 1 mg tablet   Yes Yes   Sig: Take 1 mg by mouth 2 (two) times a day as needed for anxiety   amLODIPine (NORVASC) 5 mg tablet   Yes Yes   Sig: Take 5 mg by mouth daily   citalopram (CeleXA) 10 mg tablet   Yes Yes   Sig: Take 10 mg by mouth daily   levothyroxine 50 mcg tablet   Yes Yes   Sig: Take 50 mcg by mouth daily      Facility-Administered Medications: None     No Known Allergies    Objective   I/O       02/11 0701 02/12 0700 02/12 0701 02/13 0700 02/13 0701 02/14 0700    I V  (mL/kg)  500 (11 5)     Total Intake(mL/kg)  500 (11 5)     Net  +500                  Physical Exam  Vitals reviewed  Constitutional:       General: She is awake  She is not in acute distress  Appearance: Normal appearance  She is not ill-appearing  Comments: Ax3 but states wrong year   HENT:      Head: Normocephalic  Comments: 5 cm laceration with non interrupted sutures of R parietal scalp obscured by dried blood, significant dried blood on central parietal scalp  Eyes:      Extraocular Movements: Extraocular movements intact and EOM normal       Conjunctiva/sclera: Conjunctivae normal       Pupils: Pupils are equal, round, and reactive to light  Cardiovascular:      Rate and Rhythm: Normal rate  Pulmonary:      Effort: Pulmonary effort is normal  No respiratory distress  Chest:      Chest wall: No tenderness  Abdominal:      General: There is no distension  Palpations: Abdomen is soft  Tenderness: There is no abdominal tenderness  Musculoskeletal:         General: Normal range of motion  Cervical back: Normal range of motion and neck supple  Comments: Ecchymosis of R lateral wrist   Skin:     General: Skin is warm and dry  Neurological:      Mental Status: She is alert and oriented to person, place, and time  Deep Tendon Reflexes:      Reflex Scores:       Bicep reflexes are 2+ on the right side and 2+ on the left side         Patellar reflexes are 3+ on the right side and 3+ on the left side  Psychiatric:         Attention and Perception: Attention and perception normal          Mood and Affect: Mood and affect normal          Speech: Speech normal          Behavior: Behavior normal  Behavior is cooperative  Thought Content: Thought content normal          Cognition and Memory: Cognition and memory normal          Judgment: Judgment normal        Neurologic Exam     Mental Status   Oriented to person, place, and time  Attention: normal    Speech: speech is normal     Cranial Nerves     CN III, IV, VI   Pupils are equal, round, and reactive to light  Extraocular motions are normal      CN V   Facial sensation intact  CN VII   Facial expression full, symmetric  CN VIII   Hearing: intact    CN XI   Right trapezius strength: normal  Left trapezius strength: normal    CN XII   CN XII normal      Motor Exam   Right arm pronator drift: absent  Left arm pronator drift: absent    Strength   Strength 5/5 except as noted  Except L bicep/tricep 4/5     Sensory Exam   Light touch normal    Proprioception normal    JPS and DST intact     Gait, Coordination, and Reflexes     Reflexes   Right biceps: 2+  Left biceps: 2+  Right patellar: 3+  Left patellar: 3+  Right Dias: absent  Left Dias: absent  Right ankle clonus: absent  Left pendular knee jerk: absent      Vitals:Blood pressure 113/72, pulse 64, temperature 98 °F (36 7 °C), resp  rate 16, height 5' 2" (1 575 m), weight 43 4 kg (95 lb 10 9 oz), SpO2 97 %  ,Body mass index is 17 5 kg/m²       Lab Results:   Results from last 7 days   Lab Units 02/12/23 2056 02/12/23 2018 02/12/23  1934   WBC Thousand/uL  --  15 79*  --    HEMOGLOBIN g/dL 14 0 13 9  --    I STAT HEMOGLOBIN g/dl  --   --  15 6*   HEMATOCRIT % 41 0 41 1  --    HEMATOCRIT, ISTAT %  --   --  46   PLATELETS Thousands/uL  --  248  --    NEUTROS PCT %  --  81*  --    MONOS PCT %  --  6  --      Results from last 7 days   Lab Units 02/13/23  0641 02/12/23  1934 02/12/23 1932   POTASSIUM mmol/L 3 7  --  3 8   CHLORIDE mmol/L 104  --  104   CO2 mmol/L 30  --  30   CO2, I-STAT mmol/L  --  32  --    BUN mg/dL 9  --  7   CREATININE mg/dL 0 33*  --  0 47*   CALCIUM mg/dL 8 3  --  8 8   ALK PHOS U/L 89  --   --    ALT U/L 10*  --   --    AST U/L 16  --   --    GLUCOSE, ISTAT mg/dl  --  115  --                  No results found for: TROPONINT  ABG:No results found for: PHART, KVY1DDN, PO2ART, QGO8DMZ, U4BRXIPI, BEART, SOURCE    Imaging Studies: I have personally reviewed pertinent films in PACS    EKG, Pathology, and Other Studies: I have personally reviewed pertinent films in PACS    VTE Prophylaxis: Sequential compression device (Venodyne)     Code Status: Level 1 - Full Code  Advance Directive and Living Will:      Power of :    POLST:      Counseling / Coordination of Care  I spent 30 minutes with the patient

## 2023-02-13 NOTE — UTILIZATION REVIEW
Initial Clinical Review    Admission: Date/Time/Statement:   Admission Orders (From admission, onward)     Ordered        02/12/23 2038  Inpatient Admission  Once                      Orders Placed This Encounter   Procedures   • Inpatient Admission     Standing Status:   Standing     Number of Occurrences:   1     Order Specific Question:   Level of Care     Answer:   Level 2 Stepdown / HOT [14]     Order Specific Question:   Estimated length of stay     Answer:   More than 2 Midnights     Order Specific Question:   Certification     Answer:   I certify that inpatient services are medically necessary for this patient for a duration of greater than two midnights  See H&P and MD Progress Notes for additional information about the patient's course of treatment  ED Arrival Information     Expected   -    Arrival   2/12/2023 19:21    Acuity   Immediate            Means of arrival   Ambulance    Escorted by   "[employer]    Service   Trauma    Admission type   [address] complaint   Trauma           Chief Complaint   Patient presents with   • Fall       Initial Presentation: 70 y o  female to ED presents for unwitnessed Fall with posterior head pain approx 1 hr before coming into the hospital  +LOC, but is unsure if it was before or after the fall  C/o pain isolated to the back of her head, has a significant about of blood  Not on bld thinners  PMH for HTN, Hypothyroidism  Anxiety  Admit Inpatient level of care for Fall with SAH and Scalp laceration  High Observation level  Neurosurgery consult  Keppra x 7 days  Neuro checks  Repeat CT Head in am 2/13  Repaired with 2-0 nylon bedside    Date: 2/13  Day 2:   Neurosurgery cons; SDH  SDH/SAH of L frontal lobe  R scalp laceration  Continue frequent neuro checks  Given slight worsening of left frontal convexity subdural hematoma will repeat CT head tomorrow morning   CT head wo 2/12/23:Small amount of subarachnoid hemorrhage in the left frontal lobe with an adjacent small focal subdural hematoma measuring 5 mm in thickness  Recommend SBP <160 mmHg  Hold all antiplatelet and anticoagulation meds  No neurosurgical intervention indicated at this juncture  H/o daily cigarette smoking and alcohol abuse with occasional naproxen use for chronic neck/back pain  Progress notes; Repeat CT head showed slight increase in size of SDH  Not cleared for chemical DVT ppx Per Neurosurgery  Decrease neuro checks to q4h  GCS 15  Tylenol for headache  Drinks 3 glasses of wine per day  Has a mild   tremor which she states is chronic    CIWA score = 8      ED Triage Vitals   Temperature Pulse Respirations Blood Pressure SpO2   02/12/23 1927 02/12/23 1924 02/12/23 1924 02/12/23 1924 02/12/23 1924   97 6 °F (36 4 °C) 79 20 130/72 91 %      Temp Source Heart Rate Source Patient Position - Orthostatic VS BP Location FiO2 (%)   02/12/23 1927 02/12/23 1924 02/13/23 0005 -- --   Tympanic Monitor Lying        Pain Score       02/13/23 0130       6          Wt Readings from Last 1 Encounters:   02/13/23 43 4 kg (95 lb 10 9 oz)     Additional Vital Signs:   02/13/23 11:06:08 98 °F (36 7 °C) -- 16 113/72 86 -- -- -- -- --   02/13/23 1030 -- -- -- -- -- -- -- -- None (Room air) --   02/13/23 0953 -- -- -- -- -- 97 % -- -- None (Room air) --   02/13/23 0813 -- 64 -- -- -- -- -- -- -- --   02/13/23 06:42:25 97 9 °F (36 6 °C) 70 18 114/66 82 98 % -- -- -- --   02/13/23 0500 -- -- -- -- -- -- 28 2 L/min Nasal cannula --   02/13/23 04:55:50 -- 66 -- 107/62 77 94 % -- -- --      02/13/23 0005 -- 76 20 110/64 -- 99 % -- -- Nasal cannula  Lying   O2 Device: 2L at 02/13/23 0005 02/12/23 2302 -- 78 20 112/53 -- 95 % -- -- Nasal cannula --   02/12/23 2030 -- 74 18 83/51   Abnormal   -- 98 % -- -- Nasal cannula --   BP: RESIDENT PAGED FOR BP <90 X2 at 02/12/23 2030 02/12/23 2015 -- 76 20 89/54   Abnormal  -- 100 % -- -- None (Room air) --   02/12/23 2000 -- 72 20 94/53 -- 100 % -- -- --      Pertinent Labs/Diagnostic Test Results:   CT head wo contrast   Final Result by Willow Maynard MD (02/13 1234)      Slight interval increase in size of the left frontal convexity subdural hematoma  Small volume regional subarachnoid hemorrhage is again noted with a suspected small associated contusion  The study was marked in [facility] for immediate notification  Workstation performed: [id number]         XR Trauma multiple (SLB/SLRA trauma bay ONLY)   Final Result by Anthony Parikh MD (02/12 2007)      No acute pulmonary pathology  Workstation performed: [id number]         XR chest 1 view   Final Result by Anthony Parikh MD (02/13 1047)      No acute pulmonary pathology  Workstation performed: [id number]         TRAUMA - CT head wo contrast   Final Result by Anthony Parikh MD (02/12 2013)      Small amount of subarachnoid hemorrhage in the left frontal lobe with an adjacent small focal subdural hematoma measuring 5 mm in thickness  No mass effect or midline shift  Scalp hematoma and laceration and right occipital region, however no evidence of calvarial fracture  I personally discussed this study with GLENIS CONNOLLY on 2/12/2023 at 8:12 PM                    Workstation performed: [id number]         TRAUMA - CT spine cervical wo contrast   Final Result by Anthony Parikh MD (02/12 2013)      No cervical spine fracture or traumatic malalignment  Moderate multilevel degenerative changes        I personally discussed this study with GLENIS CONNOLLY on 2/12/2023 at 8:12 PM       Workstation performed: [id number]               Results from last 7 days   Lab Units 02/12/23 2056 02/12/23 2018 02/12/23  1934   WBC Thousand/uL  --  15 79*  --    HEMOGLOBIN g/dL 14 0 13 9  --    I STAT HEMOGLOBIN g/dl  --   --  15 6*   HEMATOCRIT % 41 0 41 1  --    HEMATOCRIT, ISTAT %  --   --  46   PLATELETS Thousands/uL  --  248  --    NEUTROS ABS Thousands/µL  --  12 83*  -- Results from last 7 days   Lab Units 02/13/23  0641 02/12/23 1934 02/12/23 1932   SODIUM mmol/L 137  --  138   POTASSIUM mmol/L 3 7  --  3 8   CHLORIDE mmol/L 104  --  104   CO2 mmol/L 30  --  30   CO2, I-STAT mmol/L  --  32  --    ANION GAP mmol/L 3*  --  4   BUN mg/dL 9  --  7   CREATININE mg/dL 0 33*  --  0 47*   EGFR ml/min/1 73sq m 108  --  96   CALCIUM mg/dL 8 3  --  8 8   CALCIUM, IONIZED, ISTAT mmol/L  --  1 14  --      Results from last 7 days   Lab Units 02/13/23  0641   AST U/L 16   ALT U/L 10*   ALK PHOS U/L 89   TOTAL PROTEIN g/dL 5 4*   ALBUMIN g/dL 2 6*   TOTAL BILIRUBIN mg/dL 1 08*         Results from last 7 days   Lab Units 02/13/23  0641 02/12/23 1932   GLUCOSE RANDOM mg/dL 86 113       Results from last 7 days   Lab Units 02/12/23 1934   PH, DERRICK I-STAT  7 367   PCO2, DERRICK ISTAT mm HG 53 8*   PO2, DERRICK ISTAT mm HG 24 0*   HCO3, DERRICK ISTAT mmol/L 30 8*   I STAT BASE EXC mmol/L 4*   I STAT O2 SAT % 38*     Results from last 7 days   Lab Units 02/12/23  1932   CK TOTAL U/L 57     Results from last 7 days   Lab Units 02/12/23 1932   HS TNI 0HR ng/L <2       ED Treatment:   Medication Administration from 02/12/2023 1919 to 02/13/2023 0447       Date/Time Order Dose Route Action     02/12/2023 1928 EST tetanus-diphtheria-acellular pertussis (BOOSTRIX) IM injection 0 5 mL 0 5 mL Intramuscular Given     02/12/2023 2015 EST lidocaine-epinephrine (XYLOCAINE/EPINEPHRINE) 2 %-1:100,000 injection 20 mL 20 mL Infiltration Given     02/12/2023 2201 EST levETIRAcetam (KEPPRA) tablet 500 mg 500 mg Oral Given     02/13/2023 0131 EST acetaminophen (TYLENOL) tablet 650 mg 650 mg Oral Given     02/12/2023 2146 EST lactated ringers infusion 500 mL 500 mL Intravenous New Bag        History reviewed  No pertinent past medical history  Present on Admission:  **None**      Admitting Diagnosis: SDH (subdural hematoma) [S06  5XAA]  Unspecified multiple injuries, initial encounter [T07  XXXA]  Age/Sex: 70 y o  female Admission Orders:  Scheduled Medications:  ALPRAZolam, 1 mg, Oral, Daily  amLODIPine, 5 mg, Oral, Daily  citalopram, 10 mg, Oral, Daily  docusate sodium, 100 mg, Oral, BID  folic acid, 1 mg, Oral, Daily  levETIRAcetam, 500 mg, Oral, BID  levothyroxine, 50 mcg, Oral, Early Morning  multivitamin-minerals, 1 tablet, Oral, Daily  senna, 2 tablet, Oral, Daily  thiamine, 100 mg, Oral, Daily      Continuous IV Infusions:     PRN Meds:  acetaminophen, 650 mg, Oral, Q6H PRN  ALPRAZolam, 1 mg, Oral, HS PRN  ondansetron, 4 mg, Intravenous, Q6H PRN      Tele monitoring  Neuro checks  q1h per HOT protocol  IP CONSULT TO NEUROSURGERY  IP CONSULT TO CASE MANAGEMENT    Network Utilization Review Department  ATTENTION: Please call with any questions or concerns to [phone] and carefully listen to the prompts so that you are directed to the right person  All voicemails are confidential   Napoleon Cole all requests for admission clinical reviews, approved or denied determinations and any other requests to dedicated fax number below belonging to the campus where the patient is receiving treatment   List of dedicated fax numbers for the Facilities:  [address] DENIALS (Administrative/Medical Necessity) [phone]   [address] (Maternity/NICU/Pediatrics) [phone]   919 Roya Gaitan [phone]   [facility] 77 [phone]   [address] Vick [facility] 28 [phone]   [facility] Lorenzo [facility] 134 [address] [facility] [phone]

## 2023-02-13 NOTE — PLAN OF CARE
Problem: PAIN - ADULT  Goal: Verbalizes/displays adequate comfort level or baseline comfort level  Description: Interventions:  - Encourage patient to monitor pain and request assistance  - Assess pain using appropriate pain scale  - Administer analgesics based on type and severity of pain and evaluate response  - Implement non-pharmacological measures as appropriate and evaluate response  - Consider cultural and social influences on pain and pain management  - Notify physician/advanced practitioner if interventions unsuccessful or patient reports new pain  Outcome: Progressing     Problem: INFECTION - ADULT  Goal: Absence or prevention of progression during hospitalization  Description: INTERVENTIONS:  - Assess and monitor for signs and symptoms of infection  - Monitor lab/diagnostic results  - Monitor all insertion sites, i e  indwelling lines, tubes, and drains  - Monitor endotracheal if appropriate and nasal secretions for changes in amount and color  - [place] appropriate cooling/warming therapies per order  - Administer medications as ordered  - Instruct and encourage patient and family to use good hand hygiene technique  - Identify and instruct in appropriate isolation precautions for identified infection/condition  Outcome: Progressing  Goal: Absence of fever/infection during neutropenic period  Description: INTERVENTIONS:  - Monitor WBC    Outcome: Progressing     Problem: SAFETY ADULT  Goal: Patient will remain free of falls  Description: INTERVENTIONS:  - Educate patient/family on patient safety including physical limitations  - Instruct patient to call for assistance with activity   - Consult OT/PT to assist with strengthening/mobility   - Keep Call bell within reach  - Keep bed low and locked with side rails adjusted as appropriate  - Keep care items and personal belongings within reach  - Initiate and maintain comfort rounds  - Make Fall Risk Sign visible to staff  - Offer Toileting   - Obtain necessary fall risk management equipment  - Apply yellow socks and bracelet for high fall risk patients  - Consider moving patient to room near nurses station  Outcome: Progressing  Goal: Maintain or return to baseline ADL function  Description: INTERVENTIONS:  -  Assess patient's ability to carry out ADLs; assess patient's baseline for ADL function and identify physical deficits which impact ability to perform ADLs (bathing, care of mouth/teeth, toileting, grooming, dressing, etc )  - Assess/evaluate cause of self-care deficits   - Assess range of motion  - Assess patient's mobility; develop plan if impaired  - Assess patient's need for assistive devices and provide as appropriate  - Encourage maximum independence but intervene and supervise when necessary  - Involve family in performance of ADLs  - Assess for home care needs following discharge   - Consider OT consult to assist with ADL evaluation and planning for discharge  - Provide patient education as appropriate  Outcome: Progressing  Goal: Maintains/Returns to pre admission functional level  Description: INTERVENTIONS:  - Perform BMAT or MOVE assessment daily    - Set and communicate daily mobility goal to care team and patient/family/caregiver     - Collaborate with rehabilitation services on mobility goals if consulted  - Out of bed for toileting  - Record patient progress and toleration of activity level   Outcome: Progressing     Problem: DISCHARGE PLANNING  Goal: Discharge to home or other facility with appropriate resources  Description: INTERVENTIONS:  - Identify barriers to discharge w/patient and caregiver  - Arrange for needed discharge resources and transportation as appropriate  - Identify discharge learning needs (meds, wound care, etc )  - Arrange for interpretive services to assist at discharge as needed  - Refer to Case Management Department for coordinating discharge planning if the patient needs post-hospital services based on physician/advanced practitioner order or complex needs related to functional status, cognitive ability, or social support system  Outcome: Progressing     Problem: Knowledge Deficit  Goal: Patient/family/caregiver demonstrates understanding of disease process, treatment plan, medications, and discharge instructions  Description: Complete learning assessment and assess knowledge base    Interventions:  - Provide teaching at level of understanding  - Provide teaching via preferred learning methods  Outcome: Progressing

## 2023-02-13 NOTE — ASSESSMENT & PLAN NOTE
SDH/SAH of L frontal lobe   · Presents s/p fall with injury to head and R scalp laceration   · H/o daily cigarette smoking and alcohol abuse with occasional naproxen use for chronic neck/back pain     Imaging:    CT head wo 2/12/23:Small amount of subarachnoid hemorrhage in the left frontal lobe with an adjacent small focal subdural hematoma measuring 5 mm in thickness  CT head wo 2/13/23:Slight interval increase in size of the left frontal convexity subdural hematoma  Small volume regional subarachnoid hemorrhage is again noted with a suspected small associated contusion  Plan:  · Continue frequent neurological checks  · Reviewed imaging with patient   · STAT CT head with any neurological decline including drop GCS of 2pts within 1 hr   · Given slight worsening of left frontal convexity subdural hematoma will repeat CT head tomorrow morning  · Recommend SBP <160mmHg  · Seizure prophylaxis per trauma team  · Hold all antiplatelet and anticoagulation medications  · Medical management and pain control per primary team  · Mobilize with PT/OT  · DVT PPX: SCDs only at this time  We will repeat CT head tomorrow morning for stability  · No neurosurgical intervention indicated at this juncture  Neurosurgery will continue to follow once CT head completed, call with any further questions or concerns

## 2023-02-13 NOTE — H&P
[facility]  H&P- Dagoberto Fuentes 1945, 70 y o  female MRN: [id number]  Unit/Bed#: ED 15 Encounter: [id number]  Primary Care Provider: Katey Macias MD   Date and time admitted to hospital: 2023  7:21 PM    Scalp laceration  Assessment & Plan  Repaired with 2-0 nylon bedside    Anxiety  Assessment & Plan  Continue home celexa    Hypothyroidism  Assessment & Plan  Continue home levothyroxine     HTN (hypertension)  Assessment & Plan  Continue home amlodipine    SDH (subdural hematoma)  Assessment & Plan  Small SDH  Consult NSGY  Admit to trauma SD2HOT  Neuro Checks  Keppra x7 days  Repeat CTH in AM      The patient had a CT scan of the cervical spine demonstrating no acute fractures or traumatic malalignment  On exam, the patient had no midline cervical spine tenderness  The patient had full range of motion (was then able to flex, extend, and rotate head side to side) without pain  There were no distracting injuries and the patient was not intoxicated  The patients cervical collar was cleared radiologically and clinically  Claribel Shahid MD  2023  9:35 PM      Trauma Alert: Level B   Model of Arrival: Ambulance    Trauma Team: Attending Eugene Heath and Residents Darian [facility]ston [facility]  Consultants:     Neurosurgery: routine consult; Epic consult order placed; History of Present Illness     Chief Complaint: head pain  Mechanism:Fall     HPI:    Dagoberto Fuentes is a 70 y o  female who presents with posterior head pain after a fall at home that was unwitnessed  She was down for about an hour before coming in to the hospital  She lost consciousness but is unsure if it was before or after the fall  She has pain isolated to the back of her head and has a significant about of blood  She is not on blood thinners  PMH of HTN, Hypothyroidism  Review of Systems   All other systems reviewed and are negative      12-point, complete review of systems was reviewed and negative except as stated above  Historical Information     No past medical history on file  No past surgical history on file  Unable to obtain history due to none       Immunization History   Administered Date(s) Administered   • Tdap 02/12/2023     Last Tetanus: unknown (updated today)  Family History: Non-contributory    1  Before the illness or injury that brought you to the Emergency, did you need someone to help you on a regular basis? 0=No   2  Since the illness or injury that brought you to the Emergency, have you needed more help than usual to take care of yourself? 0=No   3  Have you been hospitalized for one or more nights during the past 6 months (excluding a stay in the Emergency Department)? 0=No   4  In general, do you see well? 0=Yes   5  In general, do you have serious problems with your memory? 0=No   6  Do you take more than three different medications everyday? 1=Yes   TOTAL   1     Did you order a geriatric consult if the score was 2 or greater?: no     Meds/Allergies   all current active meds have been reviewed  Allergies have not been reviewed;   Not on File    Objective   Initial Vitals:   Temperature: 97 6 °F (36 4 °C) (02/12/23 1927)  Pulse: 79 (02/12/23 1924)  Respirations: 20 (02/12/23 1924)  Blood Pressure: 130/72 (02/12/23 1924)    Primary Survey:   Airway:        Status: patent;        Pre-hospital Interventions: none        Hospital Interventions: none  Breathing:        Pre-hospital Interventions: none       Effort: normal       Right breath sounds: normal       Left breath sounds: normal  Circulation:        Rhythm: regular       Rate: regular   Right Pulses Left Pulses    R radial: 2+    R pedal: 2+     L radial: 2+    L pedal: 2+       Disability:        GCS: Eye: 4; Verbal: 5 Motor: 6 Total: 15       Right Pupil:       Left Pupil:     R Motor Strength L Motor Strength    R : 5/5  R dorsiflex: 5/5  R plantarflex: 5/5 L : 5/5  L dorsiflex: 5/5  L plantarflex: 5/5        Sensory:  No sensory deficit  Exposure:       Completed: Yes      Secondary Survey:  Physical Exam  Constitutional:       Appearance: Normal appearance  HENT:      Head: Normocephalic  Comments: Posterior scalp lac     Right Ear: External ear normal       Left Ear: External ear normal       Nose: Nose normal    Eyes:      Extraocular Movements: Extraocular movements intact  Cardiovascular:      Rate and Rhythm: Normal rate and regular rhythm  Pulses: Normal pulses  Heart sounds: Normal heart sounds  Pulmonary:      Effort: Pulmonary effort is normal       Breath sounds: Normal breath sounds  Abdominal:      General: There is no distension  Tenderness: There is no abdominal tenderness  Musculoskeletal:         General: No tenderness or deformity  Cervical back: No rigidity or tenderness  Skin:     General: Skin is warm and dry  Neurological:      General: No focal deficit present  Mental Status: She is alert  Mental status is at baseline  Motor: No weakness  Invasive Devices     Peripheral Intravenous Line  Duration           Peripheral IV 02/12/23 Left Forearm <1 day    Peripheral IV 02/12/23 Right Forearm <1 day              Lab Results: Results: I have personally reviewed all pertinent laboratory/tests results    Imaging Results: I have personally reviewed pertinent reports      Chest Xray(s): negative for acute findings   FAST exam(s): negative for acute findings   CT Scan(s): positive for acute findings: SDH   Additional Xray(s): N/A     Other Studies:     Code Status: Level 1 - Full Code  Advance Directive and Living Will:      Power of :    POLST:    I have spent 15 minutes with Patient  today in which greater than 50% of this time was spent in counseling/coordination of care regarding Diagnostic results, Prognosis, Risks and benefits of tx options, Intructions for management, Patient and family education, Importance of tx compliance, Risk factor reductions and Impressions

## 2023-02-13 NOTE — OCCUPATIONAL THERAPY NOTE
Occupational Therapy Evaluation     Patient Name: Ivonne Cole Date: 2/13/2023  Problem List  Principal Problem:    SDH (subdural hematoma)  Active Problems:    HTN (hypertension)    Hypothyroidism    Anxiety    Scalp laceration    Alcohol abuse    Fall    Past Medical History  History reviewed  No pertinent past medical history  Past Surgical History  History reviewed  No pertinent surgical history  02/13/23 0830   OT Last Visit   OT Visit Date 02/13/23   Note Type   Note type Evaluation   Pain Assessment   Pain Assessment Tool 0-10   Pain Score No Pain   Restrictions/Precautions   Weight Bearing Precautions Per Order No   Other Precautions Fall Risk;Pain; Chair Alarm; Bed Alarm   Home Living   Type of [address] [place] [address] One level;Stairs to enter with rails; Performs ADLs on one level  (4 phyllis)   Bathroom Shower/Tub Tub/shower unit   Bathroom Toilet Standard   Bathroom Equipment   (denies)   [address] [facility]  (not using)   Prior Function   Level of [place] Independent with functional mobility; Independent with ADLs; Independent with IADLS   Lives With Spouse   Receives Help From Family   IADLs Independent with meal prep; Independent with driving; Independent with medication management   Falls in the last 6 months 1 to 4  (1)   Vocational Retired   Lifestyle   Autonomy pta, pt was I w ADL/IADL, no AD mobility, +    Reciprocal Relationships lives with spouse, who pt reports is not in the best of health, 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   Does report local son/dtr who are able to assist    Service to Others retired   Intrinsic Gratification working out   Subjective   Subjective "I'm not sure what happened, but I feel ok"   ADL   Where Assessed Edge of bed   Eating Assistance 6  Modified independent   [address] 5  Supervision/Setup   LB Bathing Assistance 4  Minimal Assistance   UB Dressing Assistance 5  Supervision/Setup   LB Dressing Assistance 4  Minimal Assistance   Toileting Assistance  4  Minimal Assistance   Functional Assistance 4  Minimal Assistance   Bed Mobility   Rolling L 5  Supervision   Supine to Sit 5  Supervision   Transfers   Sit to Stand 5  Supervision   Additional items Increased time required   Stand to Sit 5  Supervision   Additional items Increased time required   Additional Comments c rw, vc for hand placement and inc overall safety awareness  Functional Mobility   Functional Mobility 5  Supervision   Additional Comments household distance   Additional items Rolling walker   Balance   Static Sitting Good   Dynamic Sitting Fair +   Static Standing Fair   Dynamic Standing Fair -   Ambulatory Fair -   Activity Tolerance   Activity Tolerance Patient tolerated treatment well   Medical Staff Made Aware DPT Damian Nunez, SPT [facility] 2' pts med complexity, comorbidities and regression from baseline   Nurse Made Aware ok per RN   RUE Assessment   RUE Assessment WFL   LUE Assessment   LUE Assessment WFL   Hand Function   Gross Motor Coordination Functional   Fine Motor Coordination Functional   Psychosocial   Psychosocial (WDL) WDL   Cognition   Overall Cognitive Status Impaired   Arousal/Participation Alert; Responsive   Attention Attends with cues to redirect   Orientation Level Oriented X4   Memory Decreased recall of precautions   Following Commands Follows one step commands without difficulty   Comments overall dec safety awareness and insight to condition  Poor insight to condition   Cognition Assessment Tools MOCA   Score 17   Assessment   Prognosis Good   Assessment Pt is a 70 y o  female admitted 2/12/23 s/p fall, was found to have SDH  Pt w active OT eval and treat orders at this time  PMH includes  has no past medical history on file  Pt lives w spouse in a [facility] with tub shower, standard toilet   Pta, pt was independent w/ ADL/IADL and functional mobility, was driving and was not using any DME at baseline  Currently, pt is S for UB ADL, Min A for LB ADL And completed transfers/FM w S w RW  From OT standpoint, pt is functioning at baseline level and does not appear to require additional acute OT at this time  Discussed pt's comfort with d/c from OT and any concerns with returning to previous environment; pt does not report any at this time  The patient's raw score on the AM-PAC Daily Activity inpatient short form is 23, standardized score is 51 12, greater than 39 4  Patients at this level are likely to benefit from discharge to home  Please refer to the recommendation of the Occupational Therapist for safe discharge planning  From OT perspective, pt should D/C HOME w home OT when medically stable  Acute OT no longer rq at this time, D/C OT  Goals   Patient Goals go home   Recommendation   OT Discharge Recommendation Home with home health rehabilitation   AM-[facility] Daily Activity Inpatient   Lower Body Dressing 3   Bathing 4   Toileting 4   Upper Body Dressing 4   Grooming 4   Eating 4   Daily Activity Raw Score 23   Daily Activity Standardized Score (Calc for Raw Score >=11) 51 12   AM-[facility] Applied Cognition Inpatient   Following a Speech/Presentation 4   Understanding Ordinary Conversation 4   Taking Medications 4   Remembering Where Things Are Placed or Put Away 4   Remembering List of 4-5 Errands 3   Taking Care of Complicated Tasks 3   Applied Cognition Raw Score 22   Applied Cognition Standardized Score 47 83   MOCA   Version 8 1   Visuopatial/Executive 1   Naming 3   Memory 0   Attention: Digits 2   Attention: Letters 1   Attention: Serial 2   Language: Repeat 2   Language: Fluency 0   Abstraction 1   Delayed Recall 0   Orientation 4   Does patient have less than or equal to 12 years of education?  1   MOCA Total Score 17   Modified Kane Scale   Modified Kane Scale 3   Additional Treatment Session   Start Time 1350   End Time 1406   Treatment Assessment follow up moca assessment, pt scored 17/30, indicating mod cog impairment   End of Consult   Education Provided Yes   Patient Position at End of Consult Bedside chair;Bed/Chair alarm activated; All needs within reach   Nurse Communication Nurse aware of consult         ELROY Trujillo, OTR/L

## 2023-02-13 NOTE — PHYSICAL THERAPY NOTE
PHYSICAL THERAPY EVALUATION          Patient Name: Ivonne Cole Date: 2/13/2023 02/13/23 0829   PT Last Visit   PT Visit Date 02/13/23   Note Type   Note type Evaluation   Pain Assessment   Pain Assessment Tool 0-10   Pain Score No Pain   Pain Location/Orientation Location: Head  (posterior occipital region, states "it's just sore")   Pain Onset/Description Descriptor: Sore   Patient's Stated Pain Goal No pain   Restrictions/Precautions   Weight Bearing Precautions Per Order No   Other Precautions Chair Alarm; Bed Alarm;O2;Fall Risk   Home Living   Type of [facility] One level;Stairs to enter with rails  (4 stairs to enter)   Bathroom Shower/Tub Tub only   Bathroom Toilet Standard   Home Equipment Walker;Cane   Additional Comments Pt reports living with , but is unable to provide support  Pt reports having local son who is able to help as needed  Prior Function   Level of [place] Independent with ADLs; Independent with functional mobility; Independent with IADLS   Lives With Spouse   Receives Help From Family   IADLs Independent with driving; Independent with meal prep; Independent with medication management   Falls in the last 6 months 1 to 4   Vocational Retired   Comments Pt denies use of AD for mobility prior to admission   General   Family/Caregiver Present No   Cognition   Overall Cognitive Status WFL   Arousal/Participation Alert   Orientation Level Oriented X4   Memory Within functional limits   Following Commands Follows multistep commands with increased time or repetition   Subjective   Subjective "I don't know what happened, I just remember waking up on the floor"   RUE Assessment   RUE Assessment WFL   LUE Assessment   LUE Assessment WFL   Strength RLE   RLE Overall Strength 4+/5  (functionall assessed)   Strength LLE   LLE Overall Strength 4/5  (functionall assessed)   Bed Mobility Rolling L 5  Supervision   Supine to Sit 5  Supervision   Transfers   Sit to Stand 5  Supervision   Additional items Verbal cues; Increased time required   Stand to Sit 5  Supervision   Additional items Armrests; Increased time required;Verbal cues   Additional Comments VC for hand placement on RW for transfers   Ambulation/Elevation   Gait pattern Foward flexed; Short stride; Excessively slow   Gait Assistance 5  Supervision   Assistive Device Rolling walker   Distance 150   Stair Management Assistance 5  Supervision   Stair Management Technique Two rails; Alternating pattern   Number of Stairs 4   Balance   Static Sitting Fair +   Dynamic Sitting Fair   Static Standing Fair -   Dynamic Standing Fair -   Ambulatory Fair -   Endurance Deficit   Endurance Deficit No   Activity Tolerance   Activity Tolerance Patient tolerated treatment well   Medical Staff Made Aware Giovanna Singer OT, present due to medical presentation   Nurse Made Aware clearance per RN   Assessment   Prognosis Good   Problem List Decreased strength;Decreased mobility; Impaired balance;Decreased safety awareness;Pain   Assessment Pt presented to [facility] s/p unwitnessed fall at home with LOC, sustaining a SDH and posterior cranial laceration  PMH includes HTN, anxiety, and hypothyroidism  Pt being seen for high complexity PT evaluation due to ongoing medical management for primary diagnosis, continuous pulse oximetry monitoring, increased reliance on AD, and decreased activity tolerance compared to baseline  Pt performs bed mobility with supervision, transfers with supervision, ambulation with RW with supervision, and stair negotiation with supervision by SPT  Pt amb 150 ft with RW and is noted with forward flexed posture, slow zay, and decreased stride length but is not limited by fatigue or pain   Pt presented at PT evaluation with decreased bilateral LE strength, static and dynamic balance, pain, and gait deviations, and will continue to benefit from PT services throughout hospital stay  At conclusion of PT evaluation, pt was seated in chair with all needs within reach and chair alarm engaged  Due to the presence of familial support and capabilities with functional mobility, PT d/c recommendation when medically cleared is home with increased familial support and home PT services to address the residual impairments listed above  Barriers to Discharge None   Goals   Patient Goals To go home with no pain   STG Expiration Date 02/23/23   Short Term Goal #1 In 10 days pt will complete: 1) Bed mobility skills with Mod I to increase safety and independence as well as decrease caregiver burden  2) Functional transfers with Mod I to promote increased independence, safety, and QOL  3) Ambulate 300' using least restrictive AD with Mod I without LOB and stable vitals so that pt can negotiate previous living environment safely and promote independence with functional mobility and return to PLOF  4) Stair training up/ down 4 step/s using rail/s with Mod I so that pt can enter/negotiate previous living environment safely and decrease fall risk  5) Improve balance grades by 1/2 grade to increase safety with all mobility and decrease fall risk  6) Improve BLE strength by 1/2 grade to help increase overall functional mobility and decrease fall risk  Plan   Treatment/Interventions Functional transfer training;LE strengthening/ROM; Elevations; Therapeutic exercise; Endurance training;Patient/family training;Equipment eval/education; Bed mobility;Gait training;Spoke to nursing;OT   PT Frequency 3-5x/wk   Recommendation   PT Discharge Recommendation Home with home health rehabilitation   Equipment Recommended [facility] Recommended Wheeled walker   Additional Comments Pt denies any mobility or safety concerns about going home   AM-PAC Basic Mobility Inpatient   Turning in Flat Bed Without Bedrails 3   Lying on Back to Sitting on Edge of Flat Bed Without Bedrails 3   Moving Bed to Chair 3   Standing Up From Chair Using Arms 3   Walk in Room 3   Climb 3-5 Stairs With Railing 3   Basic Mobility Inpatient Raw Score 18   Basic Mobility Standardized Score 41 05   Highest Level Of Mobility   -[facility] Goal 6: Walk 10 steps or more   -HLM Achieved 7: Walk 25 feet or more   Modified Curtis Scale   Modified Curtis Scale 3   Barthel Index   Feeding 10   Bathing 5   Grooming Score 5   Dressing Score 5   Bladder Score 5   Bowels Score 10   Toilet Use Score 5   Transfers (Bed/Chair) Score 10   Mobility (Level Surface) Score 10   Stairs Score 5   Barthel Index Score 70   End of Consult   Patient Position at End of Consult Bedside chair; All needs within reach;Bed/Chair alarm activated     Jae Jones, SPT  02/13/23

## 2023-02-13 NOTE — PROGRESS NOTES
[address] [facility]  Progress Note Maris Mata 1945, 70 y o  female MRN: [id number]  Unit/Bed#: [facility] 726-76 Encounter: [id number]  Primary Care Provider: Efren Hinojosa MD   Date and time admitted to hospital: 2/12/2023  7:21 PM    * SDH (subdural hematoma)  Assessment & Plan  - Small SDH  - appreciate neurosurgery evaluation and recommendations  - repeat CT head showed slight increase in size of SDH  - not cleared for chemical DVT ppx from neurosurgery standpoint at this time  - obtain repeat CT head to ensure stability on 2/14  - decrease neuro checks to q4h and d/c HOT protocol  - GCS 15, non-focal exam  - Tylenol for headache  - PT/OT and cog eval    Scalp laceration  Assessment & Plan  - Repaired with 2-0 nylon bedside  - local wound care  - f/u with trauma in 1 week for suture removal    Alcohol abuse  Assessment & Plan  - drinks 3 glasses of wine per day  - placed on CIWA protocol  - has a mild tremor which she states is chronic  No other s/s of withdrawal at this time  Anxiety  Assessment & Plan  Continue home celexa and alprazolam    Hypothyroidism  Assessment & Plan  Continue home levothyroxine     HTN (hypertension)  Assessment & Plan  Continue home amlodipine        TRAUMA TERTIARY SURVEY NOTE    VTE Prophylaxis:Sequential compression device (Venodyne)      Disposition: downgrade to med-surg status, PT/OT evaluations, repeat CT head in AM, neuro checks    Code status:  Level 1 - Full Code    Consultants: IP CONSULT TO NEUROSURGERY  IP CONSULT TO CASE MANAGEMENT    Subjective   Transfer from: N/A    Mechanism of Injury:Fall     Chief Complaint: "I'm doing okay"    HPI/Last 24 hour events: Patient denies headache, dizziness, chest pain, N/V, abdominal pain  She has been up and ambulatory without difficulty  She would like to go home today        Objective   Vitals:   Temp:  [97 6 °F (36 4 °C)-98 °F (36 7 °C)] 98 °F (36 7 °C)  HR:  [64-82] 64  Resp:  [16-20] 16  BP: ()/(51-72) 113/72    I/O       02/11 0701  02/12 0700 02/12 0701  02/13 0700 02/13 0701  02/14 0700    P  O    118    I V  (mL/kg)  500 (11 5)     Total Intake(mL/kg)  500 (11 5) 118 (2 7)    Net  +500 +118                  Physical Exam:   GENERAL APPEARANCE: NAD  NEURO: GCS 15,non-focal  HEENT: + posterior scalp laceration with sutures in place  CV: RRR, no MGR  LUNGS: CTA bilaterally  GI: soft,non-tender,non-distended  : voiding  MSK: no edema, contusion or deformities  SKIN: pink, warm, dry    Invasive Devices     Peripheral Intravenous Line  Duration           Peripheral IV 02/13/23 Right;Ventral (anterior) Forearm <1 day                   Lab Results:   Results: I have personally reviewed all pertinent laboratory/tests results, BMP/CMP:   Lab Results   Component Value Date    SODIUM 137 02/13/2023    K 3 7 02/13/2023     02/13/2023    CO2 30 02/13/2023    CO2 32 02/12/2023    BUN 9 02/13/2023    CREATININE 0 33 (L) 02/13/2023    GLUCOSE 115 02/12/2023    CALCIUM 8 3 02/13/2023    AST 16 02/13/2023    ALT 10 (L) 02/13/2023    ALKPHOS 89 02/13/2023    EGFR 108 02/13/2023    and CBC:   Lab Results   Component Value Date    WBC 15 79 (H) 02/12/2023    HGB 14 0 02/12/2023    HCT 41 0 02/12/2023     (H) 02/12/2023     02/12/2023    MCH 34 7 (H) 02/12/2023    MCHC 33 8 02/12/2023    RDW 14 3 02/12/2023    MPV 9 5 02/12/2023    NRBC 0 02/12/2023       Imaging Results: I have personally reviewed pertinent reports  CT head wo contrast    Result Date: 2/13/2023  Impression: Slight interval increase in size of the left frontal convexity subdural hematoma  Small volume regional subarachnoid hemorrhage is again noted with a suspected small associated contusion  The study was marked in [facility] for immediate notification   Workstation performed: [id number]     TRAUMA - CT head wo contrast    Result Date: 2/12/2023  Impression: Small amount of subarachnoid hemorrhage in the left frontal lobe with an adjacent small focal subdural hematoma measuring 5 mm in thickness  No mass effect or midline shift  Scalp hematoma and laceration and right occipital region, however no evidence of calvarial fracture  I personally discussed this study with GLENIS CONNOLLY on 2/12/2023 at 8:12 PM   Workstation performed: [id number]     TRAUMA - CT spine cervical wo contrast    Result Date: 2/12/2023  Impression: No cervical spine fracture or traumatic malalignment  Moderate multilevel degenerative changes  I personally discussed this study with GLENIS CONNOLLY on 2/12/2023 at 8:12 PM  Workstation performed: [id number]     XR chest 1 view    Result Date: 2/13/2023  Impression: No acute pulmonary pathology  Workstation performed: [id number]     XR Trauma multiple (SLB/SLRA trauma bay ONLY)    Result Date: 2/12/2023  Impression: No acute pulmonary pathology   Workstation performed: [id number]     Other Studies: no new

## 2023-02-13 NOTE — ASSESSMENT & PLAN NOTE
- drinks 3 glasses of wine per day  - placed on CIWA protocol  - has a mild tremor which she states is chronic  No other s/s of withdrawal at this time

## 2023-02-13 NOTE — ASSESSMENT & PLAN NOTE
- Repaired with 2-0 nylon bedside  - local wound care  - f/u with trauma in 1 week for suture removal

## 2023-02-13 NOTE — PLAN OF CARE
Problem: PAIN - ADULT  Goal: Verbalizes/displays adequate comfort level or baseline comfort level  Description: Interventions:  - Encourage patient to monitor pain and request assistance  - Assess pain using appropriate pain scale  - Administer analgesics based on type and severity of pain and evaluate response  - Implement non-pharmacological measures as appropriate and evaluate response  - Consider cultural and social influences on pain and pain management  - Notify physician/advanced practitioner if interventions unsuccessful or patient reports new pain  Outcome: Progressing     Problem: INFECTION - ADULT  Goal: Absence or prevention of progression during hospitalization  Description: INTERVENTIONS:  - Assess and monitor for signs and symptoms of infection  - Monitor lab/diagnostic results  - Monitor all insertion sites, i e  indwelling lines, tubes, and drains  - Monitor endotracheal if appropriate and nasal secretions for changes in amount and color  - Guys appropriate cooling/warming therapies per order  - Administer medications as ordered  - Instruct and encourage patient and family to use good hand hygiene technique  - Identify and instruct in appropriate isolation precautions for identified infection/condition  Outcome: Progressing  Goal: Absence of fever/infection during neutropenic period  Description: INTERVENTIONS:  - Monitor WBC    Outcome: Progressing     Problem: SAFETY ADULT  Goal: Patient will remain free of falls  Description: INTERVENTIONS:  - Educate patient/family on patient safety including physical limitations  - Instruct patient to call for assistance with activity   - Consult OT/PT to assist with strengthening/mobility   - Keep Call bell within reach  - Keep bed low and locked with side rails adjusted as appropriate  - Keep care items and personal belongings within reach  - Initiate and maintain comfort rounds  - Make Fall Risk Sign visible to staff  - Offer Toileting   - Obtain necessary fall risk management equipment  - Apply yellow socks and bracelet for high fall risk patients  - Consider moving patient to room near nurses station  Outcome: Progressing  Goal: Maintain or return to baseline ADL function  Description: INTERVENTIONS:  -  Assess patient's ability to carry out ADLs; assess patient's baseline for ADL function and identify physical deficits which impact ability to perform ADLs (bathing, care of mouth/teeth, toileting, grooming, dressing, etc )  - Assess/evaluate cause of self-care deficits   - Assess range of motion  - Assess patient's mobility; develop plan if impaired  - Assess patient's need for assistive devices and provide as appropriate  - Encourage maximum independence but intervene and supervise when necessary  - Involve family in performance of ADLs  - Assess for home care needs following discharge   - Consider OT consult to assist with ADL evaluation and planning for discharge  - Provide patient education as appropriate  Outcome: Progressing  Goal: Maintains/Returns to pre admission functional level  Description: INTERVENTIONS:  - Perform BMAT or MOVE assessment daily    - Set and communicate daily mobility goal to care team and patient/family/caregiver     - Collaborate with rehabilitation services on mobility goals if consulted  - Out of bed for toileting  - Record patient progress and toleration of activity level   Outcome: Progressing     Problem: DISCHARGE PLANNING  Goal: Discharge to home or other facility with appropriate resources  Description: INTERVENTIONS:  - Identify barriers to discharge w/patient and caregiver  - Arrange for needed discharge resources and transportation as appropriate  - Identify discharge learning needs (meds, wound care, etc )  - Arrange for interpretive services to assist at discharge as needed  - Refer to Case Management Department for coordinating discharge planning if the patient needs post-hospital services based on physician/advanced practitioner order or complex needs related to functional status, cognitive ability, or social support system  Outcome: Progressing     Problem: Knowledge Deficit  Goal: Patient/family/caregiver demonstrates understanding of disease process, treatment plan, medications, and discharge instructions  Description: Complete learning assessment and assess knowledge base  Interventions:  - Provide teaching at level of understanding  - Provide teaching via preferred learning methods  Outcome: Progressing     Problem: MOBILITY - ADULT  Goal: Maintain or return to baseline ADL function  Description: INTERVENTIONS:  -  Assess patient's ability to carry out ADLs; assess patient's baseline for ADL function and identify physical deficits which impact ability to perform ADLs (bathing, care of mouth/teeth, toileting, grooming, dressing, etc )  - Assess/evaluate cause of self-care deficits   - Assess range of motion  - Assess patient's mobility; develop plan if impaired  - Assess patient's need for assistive devices and provide as appropriate  - Encourage maximum independence but intervene and supervise when necessary  - Involve family in performance of ADLs  - Assess for home care needs following discharge   - Consider OT consult to assist with ADL evaluation and planning for discharge  - Provide patient education as appropriate  Outcome: Progressing  Goal: Maintains/Returns to pre admission functional level  Description: INTERVENTIONS:  - Perform BMAT or MOVE assessment daily    - Set and communicate daily mobility goal to care team and patient/family/caregiver     - Collaborate with rehabilitation services on mobility goals if consulted  - Out of bed for toileting  - Record patient progress and toleration of activity level   Outcome: Progressing     Problem: Prexisting or High Potential for Compromised Skin Integrity  Goal: Skin integrity is maintained or improved  Description: INTERVENTIONS:  - Identify patients at risk for skin breakdown  - Assess and monitor skin integrity  - Assess and monitor nutrition and hydration status  - Monitor labs   - Assess for incontinence   - Turn and reposition patient  - Assist with mobility/ambulation  - Relieve pressure over bony prominences  - Avoid friction and shearing  - Provide appropriate hygiene as needed including keeping skin clean and dry  - Evaluate need for skin moisturizer/barrier cream  - Collaborate with interdisciplinary team   - Patient/family teaching  - Consider wound care consult   Outcome: Progressing

## 2023-02-13 NOTE — PROCEDURES
Laceration repair    Date/Time: 2/12/2023 8:08 PM  Performed by: Aileen Diggs MD  Authorized by: Aileen Diggs MD   Consent: Verbal consent obtained  Risks and benefits: risks, benefits and alternatives were discussed  Consent given by: patient  Patient understanding: patient states understanding of the procedure being performed  Radiology Images displayed and confirmed  If images not available, report reviewed: imaging studies available  Required items: required blood products, implants, devices, and special equipment available  Patient identity confirmed: verbally with patient and arm band  Time out: Immediately prior to procedure a "time out" was called to verify the correct patient, procedure, equipment, support staff and site/side marked as required    Body area: head/neck  Location details: scalp  Laceration length: 5 cm  Foreign bodies: no foreign bodies  Anesthesia: local infiltration    Anesthesia:  Local Anesthetic: lidocaine 1% with epinephrine  Anesthetic total: 10 mL    Wound Dehiscence:  Superficial Wound Dehiscence: simple closure      Procedure Details:  Irrigation solution: saline (and peroxide )  Irrigation method: tap  Amount of cleaning: standard  Debridement: none  Degree of undermining: none  Skin closure: 3-0 nylon  Number of sutures: 6  Technique: simple (and figure of eight )  Approximation: close  Approximation difficulty: simple  Dressing: 4x4 sterile gauze  Comments: Hematoma under the laceration, small amount of clot evacuated

## 2023-02-13 NOTE — PROCEDURES
Laceration repair    Date/Time: 2/12/2023 7:45 PM  Performed by: Nichole Guzman MD  Authorized by: Nichole Guzman MD   Consent: The procedure was performed in an emergent situation  Verbal consent obtained  Consent given by: patient  Patient understanding: patient states understanding of the procedure being performed  Required items: required blood products, implants, devices, and special equipment available  Patient identity confirmed: verbally with patient and arm band  Body area: head/neck  Location details: scalp  Laceration length: 5 cm  Vascular damage: yes    Anesthesia:  Local Anesthetic: lidocaine 1% with epinephrine  Anesthetic total: 10 mL    Sedation:  Patient sedated: no        Procedure Details:  Skin closure: 3-0 nylon  Number of sutures: 1  Technique: simple  Approximation: close  Approximation difficulty: simple  Dressing: 4x4 sterile gauze  Patient tolerance: patient tolerated the procedure well with no immediate complications  Comments: Right posterior head laceration with arterial bleeding whip stitched with one figure of eight 3-0 nylon in the trauma bay  Will require bedside revision once life threatening injuries ruled out  Patient temp not decreasing  Temp at 104 5  Caleb Elias  Put pt on cooling blanket set at 98 6F  Pt temp at 103 8F  Will continue to monitor

## 2023-02-14 ENCOUNTER — APPOINTMENT (INPATIENT)
Dept: RADIOLOGY | Facility: HOSPITAL | Age: 78
End: 2023-02-14

## 2023-02-14 ENCOUNTER — TRANSITIONAL CARE MANAGEMENT (OUTPATIENT)
Dept: FAMILY MEDICINE CLINIC | Facility: CLINIC | Age: 78
End: 2023-02-14

## 2023-02-14 ENCOUNTER — TELEPHONE (OUTPATIENT)
Dept: NEUROSURGERY | Facility: CLINIC | Age: 78
End: 2023-02-14

## 2023-02-14 VITALS
OXYGEN SATURATION: 93 % | TEMPERATURE: 98.1 F | HEART RATE: 62 BPM | BODY MASS INDEX: 17.93 KG/M2 | DIASTOLIC BLOOD PRESSURE: 52 MMHG | HEIGHT: 62 IN | RESPIRATION RATE: 16 BRPM | WEIGHT: 97.44 LBS | SYSTOLIC BLOOD PRESSURE: 103 MMHG

## 2023-02-14 LAB
ANION GAP SERPL CALCULATED.3IONS-SCNC: 3 MMOL/L (ref 4–13)
ATRIAL RATE: 65 BPM
ATRIAL RATE: 68 BPM
BASOPHILS # BLD AUTO: 0.03 THOUSANDS/ÂΜL (ref 0–0.1)
BASOPHILS NFR BLD AUTO: 0 % (ref 0–1)
BUN SERPL-MCNC: 10 MG/DL (ref 5–25)
CALCIUM SERPL-MCNC: 8.5 MG/DL (ref 8.3–10.1)
CHLORIDE SERPL-SCNC: 102 MMOL/L (ref 96–108)
CO2 SERPL-SCNC: 33 MMOL/L (ref 21–32)
CREAT SERPL-MCNC: 0.33 MG/DL (ref 0.6–1.3)
EOSINOPHIL # BLD AUTO: 0.1 THOUSAND/ÂΜL (ref 0–0.61)
EOSINOPHIL NFR BLD AUTO: 1 % (ref 0–6)
ERYTHROCYTE [DISTWIDTH] IN BLOOD BY AUTOMATED COUNT: 14.2 % (ref 11.6–15.1)
GFR SERPL CREATININE-BSD FRML MDRD: 108 ML/MIN/1.73SQ M
GLUCOSE SERPL-MCNC: 85 MG/DL (ref 65–140)
HCT VFR BLD AUTO: 32.1 % (ref 34.8–46.1)
HGB BLD-MCNC: 10.9 G/DL (ref 11.5–15.4)
IMM GRANULOCYTES # BLD AUTO: 0.02 THOUSAND/UL (ref 0–0.2)
IMM GRANULOCYTES NFR BLD AUTO: 0 % (ref 0–2)
LYMPHOCYTES # BLD AUTO: 2.31 THOUSANDS/ÂΜL (ref 0.6–4.47)
LYMPHOCYTES NFR BLD AUTO: 33 % (ref 14–44)
MCH RBC QN AUTO: 34.9 PG (ref 26.8–34.3)
MCHC RBC AUTO-ENTMCNC: 34 G/DL (ref 31.4–37.4)
MCV RBC AUTO: 103 FL (ref 82–98)
MONOCYTES # BLD AUTO: 0.89 THOUSAND/ÂΜL (ref 0.17–1.22)
MONOCYTES NFR BLD AUTO: 13 % (ref 4–12)
NEUTROPHILS # BLD AUTO: 3.74 THOUSANDS/ÂΜL (ref 1.85–7.62)
NEUTS SEG NFR BLD AUTO: 53 % (ref 43–75)
NRBC BLD AUTO-RTO: 0 /100 WBCS
P AXIS: 26 DEGREES
PLATELET # BLD AUTO: 185 THOUSANDS/UL (ref 149–390)
PMV BLD AUTO: 9.8 FL (ref 8.9–12.7)
POTASSIUM SERPL-SCNC: 3.4 MMOL/L (ref 3.5–5.3)
PR INTERVAL: 142 MS
PR INTERVAL: 146 MS
QRS AXIS: 55 DEGREES
QRS AXIS: 56 DEGREES
QRSD INTERVAL: 84 MS
QRSD INTERVAL: 86 MS
QT INTERVAL: 428 MS
QT INTERVAL: 518 MS
QTC INTERVAL: 455 MS
QTC INTERVAL: 593 MS
RBC # BLD AUTO: 3.12 MILLION/UL (ref 3.81–5.12)
SODIUM SERPL-SCNC: 138 MMOL/L (ref 135–147)
T WAVE AXIS: 28 DEGREES
T WAVE AXIS: 48 DEGREES
VENTRICULAR RATE: 68 BPM
VENTRICULAR RATE: 79 BPM
WBC # BLD AUTO: 7.09 THOUSAND/UL (ref 4.31–10.16)

## 2023-02-14 RX ORDER — POTASSIUM CHLORIDE 20 MEQ/1
40 TABLET, EXTENDED RELEASE ORAL ONCE
Status: COMPLETED | OUTPATIENT
Start: 2023-02-14 | End: 2023-02-14

## 2023-02-14 RX ORDER — ACETAMINOPHEN 325 MG/1
650 TABLET ORAL EVERY 6 HOURS PRN
Refills: 0
Start: 2023-02-14

## 2023-02-14 RX ORDER — LEVETIRACETAM 500 MG/1
500 TABLET ORAL 2 TIMES DAILY
Qty: 12 TABLET | Refills: 0 | Status: SHIPPED | OUTPATIENT
Start: 2023-02-14 | End: 2023-03-03

## 2023-02-14 RX ADMIN — THIAMINE HCL TAB 100 MG 100 MG: 100 TAB at 08:03

## 2023-02-14 RX ADMIN — Medication 1 TABLET: at 08:03

## 2023-02-14 RX ADMIN — ALPRAZOLAM 1 MG: 0.5 TABLET ORAL at 08:02

## 2023-02-14 RX ADMIN — LEVETIRACETAM 500 MG: 500 TABLET, FILM COATED ORAL at 08:03

## 2023-02-14 RX ADMIN — CITALOPRAM HYDROBROMIDE 10 MG: 10 TABLET ORAL at 08:06

## 2023-02-14 RX ADMIN — SENNOSIDES 17.2 MG: 8.6 TABLET, FILM COATED ORAL at 08:03

## 2023-02-14 RX ADMIN — DOCUSATE SODIUM 100 MG: 100 CAPSULE, LIQUID FILLED ORAL at 08:03

## 2023-02-14 RX ADMIN — POTASSIUM CHLORIDE 40 MEQ: 1500 TABLET, EXTENDED RELEASE ORAL at 11:00

## 2023-02-14 RX ADMIN — LEVOTHYROXINE SODIUM 50 MCG: 50 TABLET ORAL at 05:41

## 2023-02-14 RX ADMIN — FOLIC ACID 1 MG: 1 TABLET ORAL at 08:02

## 2023-02-14 NOTE — ASSESSMENT & PLAN NOTE
- Repaired with 2-0 nylon bedside  - local wound care  - Patient prefers to f/u with PCP near her home for suture removal in 1 week

## 2023-02-14 NOTE — DISCHARGE SUMMARY
[address] [facility]  Discharge- Prashanth Cameron 1945, 70 y o  female MRN: [id number]  Unit/Bed#: [facility] 833-29 Encounter: [id number]  Primary Care Provider: Aram Guajardo MD   Date and time admitted to hospital: 2/12/2023  7:21 PM    [address]  - does not recall details of fall  - EKG shows NSR, no acute ischemic changes  Orthostatics negative  - Admits to drinking several glasses of wine in the evening    - Sustained below stated injuries    - PT/OT cleared for discharge home with Caden Bowles and RW  - CM for [facility]  Patient has a walker at home  Discharge home today  * SDH (subdural hematoma)  Assessment & Plan  - Small SDH  - appreciate neurosurgery evaluation and recommendations  - repeat CT head showed slight increase in size of SDH on 2/13  Repeat CT head on 2/14 shows stability   - not cleared for chemical DVT ppx from neurosurgery standpoint at this time  - GCS 15, non-focal exam  - Tylenol for headache  Discussed avoiding NSAIDs such as Naproxen    - PT/OT and cog eval completed  Recommended home with  and home health  Scalp laceration  Assessment & Plan  - Repaired with 2-0 nylon bedside  - local wound care  - Patient prefers to f/u with PCP near her home for suture removal in 1 week  Alcohol abuse  Assessment & Plan  - drinks 3 glasses of wine per day  - placed on CIWA protocol  - has a mild tremor which she states is chronic  No other s/s of withdrawal at this time       Anxiety  Assessment & Plan  Continue home celexa and alprazolam    Hypothyroidism  Assessment & Plan  Continue home levothyroxine     HTN (hypertension)  Assessment & Plan  Continue home amlodipine    Underweight, as evidenced by BMI 17 50, being provided vitamin supplements and regular diet, possible dietician evaluation            Medical Problems     Resolved Problems  Date Reviewed: 2/14/2023   None         Admission Date:   Admission Orders (From admission, onward)     Ordered 02/12/23 2038  Inpatient Admission  Once                        Admitting Diagnosis: SDH (subdural hematoma) [S06  5XAA]  Unspecified multiple injuries, initial encounter [T07  XXXA]    HPI: As documented by Dr Rolly Norman who evaluated the patient on admission, "Lenore Love is a 70 y o  female who presents with posterior head pain after a fall at home that was unwitnessed  She was down for about an hour before coming in to the hospital  She lost consciousness but is unsure if it was before or after the fall  She has pain isolated to the back of her head and has a significant about of blood  She is not on blood thinners  PMH of HTN, Hypothyroidism "    Procedures Performed:   Orders Placed This Encounter   Procedures   • Laceration repair   • Laceration repair       Summary of Hospital Course: Patient was placed on the trauma service s/p fall when she sustained a scalp laceration and a SDH  She was GCS 15 and non-focal exam, aside from mild LUE weakness which is chronic  She was not on AC/AP therapy  She had her scalp laceration repaired by trauma but had significant blood loss from this at the scene  She was seen by neurosurgery who recommended non-operative management  She had a f/u CT head on 2/13 which showed slight increase in SDH/SAH  She was neuro intact  She was kept overnight for additional monitoring and repeat CT head on 2/14 which did show stability  She was cleared by PT/OT for d/c home with  and [facility]  She does not recall the details of her fall but does admit to having multiple glasses of wine in the evening  EKG was unremarkable and orthostatics were negative  She was found to have a drop in Hgb from 14 to 10 3 on 2/14 which was likely due to her scalp laceration  She had no abdominal pain or respiratory/chest complaints  She had no signs of bleeding clinically   She is to f/u with neurosurgery in 2 weeks with a repeat CT head and with her PCP (per her request) in one week for suture removal  She can contact the trauma office with any concerns  Today she is feeling well  Denies headache, dizziness, N/V, vision changes, chest pain, SOB or abdominal pain  She is motivated to go home  She was informed of stable repeat CT head this morning  Exam:  Vitals:    02/14/23 0751   BP: 105/52   Pulse: 66   Resp: 16   Temp: 98 1 °F (36 7 °C)   SpO2: 93%     GEN: NAD  HEENT: + posterior scalp laceration with sutures in place, clean and dry  No active bleeding  + PERRL bilaterally  NEURO: GCS 15, + RUE 5/5 strength; + LUE with 5/5  and triceps and 4/5 biceps  B/L LE strength 5/5  CV: RRR, no MGR  PULM: CTA bilaterally  GI: soft,non-tender,non-distended  : voiding  MSK: no edema, contusion or deformity  SKIN: pink, warm,d ry      Significant Findings, Care, Treatment and Services Provided:   CT head wo contrast    Result Date: 2/14/2023  Impression: No significant interval change since prior examination  Stable small left frontal convexity subdural hematoma with regional foci of subarachnoid hemorrhage and underlying contusion, unchanged  No new areas of acute intracranial hemorrhage identified  Workstation performed: [id number]     CT head wo contrast    Result Date: 2/13/2023  Impression: Slight interval increase in size of the left frontal convexity subdural hematoma  Small volume regional subarachnoid hemorrhage is again noted with a suspected small associated contusion  The study was marked in [facility] for immediate notification  Workstation performed: [id number]     TRAUMA - CT head wo contrast    Result Date: 2/12/2023  Impression: Small amount of subarachnoid hemorrhage in the left frontal lobe with an adjacent small focal subdural hematoma measuring 5 mm in thickness  No mass effect or midline shift  Scalp hematoma and laceration and right occipital region, however no evidence of calvarial fracture   I personally discussed this study with GLENIS CONNOLLY on 2/12/2023 at 8:12 PM   Workstation performed: [id number]     TRAUMA - CT spine cervical wo contrast    Result Date: 2/12/2023  Impression: No cervical spine fracture or traumatic malalignment  Moderate multilevel degenerative changes  I personally discussed this study with GLENIS CONNOLLY on 2/12/2023 at 8:12 PM  Workstation performed: [id number]     XR chest 1 view    Result Date: 2/13/2023  Impression: No acute pulmonary pathology  Workstation performed: [id number]     XR Trauma multiple (SLB/SLRA trauma bay ONLY)    Result Date: 2/12/2023  Impression: No acute pulmonary pathology  Workstation performed: [id number]       Complications: none    Condition at Discharge: good         Discharge instructions/Information to patient and family:   See after visit summary for information provided to patient and family  Provisions for Follow-Up Care:  See after visit summary for information related to follow-up care and any pertinent home health orders  PCP: Aram Guajardo MD    Disposition: Home    Planned Readmission: No    Discharge Statement   I spent 25 minutes discharging the patient  This time was spent on the day of discharge  I had direct contact with the patient on the day of discharge  Additional documentation is required if more than 30 minutes were spent on discharge  Discharge Medications:  See after visit summary for reconciled discharge medications provided to patient and family

## 2023-02-14 NOTE — ASSESSMENT & PLAN NOTE
SDH/SAH of L frontal lobe   · Presents s/p fall with injury to head and R scalp laceration on 2/12  · H/o daily cigarette smoking and alcohol abuse with occasional naproxen use for chronic neck/back pain     Imaging:  CT head wo 2/12/23:Small amount of subarachnoid hemorrhage in the left frontal lobe with an adjacent small focal subdural hematoma measuring 5 mm in thickness  CT head wo 2/13/23:Slight interval increase in size of the left frontal convexity subdural hematoma  Small volume regional subarachnoid hemorrhage is again noted with a suspected small associated contusion  CT head wo 12/14/23:No significant interval change since prior examination  Stable small left frontal convexity subdural hematoma with regional foci of subarachnoid hemorrhage and underlying contusion, unchanged  No new areas of acute intracranial hemorrhage identified  Plan:  · Continue frequent neurological checks  · Repeat STAT CTH with any acute decline in GCS > 2pts or more in 1hr  · Maintain normotensive BP goals, SBP < 160   · No acute neurosurgical intervention anticipated at this time   · Imaging reviewed extensive with nsgy attending and pt herself  Revealed stable SDH/SAH   · Pt with stable neuro exam and resolved headaches   · Pt will follow-up in the nsgy clinic in 2 weeks as an outpatient   · Continue keppra for seizure ppx per trauma team   · Hold all antiplatelet and anticoagulation medications  · DVt ppx: SCDs, okay for chem dvt ppx from nsgy standpoint   · Medical management and pain control per primary team  · Mobilize with PT/OT    Neurosurgery will sign off at this time  Please reach out with any further questions or concerns  Will be seen in 2 weeks in the neurosurgery clinic as an outpatient for follow-up

## 2023-02-14 NOTE — PLAN OF CARE
Problem: PAIN - ADULT  Goal: Verbalizes/displays adequate comfort level or baseline comfort level  Description: Interventions:  - Encourage patient to monitor pain and request assistance  - Assess pain using appropriate pain scale  - Administer analgesics based on type and severity of pain and evaluate response  - Implement non-pharmacological measures as appropriate and evaluate response  - Consider cultural and social influences on pain and pain management  - Notify physician/advanced practitioner if interventions unsuccessful or patient reports new pain  Outcome: Progressing     Problem: INFECTION - ADULT  Goal: Absence or prevention of progression during hospitalization  Description: INTERVENTIONS:  - Assess and monitor for signs and symptoms of infection  - Monitor lab/diagnostic results  - Monitor all insertion sites, i e  indwelling lines, tubes, and drains  - Monitor endotracheal if appropriate and nasal secretions for changes in amount and color  - [place] appropriate cooling/warming therapies per order  - Administer medications as ordered  - Instruct and encourage patient and family to use good hand hygiene technique  - Identify and instruct in appropriate isolation precautions for identified infection/condition  Outcome: Progressing  Goal: Absence of fever/infection during neutropenic period  Description: INTERVENTIONS:  - Monitor WBC    Outcome: Progressing     Problem: SAFETY ADULT  Goal: Patient will remain free of falls  Description: INTERVENTIONS:  - Educate patient/family on patient safety including physical limitations  - Instruct patient to call for assistance with activity   - Consult OT/PT to assist with strengthening/mobility   - Keep Call bell within reach  - Keep bed low and locked with side rails adjusted as appropriate  - Keep care items and personal belongings within reach  - Initiate and maintain comfort rounds  - Make Fall Risk Sign visible to staff  - Offer Toileting   - Obtain necessary fall risk management equipment  - Apply yellow socks and bracelet for high fall risk patients  - Consider moving patient to room near nurses station  Outcome: Progressing  Goal: Maintain or return to baseline ADL function  Description: INTERVENTIONS:  -  Assess patient's ability to carry out ADLs; assess patient's baseline for ADL function and identify physical deficits which impact ability to perform ADLs (bathing, care of mouth/teeth, toileting, grooming, dressing, etc )  - Assess/evaluate cause of self-care deficits   - Assess range of motion  - Assess patient's mobility; develop plan if impaired  - Assess patient's need for assistive devices and provide as appropriate  - Encourage maximum independence but intervene and supervise when necessary  - Involve family in performance of ADLs  - Assess for home care needs following discharge   - Consider OT consult to assist with ADL evaluation and planning for discharge  - Provide patient education as appropriate  Outcome: Progressing  Goal: Maintains/Returns to pre admission functional level  Description: INTERVENTIONS:  - Perform BMAT or MOVE assessment daily    - Set and communicate daily mobility goal to care team and patient/family/caregiver     - Collaborate with rehabilitation services on mobility goals if consulted  - Out of bed for toileting  - Record patient progress and toleration of activity level   Outcome: Progressing     Problem: DISCHARGE PLANNING  Goal: Discharge to home or other facility with appropriate resources  Description: INTERVENTIONS:  - Identify barriers to discharge w/patient and caregiver  - Arrange for needed discharge resources and transportation as appropriate  - Identify discharge learning needs (meds, wound care, etc )  - Arrange for interpretive services to assist at discharge as needed  - Refer to Case Management Department for coordinating discharge planning if the patient needs post-hospital services based on physician/advanced practitioner order or complex needs related to functional status, cognitive ability, or social support system  Outcome: Progressing     Problem: Knowledge Deficit  Goal: Patient/family/caregiver demonstrates understanding of disease process, treatment plan, medications, and discharge instructions  Description: Complete learning assessment and assess knowledge base  Interventions:  - Provide teaching at level of understanding  - Provide teaching via preferred learning methods  Outcome: Progressing     Problem: MOBILITY - ADULT  Goal: Maintain or return to baseline ADL function  Description: INTERVENTIONS:  -  Assess patient's ability to carry out ADLs; assess patient's baseline for ADL function and identify physical deficits which impact ability to perform ADLs (bathing, care of mouth/teeth, toileting, grooming, dressing, etc )  - Assess/evaluate cause of self-care deficits   - Assess range of motion  - Assess patient's mobility; develop plan if impaired  - Assess patient's need for assistive devices and provide as appropriate  - Encourage maximum independence but intervene and supervise when necessary  - Involve family in performance of ADLs  - Assess for home care needs following discharge   - Consider OT consult to assist with ADL evaluation and planning for discharge  - Provide patient education as appropriate  Outcome: Progressing  Goal: Maintains/Returns to pre admission functional level  Description: INTERVENTIONS:  - Perform BMAT or MOVE assessment daily    - Set and communicate daily mobility goal to care team and patient/family/caregiver     - Collaborate with rehabilitation services on mobility goals if consulted  - Out of bed for toileting  - Record patient progress and toleration of activity level   Outcome: Progressing     Problem: Prexisting or High Potential for Compromised Skin Integrity  Goal: Skin integrity is maintained or improved  Description: INTERVENTIONS:  - Identify patients at risk for skin breakdown  - Assess and monitor skin integrity  - Assess and monitor nutrition and hydration status  - Monitor labs   - Assess for incontinence   - Turn and reposition patient  - Assist with mobility/ambulation  - Relieve pressure over bony prominences  - Avoid friction and shearing  - Provide appropriate hygiene as needed including keeping skin clean and dry  - Evaluate need for skin moisturizer/barrier cream  - Collaborate with interdisciplinary team   - Patient/family teaching  - Consider wound care consult   Outcome: Progressing     Problem: Nutrition/Hydration-ADULT  Goal: Nutrient/Hydration intake appropriate for improving, restoring or maintaining nutritional needs  Description: Monitor and assess patient's nutrition/hydration status for malnutrition  Collaborate with interdisciplinary team and initiate plan and interventions as ordered  Monitor patient's weight and dietary intake as ordered or per policy  Utilize nutrition screening tool and intervene as necessary  Determine patient's food preferences and provide high-protein, high-caloric foods as appropriate       INTERVENTIONS:  - Monitor oral intake, urinary output, labs, and treatment plans  - Assess nutrition and hydration status and recommend course of action  - Evaluate amount of meals eaten  - Assist patient with eating if necessary   - Allow adequate time for meals  - Recommend/ encourage appropriate diets, oral nutritional supplements, and vitamin/mineral supplements  - Order, calculate, and assess calorie counts as needed  - Recommend, monitor, and adjust tube feedings and TPN/PPN based on assessed needs  - Assess need for intravenous fluids  - Provide specific nutrition/hydration education as appropriate  - Include patient/family/caregiver in decisions related to nutrition  Outcome: Progressing

## 2023-02-14 NOTE — ASSESSMENT & PLAN NOTE
- Small SDH  - appreciate neurosurgery evaluation and recommendations  - repeat CT head showed slight increase in size of SDH on 2/13  Repeat CT head on 2/14 shows stability   - not cleared for chemical DVT ppx from neurosurgery standpoint at this time  - GCS 15, non-focal exam  - Tylenol for headache  Discussed avoiding NSAIDs such as Naproxen    - PT/OT and cog eval completed  Recommended home with  and home health

## 2023-02-14 NOTE — DISCHARGE INSTR - AVS FIRST PAGE
Discharge Instructions - Neurosurgery    Do not take any blood thinning medications (ie  No Advil  No motrin  No ibuprofen  No Aleve  No Aspirin  No fishoil  No heparin  No antiplatelet / no anticoagulation medication)  Refrain from activity that increases chance of trauma to head or falls  Recommend you take fall precaution  No strenuous activity or sports  Return to hospital Emergency Room if you experience worsening / new headache, nausea/vomiting, speech/vision change, seizure, confusion / mental status change, weakness, or other neurological changes  Please follow up in 2 weeks with the Neurosurgical group with repeat CT head without contrast 2-3 days prior to your appointment  You may go to any [facility] facility to get your imaging done  Please call [phone] to schedule your imaging appointment  Wash scalp wound daily in the shower, gently with warm, soapy water  Keep clean and dry otherwise  F/u in one week with family doctor or with trauma for suture removal  Seek medical attn if you develop redness, swelling, or drainage from the wound or if you develop fevers/chills

## 2023-02-14 NOTE — PROGRESS NOTES
[address] [facility]  Progress Note Golden Sanchez 1945, 70 y o  female MRN: [id number]  Unit/Bed#: [facility] 670-94 Encounter: [id number]  Primary Care Provider: Kiana Vázquez MD   Date and time admitted to hospital: 2/12/2023  7:21 PM    * SDH (subdural hematoma)  Assessment & Plan  SDH/SAH of L frontal lobe   · Presents s/p fall with injury to head and R scalp laceration on 2/12  · H/o daily cigarette smoking and alcohol abuse with occasional naproxen use for chronic neck/back pain     Imaging:  CT head wo 2/12/23:Small amount of subarachnoid hemorrhage in the left frontal lobe with an adjacent small focal subdural hematoma measuring 5 mm in thickness  CT head wo 2/13/23:Slight interval increase in size of the left frontal convexity subdural hematoma  Small volume regional subarachnoid hemorrhage is again noted with a suspected small associated contusion  CT head wo 12/14/23:No significant interval change since prior examination  Stable small left frontal convexity subdural hematoma with regional foci of subarachnoid hemorrhage and underlying contusion, unchanged  No new areas of acute intracranial hemorrhage identified  Plan:  · Continue frequent neurological checks  · Repeat STAT CTH with any acute decline in GCS > 2pts or more in 1hr  · Maintain normotensive BP goals, SBP < 160   · No acute neurosurgical intervention anticipated at this time   · Imaging reviewed extensive with nsgy attending and pt herself  Revealed stable SDH/SAH   · Pt with stable neuro exam and resolved headaches   · Pt will follow-up in the nsgy clinic in 2 weeks as an outpatient   · Continue keppra for seizure ppx per trauma team   · Hold all antiplatelet and anticoagulation medications  · DVt ppx: SCDs, okay for chem dvt ppx from nsgy standpoint   · Medical management and pain control per primary team  · Mobilize with PT/OT    Neurosurgery will sign off at this time    Please reach out with any further questions or concerns  Will be seen in 2 weeks in the neurosurgery clinic as an outpatient for follow-up  Subjective/Objective   Chief Complaint: No acute complaints today    Subjective: Pt is feeling well overall with no acute complaints  Mild head pain present yesterday has resolved, she denies any acute vision changes, dizziness, lightheadeness, confusion, changes in bowel movements or incontinence, new arm or leg weakness or paresthesias  Objective: NAD, sitting up in bed eating breakfest    I/O       02/12 0701 02/13 0700 02/13 0701 02/14 0700 02/14 0701  02/15 0700    P  O   358     I V  (mL/kg) 500 (11 5)      Total Intake(mL/kg) 500 (11 5) 358 (8 1)     Net +500 +358                Invasive Devices     Peripheral Intravenous Line  Duration           Peripheral IV 02/13/23 Right;Ventral (anterior) Forearm 1 day              Physical Exam:  Vitals: Blood pressure 103/52, pulse 62, temperature 98 1 °F (36 7 °C), resp  rate 16, height 5' 2" (1 575 m), weight 44 2 kg (97 lb 7 1 oz), SpO2 93 %  ,Body mass index is 17 82 kg/m²      General appearance: alert, appears stated age, cooperative and no distress  Head: Normocephalic, dried blood of posterior and R scalp, laceration of R parietal scalp closed with interrupted sutures partially obscured by thick scabbing   Eyes: EOMI, PERRL  Neck: 3 x 2 5 cm ecchymosis of R neck, symmetrical, trachea midline and NT  Back: no kyphosis present, no tenderness to palpation  Lungs: non labored breathing, no resp distress on room air   Heart: regular heart rate  Neurologic:   Mental status: Alert, oriented x 3, thought content appropriate  Cranial nerves: grossly intact (Cranial nerves II-XII)  Sensory: normal to LT of lower and upper extremities   Motor: moving all extremities without focal weakness  RUE: 5/5 throughout   LUE: 4/5 at deltoid and bicep (baseline) , 5/5  strength, triceps, wrist   RLE: 5/5 throughout  Reflexes: 2+ and symmetric  Coordination: no drift bilaterally    Lab Results:  Results from last 7 days   Lab Units 02/14/23 0437 02/12/23 2056 02/12/23 2018   WBC Thousand/uL 7 09  --  15 79*   HEMOGLOBIN g/dL 10 9* 14 0 13 9   HEMATOCRIT % 32 1* 41 0 41 1   PLATELETS Thousands/uL 185  --  248   NEUTROS PCT % 53  --  81*   MONOS PCT % 13*  --  6     Results from last 7 days   Lab Units 02/14/23  0437 02/13/23  0641 02/12/23 1934 02/12/23 1932   POTASSIUM mmol/L 3 4* 3 7  --  3 8   CHLORIDE mmol/L 102 104  --  104   CO2 mmol/L 33* 30  --  30   CO2, I-STAT mmol/L  --   --  32  --    BUN mg/dL 10 9  --  7   CREATININE mg/dL 0 33* 0 33*  --  0 47*   CALCIUM mg/dL 8 5 8 3  --  8 8   ALK PHOS U/L  --  89  --   --    ALT U/L  --  10*  --   --    AST U/L  --  16  --   --    GLUCOSE, ISTAT mg/dl  --   --  115  --                  No results found for: TROPONINT  ABG:No results found for: PHART, PLH3YBB, PO2ART, PBG5LFL, Z3XKULSO, BEART, SOURCE    Imaging Studies: I have personally reviewed pertinent reports  and I have personally reviewed pertinent films in PACS    CT head wo contrast    Result Date: 2/13/2023  Impression: Slight interval increase in size of the left frontal convexity subdural hematoma  Small volume regional subarachnoid hemorrhage is again noted with a suspected small associated contusion  The study was marked in [facility] for immediate notification  Workstation performed: [id number]     TRAUMA - CT head wo contrast    Result Date: 2/12/2023  Impression: Small amount of subarachnoid hemorrhage in the left frontal lobe with an adjacent small focal subdural hematoma measuring 5 mm in thickness  No mass effect or midline shift  Scalp hematoma and laceration and right occipital region, however no evidence of calvarial fracture   I personally discussed this study with GLENIS CONNOLLY on 2/12/2023 at 8:12 PM   Workstation performed: [id number]     TRAUMA - CT spine cervical wo contrast    Result Date: 2/12/2023  Impression: No cervical spine fracture or traumatic malalignment  Moderate multilevel degenerative changes  I personally discussed this study with GLENIS CONNOLLY on 2/12/2023 at 8:12 PM  Workstation performed: [id number]     XR chest 1 view    Result Date: 2/13/2023  Impression: No acute pulmonary pathology  Workstation performed: [id number]     XR Trauma multiple (SLB/SLRA trauma bay ONLY)    Result Date: 2/12/2023  Impression: No acute pulmonary pathology  Workstation performed: [id number]       EKG, Pathology, and Other Studies: I have personally reviewed pertinent reports        VTE Pharmacologic Prophylaxis: Sequential compression device (Venodyne) okay for chem dvt ppx today from nsgy standpoint     VTE Mechanical Prophylaxis: sequential compression device

## 2023-02-14 NOTE — CASE MANAGEMENT
Case Management Discharge Planning Note    Patient name Panfilo Craven  Location [address] 608/PPHP 776-39 MRN [id number]  : 1945 Date 2023       Current Admission Date: 2023  Current Admission Diagnosis:SDH (subdural hematoma)   Patient Active Problem List    Diagnosis Date Noted   • Alcohol abuse 2023   • Fall 2023   • SDH (subdural hematoma) 2023   • HTN (hypertension) 2023   • Hypothyroidism 2023   • Anxiety 2023   • Scalp laceration 2023      LOS (days): 2  Geometric Mean LOS (GMLOS) (days): 2 10  Days to GMLOS:0 5     OBJECTIVE:  Risk of Unplanned Readmission Score: 8 77         Current admission status: Inpatient   Preferred Pharmacy:   PATIENT/FAMILY REPORTS NO PREFERRED PHARMACY  No address on file      [address] [address]  Phone: [phone] Fax: [phone]    Primary Care Provider: Ruby Arambula MD    Primary Insurance: [facility]  Secondary Insurance:     DISCHARGE DETAILS:    Discharge planning discussed with[de-identified] patient  Freedom of Choice: Yes     CM contacted family/caregiver?: Yes  Were Treatment Team discharge recommendations reviewed with patient/caregiver?: Yes  Did patient/caregiver verbalize understanding of patient care needs?: Yes  Were patient/caregiver advised of the risks associated with not following Treatment Team discharge recommendations?: Yes    Contacts  Patient Contacts: Jennifer Lemus (Spouse) [phone]  Relationship to Patient[de-identified] Family  Contact Method: Phone  Phone Number: [phone]  Reason/Outcome: Continuity of Care, Emergency [address]         Is the patient interested in [facility] at discharge?: Yes  [address] requested[de-identified] Physical Therapy, Occupational [address] Name[de-identified] Other ([address])  [address] [place] [address] Provider[de-identified] PCP  [address] Needed[de-identified] Gait/ADL Training, Evaluate Functional Status and Safety, Strengthening/Theraputic Exercises to Improve Function  Homebound Criteria Met[de-identified] Requires the Assistance of Another Person for Safe Ambulation or to Leave the Home, Uses an Assist Device (i e  cane, walker, etc)  Supporting Clincal Findings[de-identified] Limited Endurance, Fatigues Easliy in [employer]    DME Referral Provided  Referral made for DME?: No    Other Referral/Resources/Interventions Provided:  Interventions: [facility]    Treatment Team Recommendation: Home with [facility]  Discharge Destination Plan[de-identified] [facility] [address] [place] [address]

## 2023-02-14 NOTE — PROGRESS NOTES
Underweight, as evidenced by BMI 17 50, being provided vitamin supplements and regular diet, possible dietician evaluation

## 2023-02-14 NOTE — PLAN OF CARE
Problem: PAIN - ADULT  Goal: Verbalizes/displays adequate comfort level or baseline comfort level  Description: Interventions:  - Encourage patient to monitor pain and request assistance  - Assess pain using appropriate pain scale  - Administer analgesics based on type and severity of pain and evaluate response  - Implement non-pharmacological measures as appropriate and evaluate response  - Consider cultural and social influences on pain and pain management  - Notify physician/advanced practitioner if interventions unsuccessful or patient reports new pain  Outcome: Progressing     Problem: INFECTION - ADULT  Goal: Absence or prevention of progression during hospitalization  Description: INTERVENTIONS:  - Assess and monitor for signs and symptoms of infection  - Monitor lab/diagnostic results  - Monitor all insertion sites, i e  indwelling lines, tubes, and drains  - Monitor endotracheal if appropriate and nasal secretions for changes in amount and color  - [place] appropriate cooling/warming therapies per order  - Administer medications as ordered  - Instruct and encourage patient and family to use good hand hygiene technique  - Identify and instruct in appropriate isolation precautions for identified infection/condition  Outcome: Progressing  Goal: Absence of fever/infection during neutropenic period  Description: INTERVENTIONS:  - Monitor WBC    Outcome: Progressing     Problem: SAFETY ADULT  Goal: Patient will remain free of falls  Description: INTERVENTIONS:  - Educate patient/family on patient safety including physical limitations  - Instruct patient to call for assistance with activity   - Consult OT/PT to assist with strengthening/mobility   - Keep Call bell within reach  - Keep bed low and locked with side rails adjusted as appropriate  - Keep care items and personal belongings within reach  - Initiate and maintain comfort rounds  - Make Fall Risk Sign visible to staff  - Offer Toileting   - Obtain necessary fall risk management equipment  - Apply yellow socks and bracelet for high fall risk patients  - Consider moving patient to room near nurses station  Outcome: Progressing  Goal: Maintain or return to baseline ADL function  Description: INTERVENTIONS:  -  Assess patient's ability to carry out ADLs; assess patient's baseline for ADL function and identify physical deficits which impact ability to perform ADLs (bathing, care of mouth/teeth, toileting, grooming, dressing, etc )  - Assess/evaluate cause of self-care deficits   - Assess range of motion  - Assess patient's mobility; develop plan if impaired  - Assess patient's need for assistive devices and provide as appropriate  - Encourage maximum independence but intervene and supervise when necessary  - Involve family in performance of ADLs  - Assess for home care needs following discharge   - Consider OT consult to assist with ADL evaluation and planning for discharge  - Provide patient education as appropriate  Outcome: Progressing  Goal: Maintains/Returns to pre admission functional level  Description: INTERVENTIONS:  - Perform BMAT or MOVE assessment daily    - Set and communicate daily mobility goal to care team and patient/family/caregiver     - Collaborate with rehabilitation services on mobility goals if consulted  - Out of bed for toileting  - Record patient progress and toleration of activity level   Outcome: Progressing     Problem: DISCHARGE PLANNING  Goal: Discharge to home or other facility with appropriate resources  Description: INTERVENTIONS:  - Identify barriers to discharge w/patient and caregiver  - Arrange for needed discharge resources and transportation as appropriate  - Identify discharge learning needs (meds, wound care, etc )  - Arrange for interpretive services to assist at discharge as needed  - Refer to Case Management Department for coordinating discharge planning if the patient needs post-hospital services based on physician/advanced practitioner order or complex needs related to functional status, cognitive ability, or social support system  Outcome: Progressing     Problem: Knowledge Deficit  Goal: Patient/family/caregiver demonstrates understanding of disease process, treatment plan, medications, and discharge instructions  Description: Complete learning assessment and assess knowledge base  Interventions:  - Provide teaching at level of understanding  - Provide teaching via preferred learning methods  Outcome: Progressing     Problem: MOBILITY - ADULT  Goal: Maintain or return to baseline ADL function  Description: INTERVENTIONS:  -  Assess patient's ability to carry out ADLs; assess patient's baseline for ADL function and identify physical deficits which impact ability to perform ADLs (bathing, care of mouth/teeth, toileting, grooming, dressing, etc )  - Assess/evaluate cause of self-care deficits   - Assess range of motion  - Assess patient's mobility; develop plan if impaired  - Assess patient's need for assistive devices and provide as appropriate  - Encourage maximum independence but intervene and supervise when necessary  - Involve family in performance of ADLs  - Assess for home care needs following discharge   - Consider OT consult to assist with ADL evaluation and planning for discharge  - Provide patient education as appropriate  Outcome: Progressing  Goal: Maintains/Returns to pre admission functional level  Description: INTERVENTIONS:  - Perform BMAT or MOVE assessment daily    - Set and communicate daily mobility goal to care team and patient/family/caregiver     - Collaborate with rehabilitation services on mobility goals if consulted  - Out of bed for toileting  - Record patient progress and toleration of activity level   Outcome: Progressing     Problem: Prexisting or High Potential for Compromised Skin Integrity  Goal: Skin integrity is maintained or improved  Description: INTERVENTIONS:  - Identify patients at risk for skin breakdown  - Assess and monitor skin integrity  - Assess and monitor nutrition and hydration status  - Monitor labs   - Assess for incontinence   - Turn and reposition patient  - Assist with mobility/ambulation  - Relieve pressure over bony prominences  - Avoid friction and shearing  - Provide appropriate hygiene as needed including keeping skin clean and dry  - Evaluate need for skin moisturizer/barrier cream  - Collaborate with interdisciplinary team   - Patient/family teaching  - Consider wound care consult   Outcome: Progressing     Problem: Nutrition/Hydration-ADULT  Goal: Nutrient/Hydration intake appropriate for improving, restoring or maintaining nutritional needs  Description: Monitor and assess patient's nutrition/hydration status for malnutrition  Collaborate with interdisciplinary team and initiate plan and interventions as ordered  Monitor patient's weight and dietary intake as ordered or per policy  Utilize nutrition screening tool and intervene as necessary  Determine patient's food preferences and provide high-protein, high-caloric foods as appropriate       INTERVENTIONS:  - Monitor oral intake, urinary output, labs, and treatment plans  - Assess nutrition and hydration status and recommend course of action  - Evaluate amount of meals eaten  - Assist patient with eating if necessary   - Allow adequate time for meals  - Recommend/ encourage appropriate diets, oral nutritional supplements, and vitamin/mineral supplements  - Order, calculate, and assess calorie counts as needed  - Recommend, monitor, and adjust tube feedings and TPN/PPN based on assessed needs  - Assess need for intravenous fluids  - Provide specific nutrition/hydration education as appropriate  - Include patient/family/caregiver in decisions related to nutrition  Outcome: Progressing

## 2023-02-14 NOTE — ASSESSMENT & PLAN NOTE
- does not recall details of fall  - EKG shows NSR, no acute ischemic changes  Orthostatics negative  - Admits to drinking several glasses of wine in the evening    - Sustained below stated injuries    - PT/OT cleared for discharge home with Caden Bowles and GOLDIE Swanson CM for [facility]  Patient has a walker at home  Discharge home today

## 2023-02-14 NOTE — TELEPHONE ENCOUNTER
02/15/2023-CALLED PT, CONFIRMED 02/28/2023 SLN CT HEAD AND F/U APT ON  03/02/2023 IN [place]  WITH PT       02/14/2023-PT STILL IN HOSPITAL  03/02/2023 APT W/CT HEAD

## 2023-02-14 NOTE — PROGRESS NOTES
Pastoral Care Progress Note    2023  Patient: Hiro Mireles : 1945  Admission Date & Time: 2023  MRN: [id number] CSN: [id number]           23 1100   Clinical Encounter Type   Visited With Patient   Caodaism Encounters   Caodaism Needs Prayer   Sacramental Encounters   Sacrament of Sick-Anointing Patient declined anointing     Kendall Kos visited with the pt and provided prayers and blessings  The pt declined Fr's offer for anointing  No further needs were expressed at this time  Chaplains still remain available

## 2023-02-14 NOTE — PLAN OF CARE
Problem: PAIN - ADULT  Goal: Verbalizes/displays adequate comfort level or baseline comfort level  Description: Interventions:  - Encourage patient to monitor pain and request assistance  - Assess pain using appropriate pain scale  - Administer analgesics based on type and severity of pain and evaluate response  - Implement non-pharmacological measures as appropriate and evaluate response  - Consider cultural and social influences on pain and pain management  - Notify physician/advanced practitioner if interventions unsuccessful or patient reports new pain  2/14/2023 1116 by Rubén Tovar RN  Outcome: Completed  2/14/2023 0958 by Rubén Tovar RN  Outcome: Progressing     Problem: INFECTION - ADULT  Goal: Absence or prevention of progression during hospitalization  Description: INTERVENTIONS:  - Assess and monitor for signs and symptoms of infection  - Monitor lab/diagnostic results  - Monitor all insertion sites, i e  indwelling lines, tubes, and drains  - Monitor endotracheal if appropriate and nasal secretions for changes in amount and color  - [place] appropriate cooling/warming therapies per order  - Administer medications as ordered  - Instruct and encourage patient and family to use good hand hygiene technique  - Identify and instruct in appropriate isolation precautions for identified infection/condition  2/14/2023 1116 by Rubén Tovar RN  Outcome: Completed  2/14/2023 0958 by Rubén Tovar RN  Outcome: Progressing  Goal: Absence of fever/infection during neutropenic period  Description: INTERVENTIONS:  - Monitor WBC    2/14/2023 1116 by Rubén Tovar RN  Outcome: Completed  2/14/2023 0958 by Rubén Tovar RN  Outcome: Progressing     Problem: SAFETY ADULT  Goal: Patient will remain free of falls  Description: INTERVENTIONS:  - Educate patient/family on patient safety including physical limitations  - Instruct patient to call for assistance with activity   - Consult OT/PT to assist with normal, alert, in no acute distress, well developed, well nourished, ambulating without difficulty, normal communication ability strengthening/mobility   - Keep Call bell within reach  - Keep bed low and locked with side rails adjusted as appropriate  - Keep care items and personal belongings within reach  - Initiate and maintain comfort rounds  - Make Fall Risk Sign visible to staff  - Offer [address] necessary fall risk management equipment  - Apply yellow socks and bracelet for high fall risk patients  - Consider moving patient to room near nurses station  2/14/2023 1116 by Judie Hickey RN  Outcome: Completed  2/14/2023 0958 by Judie Hickey RN  Outcome: Progressing  Goal: Maintain or return to baseline ADL function  Description: INTERVENTIONS:  -  Assess patient's ability to carry out ADLs; assess patient's baseline for ADL function and identify physical deficits which impact ability to perform ADLs (bathing, care of mouth/teeth, toileting, grooming, dressing, etc )  - Assess/evaluate cause of self-care deficits   - Assess range of motion  - Assess patient's mobility; develop plan if impaired  - Assess patient's need for assistive devices and provide as appropriate  - Encourage maximum independence but intervene and supervise when necessary  - Involve family in performance of ADLs  - Assess for home care needs following discharge   - Consider OT consult to assist with ADL evaluation and planning for discharge  - Provide patient education as appropriate  2/14/2023 1116 by Judie Hickey RN  Outcome: Completed  2/14/2023 0958 by Judie Hickey RN  Outcome: Progressing  Goal: Maintains/Returns to pre admission functional level  Description: INTERVENTIONS:  - Perform BMAT or MOVE assessment daily    - Set and communicate daily mobility goal to care team and patient/family/caregiver     - Collaborate with rehabilitation services on mobility goals if consulted  - Out of bed for toileting  - Record patient progress and toleration of activity level   2/14/2023 1116 by Judie Hickey RN  Outcome: Completed  2/14/2023 0958 by [facility] Marty Boast, RN  Outcome: Progressing     Problem: DISCHARGE PLANNING  Goal: Discharge to home or other facility with appropriate resources  Description: INTERVENTIONS:  - Identify barriers to discharge w/patient and caregiver  - Arrange for needed discharge resources and transportation as appropriate  - Identify discharge learning needs (meds, wound care, etc )  - Arrange for interpretive services to assist at discharge as needed  - Refer to Case Management Department for coordinating discharge planning if the patient needs post-hospital services based on physician/advanced practitioner order or complex needs related to functional status, cognitive ability, or social support system  2/14/2023 1116 by Jaya Marrero RN  Outcome: Completed  2/14/2023 0958 by Jaya Marrero RN  Outcome: Progressing     Problem: Knowledge Deficit  Goal: Patient/family/caregiver demonstrates understanding of disease process, treatment plan, medications, and discharge instructions  Description: Complete learning assessment and assess knowledge base    Interventions:  - Provide teaching at level of understanding  - Provide teaching via preferred learning methods  2/14/2023 1116 by Jaya Marrero RN  Outcome: Completed  2/14/2023 0958 by Jaya Marrero RN  Outcome: Progressing     Problem: MOBILITY - ADULT  Goal: Maintain or return to baseline ADL function  Description: INTERVENTIONS:  -  Assess patient's ability to carry out ADLs; assess patient's baseline for ADL function and identify physical deficits which impact ability to perform ADLs (bathing, care of mouth/teeth, toileting, grooming, dressing, etc )  - Assess/evaluate cause of self-care deficits   - Assess range of motion  - Assess patient's mobility; develop plan if impaired  - Assess patient's need for assistive devices and provide as appropriate  - Encourage maximum independence but intervene and supervise when necessary  - Involve family in performance of ADLs  - Assess for home care needs following discharge   - Consider OT consult to assist with ADL evaluation and planning for discharge  - Provide patient education as appropriate  2/14/2023 1116 by Dipti Arango RN  Outcome: Completed  2/14/2023 0958 by Dipti Arango RN  Outcome: Progressing  Goal: Maintains/Returns to pre admission functional level  Description: INTERVENTIONS:  - Perform BMAT or MOVE assessment daily    - Set and communicate daily mobility goal to care team and patient/family/caregiver  - Collaborate with rehabilitation services on mobility goals if consulted  - Out of bed for toileting  - Record patient progress and toleration of activity level   2/14/2023 1116 by Dipti Arango RN  Outcome: Completed  2/14/2023 0958 by Dipti Arango RN  Outcome: Progressing     Problem: Prexisting or High Potential for Compromised Skin Integrity  Goal: Skin integrity is maintained or improved  Description: INTERVENTIONS:  - Identify patients at risk for skin breakdown  - Assess and monitor skin integrity  - Assess and monitor nutrition and hydration status  - Monitor labs   - Assess for incontinence   - Turn and reposition patient  - Assist with mobility/ambulation  - Relieve pressure over bony prominences  - Avoid friction and shearing  - Provide appropriate hygiene as needed including keeping skin clean and dry  - Evaluate need for skin moisturizer/barrier cream  - Collaborate with interdisciplinary team   - Patient/family teaching  - Consider wound care consult   2/14/2023 1116 by Dipti Arango RN  Outcome: Completed  2/14/2023 0958 by Dipti Arango RN  Outcome: Progressing     Problem: Nutrition/Hydration-ADULT  Goal: Nutrient/Hydration intake appropriate for improving, restoring or maintaining nutritional needs  Description: Monitor and assess patient's nutrition/hydration status for malnutrition  Collaborate with interdisciplinary team and initiate plan and interventions as ordered    Monitor patient's weight and dietary intake as ordered or per policy  Utilize nutrition screening tool and intervene as necessary  Determine patient's food preferences and provide high-protein, high-caloric foods as appropriate       INTERVENTIONS:  - Monitor oral intake, urinary output, labs, and treatment plans  - Assess nutrition and hydration status and recommend course of action  - Evaluate amount of meals eaten  - Assist patient with eating if necessary   - Allow adequate time for meals  - Recommend/ encourage appropriate diets, oral nutritional supplements, and vitamin/mineral supplements  - Order, calculate, and assess calorie counts as needed  - Recommend, monitor, and adjust tube feedings and TPN/PPN based on assessed needs  - Assess need for intravenous fluids  - Provide specific nutrition/hydration education as appropriate  - Include patient/family/caregiver in decisions related to nutrition  2/14/2023 1116 by Jase Johnson RN  Outcome: Completed  2/14/2023 0958 by Jase Johnson RN  Outcome: Progressing

## 2023-02-15 NOTE — UTILIZATION REVIEW
NOTIFICATION OF ADMISSION DISCHARGE   This is a Notification of Discharge from [facility]  Please be advised that this patient has been discharge from our facility  Below you will find the admission and discharge date and time including the patient’s disposition  UTILIZATION REVIEW CONTACT:  Eric Andersen  Utilization   Network Utilization Review Department  Phone: [phone] x carefully listen to the prompts  All voicemails are confidential   Email: [email or web address]     ADMISSION INFORMATION  PRESENTATION DATE: 2/12/2023  7:21 PM  OBERVATION ADMISSION DATE:   INPATIENT ADMISSION DATE: 2/12/23  8:39 PM   DISCHARGE DATE: 2/14/2023  1:30 PM   DISPOSITION:Home/Self Care    IMPORTANT INFORMATION:  Send all requests for admission clinical reviews, approved or denied determinations and any other requests to dedicated fax number below belonging to the campus where the patient is receiving treatment   List of dedicated fax numbers:  [address] DENIALS (Administrative/Medical Necessity) [phone]   [address] (Maternity/NICU/Pediatrics) [phone]   [facility] [phone]   [facility] 87 [phone]   [phone]   [address] [facility] [phone]   90 [facility] [phone]   [address] [place] [address] 119 [phone]   [facility]  [phone]   [address] [facility] [phone]   412 [facility] [address] [facility] [phone]

## 2023-02-17 RX ORDER — BRIMONIDINE TARTRATE 2 MG/ML
SOLUTION/ DROPS OPHTHALMIC
COMMUNITY
Start: 2023-01-31

## 2023-02-20 ENCOUNTER — OFFICE VISIT (OUTPATIENT)
Dept: FAMILY MEDICINE CLINIC | Facility: CLINIC | Age: 78
End: 2023-02-20

## 2023-02-20 VITALS
RESPIRATION RATE: 18 BRPM | OXYGEN SATURATION: 95 % | HEART RATE: 60 BPM | HEIGHT: 62 IN | BODY MASS INDEX: 17.48 KG/M2 | TEMPERATURE: 97 F | SYSTOLIC BLOOD PRESSURE: 122 MMHG | DIASTOLIC BLOOD PRESSURE: 60 MMHG | WEIGHT: 95 LBS

## 2023-02-20 DIAGNOSIS — S06.5XAA SDH (SUBDURAL HEMATOMA): ICD-10-CM
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
## 2023-02-20 NOTE — ASSESSMENT & PLAN NOTE
Fall   Patient with a fall at home that caused a laceration on scalp and small subdural hematoma  Patient denies any headaches or dizziness or change in vision  Sutures were removed using sterile equipment from laceration on scalp  There was no active bleeding  No obvious signs of infection  Patient will keep the area clean and dry and shampoo gently to remove scabs

## 2023-02-20 NOTE — PROGRESS NOTES
FAMILY PRACTICE OFFICE VISIT       NAME: Rena Maradiaga  AGE: 70 y o  SEX: female       : 1945        MRN: [id number]    DATE: 2023  TIME: 10:37 AM    Assessment and Plan     Problem List Items Addressed This Visit        Nervous and Auditory    SDH (subdural hematoma)     Subdural hematoma  Patient will follow-up with neurologist early next month and have subsequent CAT scan of head on 2023 for further evaluation and monitoring of prior subdural hematoma            Other    Fall - Primary     Fall  Patient with a fall at home that caused a laceration on scalp and small subdural hematoma  Patient denies any headaches or dizziness or change in vision  Sutures were removed using sterile equipment from laceration on scalp  There was no active bleeding  No obvious signs of infection  Patient will keep the area clean and dry and shampoo gently to remove scabs  TCM Call     Date and time call was made  2023  3:45 PM    Hospital care reviewed  Records reviewed    Patient was hospitialized at  [facility]    Date of Admission  23    Date of discharge  23    Diagnosis  SDH (subdural hematoma)    Disposition  Home    Were the patients medications reviewed and updated  No    Current Symptoms  None      TCM Call     Post hospital issues  None    Should patient be enrolled in anticoag monitoring? No    Scheduled for follow up? Yes    Did you obtain your prescribed medications  Yes    Do you need help managing your prescriptions or medications  No    Is transportation to your appointment needed  No    I have advised the patient to call PCP with any new or worsening symptoms  Isabel Rodriguez MA    Counseling  Patient    Counseling topics  Importance of RX compliance    Comments  TCM scheduled 23 @ 9499 with Dr Verónica Kovacs        Summary of Hospital Course: Patient was placed on the trauma service s/p fall when she sustained a scalp laceration and a SDH   She was GCS 15 and non-focal exam, aside from mild LUE weakness which is chronic  She was not on AC/AP therapy  She had her scalp laceration repaired by trauma but had significant blood loss from this at the scene  She was seen by neurosurgery who recommended non-operative management  She had a f/u CT head on 2/13 which showed slight increase in SDH/SAH  She was neuro intact  She was kept overnight for additional monitoring and repeat CT head on 2/14 which did show stability  She was cleared by PT/OT for d/c home with RW and C  She does not recall the details of her fall but does admit to having multiple glasses of wine in the evening  EKG was unremarkable and orthostatics were negative  She was found to have a drop in Hgb from 14 to 10 3 on 2/14 which was likely due to her scalp laceration  She had no abdominal pain or respiratory/chest complaints  She had no signs of bleeding clinically  She is to f/u with neurosurgery in 2 weeks with a repeat CT head and with her PCP (per her request) in one week for suture removal  She can contact the trauma office with any concerns  Chief Complaint     Chief Complaint   Patient presents with   • Transition of Care Management   • Fatigue       History of Present Illness     I reviewed the patient's discharge summary from her latest hospitalization  Currently she denies any pain of her head or any headaches  She is scheduled to have follow-up appointment with neurologist as well as follow-up CAT scans of her head  Patient sustained a laceration on occiput part of her scalp  Fatigue  Associated symptoms include fatigue  Review of Systems   Review of Systems   Constitutional: Positive for fatigue  HENT: Negative  Eyes: Negative  Respiratory: Negative  Cardiovascular: Negative  Gastrointestinal: Negative  Genitourinary: Negative  Musculoskeletal: Negative  Skin:        As per HPI   Neurological: Negative  Psychiatric/Behavioral: Negative  Active Problem List     Patient Active Problem List   Diagnosis   • Hypertension   • Anxiety   • Hypothyroidism   • Depression, recurrent (HCC)   • Diarrhea   • Hemorrhoids   • SDH (subdural hematoma)   • HTN (hypertension)   • Hypothyroidism   • Anxiety   • Scalp laceration   • Alcohol abuse   • Fall       Past Medical History:  Past Medical History:   Diagnosis Date   • Anxiety     Last Assessed: 6/12/2017    • Depression    • FH: colonic polyps    • Glaucoma    • Hypertension     Last Assessed: 12/12/2017   • Hypothyroidism     Last Assessed: 6/12/2017   • Menopause    • Normal vaginal delivery    • Osteopenia    • Pap smear abnormality of cervix with ASCUS favoring benign    • Telangiectasis        Past Surgical History:  Past Surgical History:   Procedure Laterality Date   • CATARACT EXTRACTION     • COLONOSCOPY      complete   • DILATION AND CURETTAGE OF UTERUS  01/10/1978   • LAPAROSCOPY      Diagnostic    • TUBAL LIGATION         Family History:  Family History   Problem Relation Age of Onset   • Heart disease Mother         Cardiac Disorder    • Colonic polyp Mother    • Hypertension Mother    • Heart disease Father         Cardiac Dsiorder    • Hypertension Father    • Lung cancer Father    • Breast cancer Sister 76   • Stomach cancer Sister    • Colonic polyp Brother    • Lung cancer Brother    • Hepatitis Daughter         Alcoholic   • Arthritis Family    • Lung cancer Family    • No Known Problems Maternal Grandmother    • No Known Problems Maternal Grandfather    • No Known Problems Paternal Grandmother    • No Known Problems Paternal Grandfather    • No Known Problems Son    • Esophageal cancer Sister 64       Social History:  Social History     Socioeconomic History   • Marital status: /Civil Union     Spouse name: Not on file   • Number of children: Not on file   • Years of education: Not on file   • Highest education level: Not on file   Occupational History   • Not on file   Tobacco Use   • Smoking status: Every Day     Packs/day: 1 00     Years: 35 00     Pack years: 35 00     Types: Cigarettes   • Smokeless tobacco: Never   Vaping Use   • Vaping Use: Never used   Substance and Sexual Activity   • Alcohol use: Yes     Alcohol/week: 21 0 standard drinks     Types: 21 Glasses of wine per week     Comment: no more than 3 glasses of wine per day   • Drug use: No   • Sexual activity: Yes   Other Topics Concern   • Not on file   Social History Narrative    ** Merged History Encounter **         Drinks Coffee - 1-2 cup a day   Exercises Daily      Social Determinants of Health     Financial Resource Strain: Low Risk    • Difficulty of Paying Living Expenses: Not very hard   Food Insecurity: No Food Insecurity   • Worried About Running Out of Food in the Last Year: Never true   • Ran Out of Food in the Last Year: Never true   Transportation Needs: No Transportation Needs   • Lack of Transportation (Medical): No   • Lack of Transportation (Non-Medical): No   Physical Activity: Not on file   Stress: Not on file   Social Connections: Not on file   Intimate Partner Violence: Not on file   Housing Stability: Low Risk    • Unable to Pay for Housing in the Last Year: No   • Number of Places Lived in the Last Year: 1   • Unstable Housing in the Last Year: No       Objective     Vitals:    02/20/23 0958   BP: 122/60   Pulse: 60   Resp: 18   Temp: (!) 97 °F (36 1 °C)   SpO2: 95%     Wt Readings from Last 3 Encounters:   02/20/23 43 1 kg (95 lb)   02/14/23 44 2 kg (97 lb 7 1 oz)   09/13/22 41 8 kg (92 lb 4 oz)       Physical Exam  Constitutional:       General: She is not in acute distress  Appearance: Normal appearance  She is not ill-appearing  Cardiovascular:      Comments: Regular rate and rhythm with no murmurs  Pulmonary:      Comments: Lungs are clear to auscultation without wheezes,rales, or rhonchi  Skin:     Comments: Patient with large vertical laceration on occiput part of her head    There is significant scabbing of dried blood but no active bleeding  Sutures are in place  Neurological:      Mental Status: She is alert  Mental status is at baseline  Psychiatric:         Mood and Affect: Mood normal          Behavior: Behavior normal          Thought Content:  Thought content normal          Judgment: Judgment normal          Pertinent Laboratory/Diagnostic Studies:  Lab Results   Component Value Date    GLUCOSE 115 02/12/2023    BUN 10 02/14/2023    CREATININE 0 33 (L) 02/14/2023    CALCIUM 8 5 02/14/2023     06/26/2017    K 3 4 (L) 02/14/2023    CO2 33 (H) 02/14/2023     02/14/2023     Lab Results   Component Value Date    ALT 10 (L) 02/13/2023    AST 16 02/13/2023    ALKPHOS 89 02/13/2023    BILITOT 0 7 06/26/2017       Lab Results   Component Value Date    WBC 7 09 02/14/2023    HGB 10 9 (L) 02/14/2023    HCT 32 1 (L) 02/14/2023     (H) 02/14/2023     02/14/2023       Lab Results   Component Value Date    TSH 0 14 (L) 02/16/2022       Lab Results   Component Value Date    CHOL 220 (H) 06/26/2017     Lab Results   Component Value Date    TRIG 131 02/16/2022     Lab Results   Component Value Date    HDL 70 02/16/2022     Lab Results   Component Value Date    LDLCALC 84 02/16/2022     No results found for: HGBA1C    Results for orders placed or performed during the hospital encounter of 02/12/23   CBC and differential   Result Value Ref Range    WBC 15 79 (H) 4 31 - 10 16 Thousand/uL    RBC 4 01 3 81 - 5 12 Million/uL    Hemoglobin 13 9 11 5 - 15 4 g/dL    Hematocrit 41 1 34 8 - 46 1 %     (H) 82 - 98 fL    MCH 34 7 (H) 26 8 - 34 3 pg    MCHC 33 8 31 4 - 37 4 g/dL    RDW 14 3 11 6 - 15 1 %    MPV 9 5 8 9 - 12 7 fL    Platelets 615 326 - 849 Thousands/uL    nRBC 0 /100 WBCs    Neutrophils Relative 81 (H) 43 - 75 %    Immat GRANS % 1 0 - 2 %    Lymphocytes Relative 12 (L) 14 - 44 %    Monocytes Relative 6 4 - 12 %    Eosinophils Relative 0 0 - 6 %    Basophils Relative 0 0 - 1 %    Neutrophils Absolute 12 83 (H) 1 85 - 7 62 Thousands/µL    Immature Grans Absolute 0 08 0 00 - 0 20 Thousand/uL    Lymphocytes Absolute 1 82 0 60 - 4 47 Thousands/µL    Monocytes Absolute 0 97 0 17 - 1 22 Thousand/µL    Eosinophils Absolute 0 05 0 00 - 0 61 Thousand/µL    Basophils Absolute 0 04 0 00 - 0 10 Thousands/µL   Basic metabolic panel   Result Value Ref Range    Sodium 138 135 - 147 mmol/L    Potassium 3 8 3 5 - 5 3 mmol/L    Chloride 104 96 - 108 mmol/L    CO2 30 21 - 32 mmol/L    ANION GAP 4 4 - 13 mmol/L    BUN 7 5 - 25 mg/dL    Creatinine 0 47 (L) 0 60 - 1 30 mg/dL    Glucose 113 65 - 140 mg/dL    Calcium 8 8 8 3 - 10 1 mg/dL    eGFR 96 ml/min/1 73sq m   CK (with reflex to MB)   Result Value Ref Range    Total CK 57 26 - 192 U/L   HS Troponin 0hr (reflex protocol)   Result Value Ref Range    hs TnI 0hr <2 "Refer to ACS Flowchart"- see link ng/L   Hemoglobin and hematocrit, blood   Result Value Ref Range    Hemoglobin 14 0 11 5 - 15 4 g/dL    Hematocrit 41 0 34 8 - 46 1 %   Comprehensive metabolic panel   Result Value Ref Range    Sodium 137 135 - 147 mmol/L    Potassium 3 7 3 5 - 5 3 mmol/L    Chloride 104 96 - 108 mmol/L    CO2 30 21 - 32 mmol/L    ANION GAP 3 (L) 4 - 13 mmol/L    BUN 9 5 - 25 mg/dL    Creatinine 0 33 (L) 0 60 - 1 30 mg/dL    Glucose 86 65 - 140 mg/dL    Calcium 8 3 8 3 - 10 1 mg/dL    Corrected Calcium 9 4 8 3 - 10 1 mg/dL    AST 16 5 - 45 U/L    ALT 10 (L) 12 - 78 U/L    Alkaline Phosphatase 89 46 - 116 U/L    Total Protein 5 4 (L) 6 4 - 8 4 g/dL    Albumin 2 6 (L) 3 5 - 5 0 g/dL    Total Bilirubin 1 08 (H) 0 20 - 1 00 mg/dL    eGFR 108 ml/min/1 73sq m   Basic metabolic panel   Result Value Ref Range    Sodium 138 135 - 147 mmol/L    Potassium 3 4 (L) 3 5 - 5 3 mmol/L    Chloride 102 96 - 108 mmol/L    CO2 33 (H) 21 - 32 mmol/L    ANION GAP 3 (L) 4 - 13 mmol/L    BUN 10 5 - 25 mg/dL    Creatinine 0 33 (L) 0 60 - 1 30 mg/dL    Glucose 85 65 - 140 mg/dL    Calcium 8 5 8 3 - 10 1 mg/dL    eGFR 108 ml/min/1 73sq m   CBC and differential   Result Value Ref Range    WBC 7 09 4 31 - 10 16 Thousand/uL    RBC 3 12 (L) 3 81 - 5 12 Million/uL    Hemoglobin 10 9 (L) 11 5 - 15 4 g/dL    Hematocrit 32 1 (L) 34 8 - 46 1 %     (H) 82 - 98 fL    MCH 34 9 (H) 26 8 - 34 3 pg    MCHC 34 0 31 4 - 37 4 g/dL    RDW 14 2 11 6 - 15 1 %    MPV 9 8 8 9 - 12 7 fL    Platelets 557 147 - 866 Thousands/uL    nRBC 0 /100 WBCs    Neutrophils Relative 53 43 - 75 %    Immat GRANS % 0 0 - 2 %    Lymphocytes Relative 33 14 - 44 %    Monocytes Relative 13 (H) 4 - 12 %    Eosinophils Relative 1 0 - 6 %    Basophils Relative 0 0 - 1 %    Neutrophils Absolute 3 74 1 85 - 7 62 Thousands/µL    Immature Grans Absolute 0 02 0 00 - 0 20 Thousand/uL    Lymphocytes Absolute 2 31 0 60 - 4 47 Thousands/µL    Monocytes Absolute 0 89 0 17 - 1 22 Thousand/µL    Eosinophils Absolute 0 10 0 00 - 0 61 Thousand/µL    Basophils Absolute 0 03 0 00 - 0 10 Thousands/µL   ECG 12 lead   Result Value Ref Range    Ventricular Rate 73 BPM    Atrial Rate 73 BPM    CT Interval 136 ms    QRSD Interval 92 ms    QT Interval 366 ms    QTC Interval 403 ms    P [place] 77 degrees    QRS Axis 77 degrees    T Wave Axis -29 degrees   ECG 12 lead   Result Value Ref Range    Ventricular Rate 79 BPM    Atrial Rate 65 BPM    CT Interval 142 ms    QRSD Interval 86 ms    QT Interval 518 ms    QTC Interval 593 ms    P Axis  degrees    QRS Axis 55 degrees    T Wave Axis 48 degrees   ECG 12 lead   Result Value Ref Range    Ventricular Rate 68 BPM    Atrial Rate 68 BPM    CT Interval 146 ms    QRSD Interval 84 ms    QT Interval 428 ms    QTC Interval 455 ms    P Axis 26 degrees    QRS Axis 56 degrees    T Wave Axis 28 degrees   Type and screen   Result Value Ref Range    ABO Grouping AB     Rh Factor Negative     Antibody Screen Negative     Specimen Expiration Date [id number]    POCT Blood Gas (CG8+)   Result Value Ref Range    ph, Romeo ISTAT 7 367 7 300 - 7 400 pCO2, Romeo i-STAT 53 8 (H) 42 0 - 50 0 mm HG    pO2, Romeo i-STAT 24 0 (L) 35 0 - 45 0 mm HG    BE, i-STAT 4 (H) -2 - 3 mmol/L    HCO3, Romeo i-STAT 30 8 (H) 24 0 - 30 0 mmol/L    CO2, i-STAT 32 21 - 32 mmol/L    O2 Sat, i-STAT 38 (L) 60 - 85 %    SODIUM, I-STAT 140 136 - 145 mmol/l    Potassium, i-STAT 3 8 3 5 - 5 3 mmol/L    Calcium, Ionized i-STAT 1 14 1 12 - 1 32 mmol/L    Hct, i-STAT 46 34 8 - 46 1 %    Hgb, i-STAT 15 6 (H) 11 5 - 15 4 g/dl    Glucose, i-STAT 115 65 - 140 mg/dl    Specimen Type VENOUS    Green / Yellow tube on hold   Result Value Ref Range    Extra Tube Hold for add-ons  Green / Black tube on hold   Result Value Ref Range    Extra Tube Hold for add-ons  Lavender Top 3 ml on hold   Result Value Ref Range    Extra Tube Hold for add-ons  Grey top tube on hold   Result Value Ref Range    Extra Tube Hold for add-ons  No orders of the defined types were placed in this encounter        ALLERGIES:  No Known Allergies    Current Medications     Current Outpatient Medications   Medication Sig Dispense Refill   • acetaminophen (TYLENOL) 325 mg tablet Take 2 tablets (650 mg total) by mouth every 6 (six) hours as needed for mild pain  0   • ALPRAZolam (XANAX) 1 mg tablet Take 1 mg by mouth 2 (two) times a day as needed for anxiety     • amLODIPine (NORVASC) 5 mg tablet TAKE 1 TABLET (5 MG TOTAL) BY MOUTH DAILY 90 tablet 3   • amLODIPine (NORVASC) 5 mg tablet Take 5 mg by mouth daily     • brimonidine tartrate 0 2 % ophthalmic solution INSTILL 1 DROP BOTH EYES 3 TIMES A DAY     • citalopram (CeleXA) 10 mg tablet TAKE 1 TABLET (10 MG TOTAL) BY MOUTH DAILY 90 tablet 3   • citalopram (CeleXA) 10 mg tablet Take 10 mg by mouth daily     • hydrocortisone (ANUSOL-HC) 2 5 % rectal cream Apply topically 2 (two) times a day 28 g 5   • levothyroxine 50 mcg tablet TAKE 1 TABLET (50 MCG TOTAL) BY MOUTH DAILY 90 tablet 1   • ALPRAZolam (XANAX) 1 mg tablet TAKE 1 TABLET (1 MG TOTAL) BY MOUTH 2 (TWO) TIMES A DAY AS NEEDED (AS NEEDED) (Patient not taking: Reported on 2/20/2023) 180 tablet 0   • bimatoprost (LUMIGAN) 0 01 % ophthalmic drops Apply 1 drop to eye Daily (Patient not taking: Reported on 9/13/2022)     • brimonidine (ALPHAGAN P) 0 1 % Apply 1 drop to eye every 12 (twelve) hours (Patient not taking: Reported on 9/13/2022)     • levETIRAcetam (KEPPRA) 500 mg tablet Take 1 tablet (500 mg total) by mouth 2 (two) times a day for 6 days (Patient not taking: Reported on 2/20/2023) 12 tablet 0   • levothyroxine 50 mcg tablet Take 50 mcg by mouth daily (Patient not taking: Reported on 2/20/2023)       No current facility-administered medications for this visit           Health Maintenance     Health Maintenance   Topic Date Due   • Hepatitis C Screening  Never done   • SLP PLAN OF CARE  Never done   • Hepatitis A Vaccine (1 of 2 - Risk 2-dose series) Never done   • BMI: Followup Plan  Never done   • Lung Cancer Screening  Never done   • Hepatitis B Vaccine (1 of 3 - Risk 3-dose series) Never done   • Breast Cancer Screening: Mammogram  10/29/2021   • COVID-19 Vaccine (4 - Booster for Pfizer series) 04/09/2022   • Depression Remission PHQ  08/20/2023   • Fall Risk  09/13/2023   • Urinary Incontinence Screening  09/13/2023   • Medicare Annual Wellness Visit (AWV)  09/13/2023   • BMI: Adult  02/20/2024   • Osteoporosis Screening  Completed   • Pneumococcal Vaccine: 65+ Years  Completed   • HIB Vaccine  Aged Out   • IPV Vaccine  Aged Out   • Meningococcal ACWY Vaccine  Aged Out   • HPV Vaccine  Aged Out   • Colorectal Cancer Screening  Discontinued   • Influenza Vaccine  Discontinued     Immunization History   Administered Date(s) Administered   • COVID-19 PFIZER VACCINE 0 3 ML IM 05/17/2021, 06/15/2021   • COVID-19 Pfizer vac (Ozzy-sucrose, gray cap) 12 yr+ IM 02/12/2022   • Influenza, seasonal, injectable 09/22/2016   • Pneumococcal Conjugate 13-Valent 05/17/2019   • Pneumococcal Polysaccharide PPV23 06/12/2017   • Tdap 02/12/2023   • Zoster 01/04/2016   • Zoster Vaccine Recombinant 76/25/7059       Rubén Ng MD

## 2023-02-20 NOTE — ASSESSMENT & PLAN NOTE
Subdural hematoma    Patient will follow-up with neurologist early next month and have subsequent CAT scan of head on February 28, 2023 for further evaluation and monitoring of prior subdural hematoma

## 2023-02-21 ENCOUNTER — TELEPHONE (OUTPATIENT)
Dept: FAMILY MEDICINE CLINIC | Facility: CLINIC | Age: 78
End: 2023-02-21

## 2023-02-27 ENCOUNTER — TELEPHONE (OUTPATIENT)
Dept: NEUROSURGERY | Facility: CLINIC | Age: 78
End: 2023-02-27

## 2023-02-27 NOTE — TELEPHONE ENCOUNTER
Received a communication from [place] reporting the patient revealed to her that she has been having a lot of falls over the last week  Patient with history of traumatic SDH  Contacted Karli to discuss  She denies head strike, changes in mental status, slurred speech, trouble walking, headaches  She does not believe she is losing consciousness but can not explain what is resulting in fall  She believes her legs may be giving out  She sounds coherent and clear on the line during our conversation  She is not taking blood thinning medications  Directed her to contact her PCP to advise of these episodes and/or report to the ED with changes in mental status, visual disturbance, slurred speech, etc      Confirmed CT and OV date, time, and location  She was appreciative of the call

## 2023-02-28 ENCOUNTER — HOSPITAL ENCOUNTER (OUTPATIENT)
Dept: RADIOLOGY | Age: 78
Discharge: HOME | End: 2023-02-28

## 2023-02-28 ENCOUNTER — TELEPHONE (OUTPATIENT)
Dept: FAMILY MEDICINE CLINIC | Facility: CLINIC | Age: 78
End: 2023-02-28

## 2023-02-28 DIAGNOSIS — S06.5XAA SDH (SUBDURAL HEMATOMA): ICD-10-CM

## 2023-02-28 NOTE — TELEPHONE ENCOUNTER
Keppra would only be for prevention of seizures and would not necessarily help her current symptoms  If she is continuing with the symptoms I would agree with neurosurgery suggestion to be seen in the emergency room for further evaluation    If no new significant findings and emergency room visit then she can continue follow-up with neurosurgery scheduled for March 2

## 2023-02-28 NOTE — TELEPHONE ENCOUNTER
Patient has been having slurred speech, dizziness and falling down and she is concerned that she may need to take Evalee Juba? Please call patient to advise on what she should do

## 2023-03-03 ENCOUNTER — OFFICE VISIT (OUTPATIENT)
Dept: NEUROSURGERY | Facility: CLINIC | Age: 78
End: 2023-03-03

## 2023-03-03 VITALS
HEIGHT: 62 IN | BODY MASS INDEX: 17.3 KG/M2 | DIASTOLIC BLOOD PRESSURE: 74 MMHG | RESPIRATION RATE: 16 BRPM | HEART RATE: 80 BPM | TEMPERATURE: 97.3 F | SYSTOLIC BLOOD PRESSURE: 115 MMHG | WEIGHT: 94 LBS

## 2023-03-03 DIAGNOSIS — S06.5XAA SDH (SUBDURAL HEMATOMA): Primary | ICD-10-CM

## 2023-03-03 RX ORDER — LEVETIRACETAM 500 MG/1
500 TABLET ORAL EVERY 12 HOURS SCHEDULED
Qty: 60 TABLET | Refills: 0 | Status: SHIPPED | OUTPATIENT
Start: 2023-03-03 | End: 2023-04-02

## 2023-03-03 RX ORDER — LEVETIRACETAM 500 MG/1
500 TABLET ORAL EVERY 12 HOURS SCHEDULED
Qty: 60 TABLET | Refills: 0 | Status: SHIPPED | OUTPATIENT
Start: 2023-03-03 | End: 2023-03-03

## 2023-03-03 NOTE — ASSESSMENT & PLAN NOTE
Patient seen in outpatient office today for 2 week post hospital follow for L frontal SDH/SAH  · Patient originally presented to the ED on 2/12 after she sustained a fall with injury to her head with a right scalp laceration  Patient with history of alcohol abuse and occasional naproxen use for chronic neck/back pain  · Patient states since her hospitalization she has not taken any naproxen or any other AC/AP medication but Tylenol instead but she has not needed Tylenol  · Both her  states they were unsure if she needed to take her Keppra so she was not taking it for 6 days before she saw her PCP for suture removal, during those 6 days she had multiple " dropping episodes" with no LOC  Her PCP advised her to start Keppra, patient has not had any further episodes since on Keppra  She states she has 2 days worth of Keppra left  · Concern for possible seizure-like activity, spoke with Savanna Martin with neurology  He recommended another month supply of Keppra until seen in the neurology office  Provided patient with this prescription  Imaging:    CT head wo 2/28/23: Resolution of the previously seen left frontal subarachnoid and subdural hemorrhage  No new hemorrhage identified  Stable mild chronic microangiopathic change  Plan:  · Reviewed imaging with patient and  and compared prior imaging  Also reviewed brain anatomy etc  previously seen left frontal subarachnoid hemorrhage and subdural hematoma resolved  · Patient will follow-up with our service as needed or symptoms worsen  · Placed stat referral to neurology for possible seizure like activity  · Patient made aware to seek care sooner if she develops any new or worsening neurological change or if like signs  · Patient made aware to contact neurosurgery with any further questions or concerns

## 2023-03-03 NOTE — PROGRESS NOTES
Neurosurgery Office Note  Mickey Castro 70 y o  female MRN: [id number]      Assessment/Plan     SDH (subdural hematoma)  Patient seen in outpatient office today for 2 week post hospital follow for L frontal SDH/SAH  · Patient originally presented to the ED on 2/12 after she sustained a fall with injury to her head with a right scalp laceration  Patient with history of alcohol abuse and occasional naproxen use for chronic neck/back pain  · Patient states since her hospitalization she has not taken any naproxen or any other AC/AP medication but Tylenol instead but she has not needed Tylenol  · Both her  states they were unsure if she needed to take her Keppra so she was not taking it for 6 days before she saw her PCP for suture removal, during those 6 days she had multiple " dropping episodes" with no LOC  Her PCP advised her to start Keppra, patient has not had any further episodes since on Keppra  She states she has 2 days worth of Keppra left  · Concern for possible seizure-like activity, spoke with [place] with neurology  He recommended another month supply of Keppra until seen in the neurology office  Provided patient with this prescription  Imaging:    CT head wo 2/28/23: Resolution of the previously seen left frontal subarachnoid and subdural hemorrhage  No new hemorrhage identified  Stable mild chronic microangiopathic change  Plan:  · Reviewed imaging with patient and  and compared prior imaging  Also reviewed brain anatomy etc  previously seen left frontal subarachnoid hemorrhage and subdural hematoma resolved  · Patient will follow-up with our service as needed or symptoms worsen  · Placed stat referral to neurology for possible seizure like activity  · Patient made aware to seek care sooner if she develops any new or worsening neurological change or if like signs  · Patient made aware to contact neurosurgery with any further questions or concerns         Diagnoses and all orders for this visit:    SDH (subdural hematoma)  -     Cancel: Ambulatory referral to Neurology; Future  -     Ambulatory referral to Neurology; Future  -     Discontinue: levETIRAcetam (Keppra) 500 mg tablet; Take 1 tablet (500 mg total) by mouth every 12 (twelve) hours  -     levETIRAcetam (Keppra) 500 mg tablet; Take 1 tablet (500 mg total) by mouth every 12 (twelve) hours          I have spent a total time of 30 minutes on 03/03/23 in caring for this patient including Diagnostic results, Risks and benefits of tx options, Instructions for management, Patient and family education, Importance of tx compliance, Impressions, Counseling / Coordination of care, Documenting in the medical record, Reviewing / ordering tests, medicine, procedures  , Obtaining or reviewing history   and Communicating with other healthcare professionals   CHIEF COMPLAINT    Chief Complaint   Patient presents with   • Follow-up     [facility] F/U W/CT HEAD 2/28/23 PER THOM       HISTORY    History of Present Illness     70y o  year old female with past medical history significant for hypothyroidism, hypertension, anxiety, depression, alcohol abuse, and glaucoma  Patient seen in outpatient office today for approximately 2-week post hospital follow-up for left frontal subdural hematoma/subarachnoid hemorrhage  Patient originally presented to the ED on 2/12 after she sustained a fall with injury to her head with a right scalp laceration  History of daily cigarette smoking and alcohol abuse with occasional naproxen use for chronic neck/back pain  Repeat CT head did demonstrate slight worsening of left frontal convexity subdural hematoma on next day repeat CT head was stable  Patient states since he was discharged home she was not taking her Keppra  She states this was for 6 days and she was having multiple episodes of "dropping episodes" with no LOC    She reached out to her PCP for suture removal   And was told to restarted her Keppra per PCP and has not had any further episodes  Patient states she only has 2 days left of Keppra  She states she continues to drink alcohol, she states she drinks about half a glass of wine every other day, spoke with patient about the importance of alcohol cessation  She denies any headaches, dizziness, blurry vision, chest pain, shortness of breath, abdominal pain, nausea, vomiting, diarrhea, no problems with bowel or bladder, no new weakness or numbness/tingling  Patient has ongoing left upper extremity weakness  She denies any recent falls or traumas and states her balance is off at times  Spoke with Cecelia Adams MD with neurology  Reviewed case with him personally  Patient will be out of Keppra in 2 days  He stated this is very unusual seizure type to develop after a subarachnoid hemorrhage but he recommended I refill patient's Keppra prescription for a month and they will try to get her in the office  HPI    See Discussion    REVIEW OF SYSTEMS    Review of Systems   Constitutional: Positive for chills and fatigue  HENT: Negative  Eyes: Negative  Respiratory: Negative  Cardiovascular: Negative  Gastrointestinal: Positive for diarrhea (one episode)  Endocrine: Positive for cold intolerance  Genitourinary: Negative  Musculoskeletal: Positive for arthralgias (neck), gait problem (was falling associated with passing out, no recent falls since taking Keppra, no assistant devices) and neck pain  Skin: Negative  Allergic/Immunologic: Negative  Neurological: Positive for syncope (episodes since being discharged from hospital, was off of keppra, restarted and hasn't had an episode since)  Negative for dizziness, tremors, seizures, speech difficulty, weakness, numbness and headaches  Hematological: Bruises/bleeds easily  Psychiatric/Behavioral: Negative for confusion, decreased concentration and sleep disturbance  The patient is nervous/anxious           Sometimes forgettful     ROS reviewed with patient and agree and changes are made as needed    Meds/Allergies     Current Outpatient Medications   Medication Sig Dispense Refill   • ALPRAZolam (XANAX) 1 mg tablet TAKE 1 TABLET (1 MG TOTAL) BY MOUTH 2 (TWO) TIMES A DAY AS NEEDED (AS NEEDED) 180 tablet 0   • amLODIPine (NORVASC) 5 mg tablet TAKE 1 TABLET (5 MG TOTAL) BY MOUTH DAILY 90 tablet 3   • citalopram (CeleXA) 10 mg tablet TAKE 1 TABLET (10 MG TOTAL) BY MOUTH DAILY 90 tablet 3   • levETIRAcetam (KEPPRA) 500 mg tablet Take 1 tablet (500 mg total) by mouth 2 (two) times a day for 6 days 12 tablet 0   • levETIRAcetam (Keppra) 500 mg tablet Take 1 tablet (500 mg total) by mouth every 12 (twelve) hours 60 tablet 0   • levothyroxine 50 mcg tablet TAKE 1 TABLET (50 MCG TOTAL) BY MOUTH DAILY 90 tablet 1   • acetaminophen (TYLENOL) 325 mg tablet Take 2 tablets (650 mg total) by mouth every 6 (six) hours as needed for mild pain (Patient not taking: Reported on 3/3/2023)  0   • ALPRAZolam (XANAX) 1 mg tablet Take 1 mg by mouth 2 (two) times a day as needed for anxiety (Patient not taking: Reported on 3/3/2023)     • amLODIPine (NORVASC) 5 mg tablet Take 5 mg by mouth daily (Patient not taking: Reported on 3/3/2023)     • bimatoprost (LUMIGAN) 0 01 % ophthalmic drops Apply 1 drop to eye Daily (Patient not taking: Reported on 9/13/2022)     • brimonidine (ALPHAGAN P) 0 1 % Apply 1 drop to eye every 12 (twelve) hours (Patient not taking: Reported on 9/13/2022)     • brimonidine tartrate 0 2 % ophthalmic solution INSTILL 1 DROP BOTH EYES 3 TIMES A DAY (Patient not taking: Reported on 3/3/2023)     • citalopram (CeleXA) 10 mg tablet Take 10 mg by mouth daily (Patient not taking: Reported on 3/3/2023)     • hydrocortisone (ANUSOL-HC) 2 5 % rectal cream Apply topically 2 (two) times a day (Patient not taking: Reported on 3/3/2023) 28 g 5   • levothyroxine 50 mcg tablet Take 50 mcg by mouth daily (Patient not taking: Reported on 2/20/2023) No current facility-administered medications for this visit  No Known Allergies    PAST HISTORY    Past Medical History:   Diagnosis Date   • Anxiety     Last Assessed: 6/12/2017    • Depression    • FH: colonic polyps    • Glaucoma    • Hypertension     Last Assessed: 12/12/2017   • Hypothyroidism     Last Assessed: 6/12/2017   • Menopause    • Normal vaginal delivery    • Osteopenia    • Pap smear abnormality of cervix with ASCUS favoring benign    • Telangiectasis        Past Surgical History:   Procedure Laterality Date   • CATARACT EXTRACTION     • COLONOSCOPY      complete   • DILATION AND CURETTAGE OF UTERUS  01/10/1978   • HEMORROIDECTOMY      x2   • LAPAROSCOPY      Diagnostic    • TUBAL LIGATION         Social History     Tobacco Use   • Smoking status: Every Day     Packs/day: 1 00     Years: 35 00     Pack years: 35 00     Types: Cigarettes   • Smokeless tobacco: Never   Vaping Use   • Vaping Use: Never used   Substance Use Topics   • Alcohol use: Yes     Alcohol/week: 21 0 standard drinks     Types: 21 Glasses of wine per week     Comment: no more than 3 glasses of wine per day   • Drug use: No       Family History   Problem Relation Age of Onset   • Heart disease Mother         Cardiac Disorder    • Colonic polyp Mother    • Hypertension Mother    • Heart disease Father         Cardiac Dsiorder    • Hypertension Father    • Lung cancer Father    • Breast cancer Sister 76   • Stomach cancer Sister    • Colonic polyp Brother    • Lung cancer Brother    • Hepatitis Daughter         Alcoholic   • Arthritis Family    • Lung cancer Family    • No Known Problems Maternal Grandmother    • No Known Problems Maternal Grandfather    • No Known Problems Paternal Grandmother    • No Known Problems Paternal Grandfather    • No Known Problems Son    • Esophageal cancer Sister 64         Above history personally reviewed         EXAM    Vitals:Blood pressure 115/74, pulse 80, temperature (!) 97 3 °F (36 3 °C), resp  rate 16, height 5' 2" (1 575 m), weight 42 6 kg (94 lb)  ,Body mass index is 17 19 kg/m²  Physical Exam  Vitals reviewed  Constitutional:       General: She is awake  She is not in acute distress  Appearance: Normal appearance  She is not ill-appearing  HENT:      Head: Normocephalic and atraumatic  Eyes:      Extraocular Movements: Extraocular movements intact and EOM normal       Conjunctiva/sclera: Conjunctivae normal       Pupils: Pupils are equal, round, and reactive to light  Cardiovascular:      Rate and Rhythm: Normal rate  Pulmonary:      Effort: Pulmonary effort is normal  No respiratory distress  Chest:      Chest wall: No tenderness  Abdominal:      General: There is no distension  Palpations: Abdomen is soft  Tenderness: There is no abdominal tenderness  Musculoskeletal:         General: Normal range of motion  Cervical back: Normal range of motion and neck supple  No tenderness  Skin:     General: Skin is warm and dry  Neurological:      Mental Status: She is alert and oriented to person, place, and time  Coordination: Finger-Nose-Finger Test normal       Deep Tendon Reflexes:      Reflex Scores:       Bicep reflexes are 2+ on the right side and 2+ on the left side  Patellar reflexes are 2+ on the right side and 2+ on the left side  Psychiatric:         Attention and Perception: Attention and perception normal          Mood and Affect: Mood and affect normal          Speech: Speech normal          Behavior: Behavior normal  Behavior is cooperative  Thought Content: Thought content normal          Cognition and Memory: Cognition and memory normal          Judgment: Judgment normal          Neurologic Exam     Mental Status   Oriented to person, place, and time  Follows 2 step commands  Attention: normal  Concentration: normal    Speech: speech is normal   Level of consciousness: alert  Knowledge: good   Able to perform simple calculations  Able to name object  Normal comprehension  Cranial Nerves     CN III, IV, VI   Pupils are equal, round, and reactive to light  Extraocular motions are normal    CN III: no CN III palsy  CN VI: no CN VI palsy  Nystagmus: none   Diplopia: none  Conjugate gaze: present    CN V   Facial sensation intact  CN VII   Facial expression full, symmetric  CN VIII   CN VIII normal    Hearing: intact    CN IX, X   CN IX normal      CN XI   CN XI normal      CN XII   CN XII normal      Motor Exam   Muscle bulk: normal  Overall muscle tone: normal  Right arm pronator drift: absent  Left arm pronator drift: absent    Strength   Strength 5/5 except as noted  L bicep/tricep/deltoid 4/5     Sensory Exam   Light touch normal    Proprioception normal    JPS and DST intact     Gait, Coordination, and Reflexes     Gait  Gait: (slowed walk)    Coordination   Finger to nose coordination: normal    Tremor   Resting tremor: absent  Intention tremor: absent  Action tremor: absent    Reflexes   Right biceps: 2+  Left biceps: 2+  Right patellar: 2+  Left patellar: 2+  Right Dias: absent  Left Dias: absent  Right ankle clonus: absent  Left ankle clonus: absent        MEDICAL DECISION MAKING    Imaging Studies:     CT head wo contrast    Result Date: 3/2/2023  Narrative: CT BRAIN - WITHOUT CONTRAST INDICATION:   S06  5XAA: Traumatic subdural hemorrhage with loss of consciousness status unknown, initial encounter  COMPARISON:  2/14/2023 TECHNIQUE:  CT examination of the brain was performed  In addition to axial images, sagittal and coronal 2D reformatted images were created and submitted for interpretation  Radiation dose length product (DLP) for this visit:  838 mGy-cm   This examination, like all CT scans performed in the [facility], was performed utilizing techniques to minimize radiation dose exposure, including the use of iterative reconstruction and automated exposure control    IMAGE QUALITY:  Diagnostic  FINDINGS: PARENCHYMA: Decreased attenuation is noted in periventricular and subcortical white matter demonstrating an appearance that is statistically most likely to represent mild microangiopathic change; this appearance is similar when compared to most recent prior examination  No CT signs of acute infarction  No intracranial mass, mass effect or midline shift  No intraparenchymal hemorrhage identified  VENTRICLES AND EXTRA-AXIAL SPACES:  The previously identified subdural hemorrhage and mild adjacent subarachnoid hemorrhage has resolved  No new extra-axial hemorrhage identified  Ventricles are unchanged in size  VISUALIZED ORBITS: Normal visualized orbits  PARANASAL SINUSES: Normal visualized paranasal sinuses  CALVARIUM AND EXTRACRANIAL SOFT TISSUES:  Normal      Impression: Resolution of the previously seen left frontal subarachnoid and subdural hemorrhage  No new hemorrhage identified  Stable mild chronic microangiopathic change  Workstation performed: [id number]     CT head wo contrast    Result Date: 2/14/2023  Narrative: CT BRAIN - WITHOUT CONTRAST INDICATION:   Subdural hematoma f/u L sided SDH  COMPARISON:  CT from the day before  TECHNIQUE:  CT examination of the brain was performed  In addition to axial images, sagittal and coronal 2D reformatted images were created and submitted for interpretation  Radiation dose length product (DLP) for this visit:  816 87 mGy-cm   This examination, like all CT scans performed in the [facility], was performed utilizing techniques to minimize radiation dose exposure, including the use of iterative  reconstruction and automated exposure control  IMAGE QUALITY:  Diagnostic  FINDINGS: PARENCHYMA:  There is a stable small left frontal convexity subdural hematoma measuring 6 mm in depth    There are small foci of regional subarachnoid hemorrhage again noted in the left frontal lobe as well as underlying contusion with small areas of low attenuation  No new areas of acute intracranial hemorrhage are identified  No midline shift  VENTRICLES AND EXTRA-AXIAL SPACES:  Normal for the patient's age  VISUALIZED ORBITS: Normal visualized orbits  PARANASAL SINUSES: Normal visualized paranasal sinuses  CALVARIUM AND EXTRACRANIAL SOFT TISSUES:  Stable scalp contusion  No depressed calvarial fractures  Impression: No significant interval change since prior examination  Stable small left frontal convexity subdural hematoma with regional foci of subarachnoid hemorrhage and underlying contusion, unchanged  No new areas of acute intracranial hemorrhage identified  Workstation performed: [id number]     CT head wo contrast    Result Date: 2/13/2023  Narrative: CT BRAIN - WITHOUT CONTRAST INDICATION:   Subdural hematoma SDH progression  COMPARISON:  None  TECHNIQUE:  CT examination of the brain was performed  In addition to axial images, sagittal and coronal 2D reformatted images were created and submitted for interpretation  Radiation dose length product (DLP) for this visit:  804 48 mGy-cm   This examination, like all CT scans performed in the [facility], was performed utilizing techniques to minimize radiation dose exposure, including the use of iterative  reconstruction and automated exposure control  IMAGE QUALITY:  Diagnostic  FINDINGS: PARENCHYMA:  The small amount of extra-axial hemorrhage along the left frontal convexity, which appears to cross the coronal suture contrecoup from the scalp contusion  This measures up to 6 mm depth, slightly increased in size since the prior examination where it measured 5 mm  There is regional subarachnoid hemorrhage, stable  There is also suspected contusion in the left frontal lobe on series 2, image 20  There is no midline shift  There is no CT evidence for a large acute vascular distribution infarct  There is age-related involutional change    There is scattered chronic microvascular ischemic change  VENTRICLES AND EXTRA-AXIAL SPACES:  Normal for the patient's age  VISUALIZED ORBITS: Normal visualized orbits  PARANASAL SINUSES: Normal visualized paranasal sinuses  CALVARIUM AND EXTRACRANIAL SOFT TISSUES:  There is right parieto-occipital scalp hematoma  There are no depressed calvarial fractures  Impression: Slight interval increase in size of the left frontal convexity subdural hematoma  Small volume regional subarachnoid hemorrhage is again noted with a suspected small associated contusion  The study was marked in [facility] for immediate notification  Workstation performed: [id number]     TRAUMA - CT head wo contrast    Result Date: 2/12/2023  Narrative: CT BRAIN - WITHOUT CONTRAST INDICATION:   TRAUMA  Status post fall with posterior head laceration  COMPARISON:  None  TECHNIQUE:  CT examination of the brain was performed  In addition to axial images, sagittal and coronal 2D reformatted images were created and submitted for interpretation  Radiation dose length product (DLP) for this visit:  873 08 mGy-cm   This examination, like all CT scans performed in the [facility], was performed utilizing techniques to minimize radiation dose exposure, including the use of iterative  reconstruction and automated exposure control  IMAGE QUALITY:  Diagnostic  FINDINGS: PARENCHYMA:  Decreased attenuation is noted in periventricular and subcortical white matter,  statistically most likely representing mild microangiopathic changes  No evidence of acute infarction  No mass effect or midline shift  No acute parenchymal hemorrhage  VENTRICLES AND EXTRA-AXIAL SPACES: There is a small amount of subarachnoid hemorrhage in the left frontal lobe  There is a small adjacent focal subdural hematoma measuring up to 5 mm in thickness  The ventricular system is within normal limits for the patient's age   VISUALIZED ORBITS AND PARANASAL SINUSES:  The patient is status post bilateral cataract surgery  The visualized orbits are otherwise unremarkable  Clear paranasal sinuses  CALVARIUM AND EXTRACRANIAL SOFT TISSUES: There is a scalp hematoma and laceration in the right occipital region  No calvarial fracture  Impression: Small amount of subarachnoid hemorrhage in the left frontal lobe with an adjacent small focal subdural hematoma measuring 5 mm in thickness  No mass effect or midline shift  Scalp hematoma and laceration and right occipital region, however no evidence of calvarial fracture  I personally discussed this study with GLENIS CONNOLLY on 2/12/2023 at 8:12 PM   Workstation performed: [id number]     TRAUMA - CT spine cervical wo contrast    Result Date: 2/12/2023  Narrative: CT CERVICAL SPINE - WITHOUT CONTRAST INDICATION:   TRAUMA  COMPARISON:  None  TECHNIQUE:  CT examination of the cervical spine was performed without intravenous contrast   Contiguous axial images were obtained  Sagittal and coronal reconstructions were performed  Radiation dose length product (DLP) for this visit:  192 58 mGy-cm   This examination, like all CT scans performed in the [facility], was performed utilizing techniques to minimize radiation dose exposure, including the use of iterative  reconstruction and automated exposure control  IMAGE QUALITY:  Diagnostic  FINDINGS: ALIGNMENT:  Normal alignment of the cervical spine  No subluxation  VERTEBRAE:  No fracture  DEGENERATIVE CHANGES:  Moderate multilevel cervical degenerative changes are noted  No critical central canal stenosis  PREVERTEBRAL AND PARASPINAL SOFT TISSUES: Unremarkable THORACIC INLET:  Minimal scarring at the left apex  Impression: No cervical spine fracture or traumatic malalignment  Moderate multilevel degenerative changes   I personally discussed this study with GLENIS CONNOLLY on 2/12/2023 at 8:12 PM  Workstation performed: [id number]     XR chest 1 view    Result Date: 2/13/2023  Narrative: TRAUMA SERIES INDICATION:  TRAUMA  COMPARISON:  None  VIEWS:  XR TRAUMA MULTIPLE Images: 2  FINDINGS: The lungs are clear  No pleural effusions  No evidence of pneumothorax  Cardiac silhouette not accurately accessed on this projection  Impression: No acute pulmonary pathology  Workstation performed: [id number]     XR Trauma multiple ([facility] trauma bay ONLY)    Result Date: 2/12/2023  Narrative: TRAUMA SERIES INDICATION:  TRAUMA  COMPARISON:  None  VIEWS:  XR TRAUMA MULTIPLE Images: 2  FINDINGS: The lungs are clear  No pleural effusions  No evidence of pneumothorax  Cardiac silhouette not accurately accessed on this projection  Impression: No acute pulmonary pathology  Workstation performed: [id number]       I have personally reviewed pertinent reports     and I have personally reviewed pertinent films in PACS

## 2023-03-03 NOTE — PATIENT INSTRUCTIONS
Patient will follow-up as needed or if symptoms worsen    Please stat consult to neurology for possible seizure-like activity      Sent prescription for keppra to patients pharmacy

## 2023-03-11 ENCOUNTER — NURSE TRIAGE (OUTPATIENT)
Dept: OTHER | Facility: OTHER | Age: 78
End: 2023-03-11

## 2023-03-11 NOTE — TELEPHONE ENCOUNTER
Informed patient per OV note she was advised to take one Keppra 500mg tablet in the morning and at night until her follow up appointment due to concern for possible seizure like activity  Patient states no one contacted her to schedule follow up appointment  Verbalized understanding for continuation of medication  Regarding: medication instuction clarification  ----- Message from Carina Richardson sent at 3/11/2023  1:32 PM EST -----  Pt called, " I was given a medication for my subdural hematoma to take for 6 days  Since my sx did not go away, I was given a another script, but this time it says 60 tabs, I am not sure how I should take them since I was just instructed to taken them for 6 days  "

## 2023-03-11 NOTE — TELEPHONE ENCOUNTER
Reason for Disposition  • Caller has medicine question only, adult not sick, AND triager answers question    Protocols used: MEDICATION QUESTION CALL-ADULT-

## 2023-03-13 DIAGNOSIS — F41.9 ANXIETY: ICD-10-CM

## 2023-03-13 DIAGNOSIS — E03.9 HYPOTHYROIDISM, UNSPECIFIED TYPE: ICD-10-CM

## 2023-03-15 DIAGNOSIS — F41.9 ANXIETY: ICD-10-CM

## 2023-03-15 RX ORDER — ALPRAZOLAM 1 MG/1
1 TABLET ORAL 2 TIMES DAILY PRN
Qty: 180 TABLET | Refills: 0 | Status: SHIPPED | OUTPATIENT
Start: 2023-03-15

## 2023-03-15 RX ORDER — ALPRAZOLAM 1 MG/1
1 TABLET ORAL 2 TIMES DAILY PRN
Qty: 180 TABLET | Refills: 0 | Status: SHIPPED | OUTPATIENT
Start: 2023-03-15 | End: 2023-03-15 | Stop reason: SDUPTHER

## 2023-03-15 RX ORDER — LEVOTHYROXINE SODIUM 0.05 MG/1
50 TABLET ORAL DAILY
Qty: 90 TABLET | Refills: 1 | Status: SHIPPED | OUTPATIENT
Start: 2023-03-15

## 2023-03-15 NOTE — TELEPHONE ENCOUNTER
This was sent to the wrong pharmacy  Patient no longer uses [facility] and now only uses [facility] in [place], updated on Chart

## 2023-03-31 ENCOUNTER — TELEPHONE (OUTPATIENT)
Dept: NEUROLOGY | Facility: CLINIC | Age: 78
End: 2023-03-31

## 2023-04-14 PROBLEM — S01.01XA SCALP LACERATION: Status: RESOLVED | Noted: 2023-02-12 | Resolved: 2023-04-14

## 2023-05-12 ENCOUNTER — TELEPHONE (OUTPATIENT)
Dept: NEUROSURGERY | Facility: CLINIC | Age: 78
End: 2023-05-12

## 2023-05-12 NOTE — TELEPHONE ENCOUNTER
"Received call on nurseline from patient stating some days she has difficulty walking and some days she is ok  Questioned as to whether \"this appointment was really necessary\"  This RN asked for clarification as to what appointment patient was referring to as she is not scheduled with us  After discussion, patient is referring to Physical therapy that she was referred to by neurology  Advised her that she should follow through with physical therapy if she is having any gait disturbance at all  Patient also asked if it will be covered by her insurance  Explained to patient that She called the clinical line and the nurses have no knowledge of insurance coverage, she would need to call the front office where she would be receiving physical therapy services to inquire about coverage  All questions answered at this time, she was appreciative of the call back    "

## 2023-06-18 ENCOUNTER — TELEPHONE (OUTPATIENT)
Dept: OTHER | Facility: OTHER | Age: 78
End: 2023-06-18

## 2023-06-18 NOTE — TELEPHONE ENCOUNTER
Pt called, requesting a call back from office  at best convenience to see if the office has a sooner appt than her scheduled appt on 08/15 at 12:45pm  Please assist

## 2023-06-19 NOTE — TELEPHONE ENCOUNTER
Called patient to see if we can change appointment, no answer left a voicemail for her to call back

## 2023-06-21 ENCOUNTER — TELEPHONE (OUTPATIENT)
Dept: NEUROLOGY | Facility: CLINIC | Age: 78
End: 2023-06-21

## 2023-06-21 NOTE — TELEPHONE ENCOUNTER
Hello,     Patient has called today requesting a sooner appointment than August as advise on last visit  Patient stated she is not feeling well She is having trouble with her walking and memory she is concerned and needs to be seen sooner  I was able to schedule sooner on 06/28/2023, Terra Pompa 12:45 pm     She is also also asking for a call back to speak with the clinical team     Thank you,     Quynh Gilmore

## 2023-06-22 NOTE — TELEPHONE ENCOUNTER
Call returned to patient  [phone]  No answer  Message left for return call to office if anything further

## 2023-06-23 DIAGNOSIS — F41.9 ANXIETY: Primary | ICD-10-CM

## 2023-06-26 RX ORDER — ALPRAZOLAM 1 MG/1
1 TABLET ORAL 2 TIMES DAILY PRN
Qty: 60 TABLET | Refills: 3 | Status: SHIPPED | OUTPATIENT
Start: 2023-06-26

## 2023-07-21 ENCOUNTER — OFFICE VISIT (OUTPATIENT)
Dept: NEUROLOGY | Facility: CLINIC | Age: 78
End: 2023-07-21
Payer: COMMERCIAL

## 2023-07-21 VITALS
BODY MASS INDEX: 17.17 KG/M2 | WEIGHT: 93.3 LBS | DIASTOLIC BLOOD PRESSURE: 62 MMHG | TEMPERATURE: 97.6 F | HEIGHT: 62 IN | SYSTOLIC BLOOD PRESSURE: 98 MMHG | HEART RATE: 94 BPM | OXYGEN SATURATION: 99 %

## 2023-07-21 DIAGNOSIS — R26.9 NEUROLOGIC GAIT DYSFUNCTION: ICD-10-CM

## 2023-07-21 DIAGNOSIS — F10.10 ALCOHOL ABUSE: ICD-10-CM

## 2023-07-21 DIAGNOSIS — M79.89 SWELLING OF LEFT HAND: Primary | ICD-10-CM
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
## 2023-07-21 PROCEDURE — 99215 OFFICE O/P EST HI 40 MIN: CPT | Performed by: NURSE PRACTITIONER

## 2023-07-21 NOTE — ASSESSMENT & PLAN NOTE
She did recently fall while ambulating with  but was due to her  falling and she fell with him. Her left hand is fairly swollen and ecchymotic. It has been fairly painful. Ongoing for one week. Recommended left hand and wrist X-ray to assess for fracture. Will provide referral to ortho if positive. If negative, follow up with PCP.

## 2023-07-21 NOTE — PROGRESS NOTES
Patient ID: Marcio Franklin is a 70 y.o. female with a PMH of L frontal SDH/SAH, alcohol abuse, and chronic neck/back pain presenting to the [facility] for evaluation of drop attack episodes. Assessment/Plan:    Fall  No further falls related to drop attacks. Stopped levetiracetam due to side effects. No further events. Will continue off of AED therapy. She will call the office with further drop attacks. EEG could be considered at that time. Swelling of left hand  She did recently fall while ambulating with  but was due to her  falling and she fell with him. Her left hand is fairly swollen and ecchymotic. It has been fairly painful. Ongoing for one week. Recommended left hand and wrist X-ray to assess for fracture. Will provide referral to ortho if positive. If negative, follow up with PCP. Neurologic gait dysfunction  Patient continues to have difficulty with her gait. No clear shuffling but there is decreased step height. She reports losing weight and muscle mass as she does not eat much and no longer goes to the gym as she used to prior to the pandemic. There is some concern her gait dysfunction is related to deconditioning as well. 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 No resting tremor. She does have masked facies but no rigidity on exam. She did not start sinemet after her last visit and is not interested at this time. Encouraged following through with PT to gain strength and work on balance as well as eating a more substantial diet with more protein and healthy fats. Recommended blood work be completed that was ordered at last visit. Printed out lab slips for patient. Alcohol abuse  Continues to drink alcohol daily. Reports she drinks about a half bottle of wine per day in the evenings. No interest in stopping at this time. She will Return for 4 months with Dr Oma Erickson. Subjective:  Denilson Schmitz is a 70 y.o. female with a PMH of L frontal SDH/SAH, alcohol abuse, and chronic neck/back pain presenting to the [facility] for evaluation of drop attack episodes. She was last seen in the office on 4/17/23 for initial consultation. While she was brought to the office for evaluation of falls/drop attacks possible related to seizure, it was believed that she had a multifactorial gait disorder and several findings suggestive of parkinsons disease. Her deconditioning, low bulk, and alcohol use likely made her gait even more impaired. Recommended trial of sinemet to see if it was helpful with walking. She was given a referral to PT. Recommended A1C, SPEP, B12. Noted would likely stop keppra at next appointment as this was started empirically while in the hospital for SDH. SAH due to head injury secondary to fall. She was frequently falling after. Recommended 3 month follow up. She did call requesting a sooner appointment on 6/21 due to not feeling well. She reported trouble with her walking and worsening memory. She did reschedule her 6/26 appointment due to hurting her back and was rescheduled for today. The patient presents to her appointment today by herself. She did go to the gym daily prior to the pandemic but no longer goes and has lost a lot of muscle mass. She would be aggreable to go to PT as she did not go to PT as recommended by Dr Oma Erickson. She feels that her memory is stable at this time. She reports that her walking is better than at her last visit. No problems around the house. Sometimes walking longer distances she has trouble. She did not take sinemet. She drinks wine - about a half bottle per day after 4 pm.      She is no longer taking levetiracetam as she was not feeling well on this medication. No further falls or drop attacks.      She reports that she has always had some shakiness but takes alprazolam twice per day for this. This has not changed or worsened. Sometimes she forgets to take the alprazolam and around 3 pm her  will tell her she needs to take it because she is more shaky. She did fall helping her  recently and now has hand swelling and bruising on the left hand (inside and outside of hand and up wrist). She reports that she has not been eating well and losing weight. Notes that her  typically did all of the cooking but is not able to cook now. Offered to give a referral to a dietician but she is not interested at this time and is going to work on improving her diet. Current seizure medications:  - none  Other medications as per Epic. Epilepsy Risk Factors:  1/2 bottle of wine drinking a day  Prior [facility]    Prior AEDs:  None     Prior Evaluation:  - routine EEG: no     -  CT head wo 2/12/2023: Small amount of subarachnoid hemorrhage in the left lateral mid frontal lobe with an adjacent more anterior small focal elliptical subdural hematoma measuring 5 mm in thickness.     Psychiatric History:  Anxiety  Depression     Social History:   Lives Alone: No  Occupation: retired     I reviewed prior neurology notes, most recent labs, as documented in Epic/Service Seeking, and summarized above. Objective:    Blood pressure 98/62, pulse 94, temperature 97.6 °F (36.4 °C), temperature source Temporal, height 5' 2" (1.575 m), weight 42.3 kg (93 lb 4.8 oz), SpO2 99 %. Physical Exam  No apparent distress. Appears malnourished  Mood appropriate for situation  Masked facies. Neurologic Exam  Mental status- alert and oriented to person, place, and time. Speech appropriate for conversation. Good attention and knowledge. Cranial Nerves- PERRL, EOMS normal, facial sensation and muscles symmetric, hearing intact bilaterally to finger rubs, tongue midline, palate rise symmetrical, shoulder shrug symmetrical.    Motor- No pronator drift. Appropriate strength. Moves all extremities freely. Mild intention tremor. No rigidity. Sensory-  Intact distally in all extremities to light touch. DTRs- 2+ and symmetric in all extremities. Gait- Decreased step height, no clear shuffling. Slightly wide based. Good arm swing. Coordination- FNF intact though mild dysmetria of left hand. ROS:    Review of Systems   Constitutional: Negative for appetite change, fatigue and fever. HENT: Negative. Negative for hearing loss, tinnitus, trouble swallowing and voice change. Eyes: Negative. Negative for photophobia, pain and visual disturbance. Respiratory: Negative. Negative for shortness of breath. Cardiovascular: Negative. Negative for palpitations. Gastrointestinal: Negative. Negative for nausea and vomiting. Endocrine: Negative. Negative for cold intolerance. Genitourinary: Negative. Negative for dysuria, frequency and urgency. Musculoskeletal: Positive for gait problem (has improved). Negative for back pain, myalgias and neck pain. Skin: Negative. Negative for rash. Allergic/Immunologic: Negative. Neurological: Negative for dizziness, tremors, seizures, syncope, facial asymmetry, speech difficulty, weakness, light-headedness, numbness and headaches. Hematological: Negative. Does not bruise/bleed easily. Psychiatric/Behavioral: Negative. Negative for confusion, hallucinations and sleep disturbance. All other systems reviewed and are negative. ROS obtained by MA and reviewed by myself. This note may have been created using voice recognition software. There may be unintentional errors such as grammatical errors, spelling errors, or pronoun errors.

## 2023-07-21 NOTE — ASSESSMENT & PLAN NOTE
Continues to drink alcohol daily. Reports she drinks about a half bottle of wine per day in the evenings. No interest in stopping at this time.

## 2023-07-21 NOTE — ASSESSMENT & PLAN NOTE
Patient continues to have difficulty with her gait. No clear shuffling but there is decreased step height. She reports losing weight and muscle mass as she does not eat much and no longer goes to the gym as she used to prior to the pandemic. There is some concern her gait dysfunction is related to deconditioning as well. 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 No resting tremor. She does have masked facies but no rigidity on exam. She did not start sinemet after her last visit and is not interested at this time. Encouraged following through with PT to gain strength and work on balance as well as eating a more substantial diet with more protein and healthy fats. Recommended blood work be completed that was ordered at last visit. Printed out lab slips for patient.

## 2023-07-21 NOTE — PATIENT INSTRUCTIONS
- Continue off of medication  - Call the office with any further drop attacks or falls  - Go to physical therapy  - Have blood work completed  - Have hand/wrist xray completed  - Follow up with Dr Ileana Christie in 4 months

## 2023-07-21 NOTE — ASSESSMENT & PLAN NOTE
No further falls related to drop attacks. Stopped levetiracetam due to side effects. No further events. Will continue off of AED therapy. She will call the office with further drop attacks. EEG could be considered at that time.

## 2023-07-25 ENCOUNTER — EVALUATION (OUTPATIENT)
Dept: PHYSICAL THERAPY | Facility: REHABILITATION | Age: 78
End: 2023-07-25
Payer: COMMERCIAL

## 2023-07-25 DIAGNOSIS — R26.9 NEUROLOGIC GAIT DYSFUNCTION: Primary | ICD-10-CM

## 2023-07-25 PROCEDURE — 97110 THERAPEUTIC EXERCISES: CPT

## 2023-07-25 PROCEDURE — 97162 PT EVAL MOD COMPLEX 30 MIN: CPT

## 2023-07-25 NOTE — PROGRESS NOTES
PT Evaluation     Today's date: 2023  Patient name: Junior Gregg  : 1945  MRN: [id number]  Referring provider: Zeus Beckett  Dx:   Encounter Diagnosis     ICD-10-CM    1. Neurologic gait dysfunction  R26.9 Ambulatory Referral to Physical Therapy          Start Time: 1155  Stop Time: 1230  Total time in clinic (min): 35 minutes    Assessment  Assessment details:   Junior Gregg is a pleasant 70 y.o. female who presents with deconditioning and gait dysfunction. Sherian Duty demonstrates high risk for falls and abnormal gait secondary to strength and balance deficits. The impairments listed below are resulting in fear of not being able to keep active and future ill health (and wanting to prevent it). No further referral appears necessary at this time based upon examination results. I expect she will improve in 4-6 weeks. Positive prognostic indicators include positive attitude toward recovery and good understanding of diagnosis and treatment plan options. Negative prognostic indicators include chronicity of symptoms, anxiety, depression and hypertension. Impairments: abnormal gait, abnormal or restricted ROM, activity intolerance, impaired balance, impaired physical strength, lacks appropriate home exercise program and pain with function    Symptom irritability: moderateUnderstanding of Dx/Px/POC: fair   Prognosis: fair    Goals  Short Term Goals (Week 4):  1. Decreased pain by 50%  2. GROC >25%  3. Improve strength by 1/2 measure      Long Term Goals (8 weeks):  1. GROC >50%  2. Patient will exceed FOTO predicted outcome score  3. Patient will be fully independent with HEP by discharge  4. Patient will be able to manage symptoms independently.        Plan  Patient would benefit from: skilled physical therapy  Planned therapy interventions: balance/weight bearing training, activity modification, neuromuscular re-education, patient education, strengthening, stretching, therapeutic activities, therapeutic exercise, home exercise program, graded activity, functional ROM exercises and flexibility  Frequency: 2x week  Duration in visits: 12  Duration in weeks: 6  Treatment plan discussed with: patient        Subjective Evaluation    History of Present Illness  Mechanism of injury: Patient presents to PT for gait dysfunctions and deconditioning. These symptoms started back in February after a fall and subsequent admission to ED where she was diagnosed with SDH/SAH. Patient reports her gait has improved since the initial trauma. Patient reports she doesn't use any assistive devices at home. Patient reports she ambulates without problems around the house, her issues are ambulating in outside.           Objective  Postural Observation   Sitting: kyphotic  Standing: kyphotic    Myotomes  Right Hip Flexion (L1-3): (4-/5)  Left Hip Flexion (L1-3): (4-/5)  R Knee Ext (L3-4): (5/5)  L Knee Ext (L3-4): (5/5)  R DF (L4): (5/5)  L DF (L4): (5/5)  R EDL (L5): (5/5)  L EDL (L5): (5/5)  R PF (S1): (4/5)  L PF (S1): (4/5)  R Knee Flex (S1-2): (5/5)  L Knee Flex (S1-2): (5/5)    Dermatomes  Sensation intact bilaterally    Reflexes  R Patellar Reflex: (2+)  L Patellar Reflex: (2+)  R Achilles Reflex: (2+)  L Achilles Reflex: (2+)      Functional Testing     7/25/23     5xSTS 15.84 sec     TUG 14.89 sec     2-Min Walk Test 336 ft                Precautions: FALLS, hx of L frontal SDH/SAH, hx of drop attacks,       Manuals 7/25                                                                Neuro Re-Ed                                                                                                        Ther Ex             Step Ups Fwd/lat nv            STS nv            Marching nv            [place]dune nv                                      VG nv            Bike nv            Ther Activity                                       Gait Training                                       Modalities

## 2023-10-06 ENCOUNTER — APPOINTMENT (EMERGENCY)
Dept: CT IMAGING | Facility: HOSPITAL | Age: 78
End: 2023-10-06
Payer: COMMERCIAL

## 2023-10-06 ENCOUNTER — HOSPITAL ENCOUNTER (EMERGENCY)
Facility: HOSPITAL | Age: 78
Discharge: HOME | End: 2023-10-06
Attending: EMERGENCY MEDICINE
Payer: COMMERCIAL

## 2023-10-06 VITALS
HEART RATE: 65 BPM | TEMPERATURE: 97.7 F | OXYGEN SATURATION: 93 % | RESPIRATION RATE: 16 BRPM | SYSTOLIC BLOOD PRESSURE: 129 MMHG | DIASTOLIC BLOOD PRESSURE: 77 MMHG

## 2023-10-06 DIAGNOSIS — S01.01XA LACERATION OF SCALP, INITIAL ENCOUNTER: Primary | ICD-10-CM

## 2023-10-06 PROCEDURE — 70450 CT HEAD/BRAIN W/O DYE: CPT

## 2023-10-06 PROCEDURE — G1004 CDSM NDSC: HCPCS

## 2023-10-06 PROCEDURE — 99284 EMERGENCY DEPT VISIT MOD MDM: CPT | Performed by: EMERGENCY MEDICINE

## 2023-10-06 PROCEDURE — 12001 RPR S/N/AX/GEN/TRNK 2.5CM/<: CPT | Performed by: EMERGENCY MEDICINE

## 2023-10-06 PROCEDURE — 99283 EMERGENCY DEPT VISIT LOW MDM: CPT

## 2023-10-06 NOTE — ED NOTES
Son Kym Cui called for an update, please reach out to him for further updates. [phone].      Holly Navarrete RN  10/06/23 [id number]

## 2023-10-06 NOTE — ED ATTENDING ATTESTATION
10/6/2023  I, Fareed Gill MD, saw and evaluated the patient. I have discussed the patient with the resident/non-physician practitioner and agree with the resident's/non-physician practitioner's findings, Plan of Care, and MDM as documented in the resident's/non-physician practitioner's note, except where noted. All available labs and Radiology studies were reviewed. I was present for key portions of any procedure(s) performed by the resident/non-physician practitioner and I was immediately available to provide assistance. At this point I agree with the current assessment done in the Emergency Department. I have conducted an independent evaluation of this patient a history and physical is as follows:     53-year-old female presented for evaluation after she lost balance and fell backwards striking her head while leaving a doctor's appointment today. Any loss of consciousness. No aspirin or anticoagulation. Has a laceration on the right occiput without active bleeding at this time. No focal neurologic deficits on exam.  Plan CT head, laceration repair. She denies any other complaints at this time other than wanting to go home and have a cigarette and a glass of wine. ED Course  ED Course as of 10/07/23 0023   Fri Oct 06, 2023   1849 CT head shows:    No acute intracranial abnormality. Chronic microangiopathic changes.     Right parietal scalp soft tissue hematoma   1849 Resident placed 4 staples to close scalp laceration without complication. Stable for discharge home.     Critical Care Time  Procedures

## 2023-10-06 NOTE — ED PROVIDER NOTES
History  Chief Complaint   Patient presents with   • Fall     Patient arrived via EMS for eval of head injury that occurred about 30 mins ago when the patient tripped and fell back hitting the back of the head. EMS reports about 1 inch lac on the back of the head. Fall was witnessed, occurred outside of a doctors office. Denies LOC/Thinners/Neck Pain/HA/Visual changes. Hx: Subdural about 3 years ago, Depression Anxiety, HTN. Pt is a 70year old female who presents to ED via EMS for evaluation after a fall. Pt states she was at a doctor appointment with her son and was leaving the office when she had some difficulty opening a door, lost her balance and fell back striking her head on the floor. Fall was witnessed, pt did not have LOC. Pt reports no preceding symptoms of dizziness, chest pain, or palpations. She states she was able to get up after the fall and ambulate and did not have any dizziness, vision change, neck or back or extremity pain after the fall. Bleeding from the back of the head was noticed so EMS was called. Pt notes history of balance issues and states she had a brain bleed a several months back from a fall. Pt denies taking blood thinning medications. Prior to Admission Medications   Prescriptions Last Dose Informant Patient Reported? Taking?    ALPRAZolam (XANAX) 1 mg tablet  Self No No   Sig: Take 1 tablet (1 mg total) by mouth 2 (two) times a day as needed (as needed)   ALPRAZolam (XANAX) 1 mg tablet  Self No No   Sig: Take 1 tablet (1 mg total) by mouth 2 (two) times a day as needed for anxiety   acetaminophen (TYLENOL) 325 mg tablet  Self No No   Sig: Take 2 tablets (650 mg total) by mouth every 6 (six) hours as needed for mild pain   amLODIPine (NORVASC) 5 mg tablet  Self Yes No   Sig: Take 5 mg by mouth daily   amLODIPine (NORVASC) 5 mg tablet  Self No No   Sig: Take 1 tablet (5 mg total) by mouth daily   bimatoprost (LUMIGAN) 0.01 % ophthalmic drops  Self Yes No   Sig: Apply 1 drop to eye Daily   Patient not taking: Reported on 9/13/2022   brimonidine (ALPHAGAN P) 0.1 %  Self Yes No   Sig: Apply 1 drop to eye every 12 (twelve) hours   Patient not taking: Reported on 9/13/2022   brimonidine tartrate 0.2 % ophthalmic solution  Self Yes No   Sig: INSTILL 1 DROP BOTH EYES 3 TIMES A DAY   Patient not taking: Reported on 3/3/2023   citalopram (CeleXA) 10 mg tablet  Self Yes No   Sig: Take 10 mg by mouth daily   citalopram (CeleXA) 10 mg tablet  Self No No   Sig: Take 1 tablet (10 mg total) by mouth daily   hydrocortisone (ANUSOL-HC) 2.5 % rectal cream  Self No No   Sig: Apply topically 2 (two) times a day   Patient not taking: Reported on 3/3/2023   levothyroxine 50 mcg tablet  Self Yes No   Sig: Take 50 mcg by mouth daily   Patient not taking: Reported on 7/21/2023   levothyroxine 50 mcg tablet  Self No No   Sig: Take 1 tablet (50 mcg total) by mouth daily   Patient not taking: Reported on 7/21/2023      Facility-Administered Medications: None       Past Medical History:   Diagnosis Date   • Anxiety     Last Assessed: 6/12/2017    • Depression    • FH: colonic polyps    • Glaucoma    • Hypertension     Last Assessed: 12/12/2017   • Hypothyroidism     Last Assessed: 6/12/2017   • Menopause    • Normal vaginal delivery    • Osteopenia    • Pap smear abnormality of cervix with ASCUS favoring benign    • Telangiectasis        Past Surgical History:   Procedure Laterality Date   • CATARACT EXTRACTION     • COLONOSCOPY      complete   • DILATION AND CURETTAGE OF UTERUS  01/10/1978   • HEMORROIDECTOMY      x2   • LAPAROSCOPY      Diagnostic    • TUBAL LIGATION         Family History   Problem Relation Age of Onset   • Heart disease Mother         Cardiac Disorder    • Colonic polyp Mother    • Hypertension Mother    • Heart disease Father         Cardiac Dsiorder    • Hypertension Father    • Lung cancer Father    • Breast cancer Sister 76   • Stomach cancer Sister    • Colonic polyp Brother    • Lung cancer Brother    • Hepatitis Daughter         Alcoholic   • Arthritis Family    • Lung cancer Family    • No Known Problems Maternal Grandmother    • No Known Problems Maternal Grandfather    • No Known Problems Paternal Grandmother    • No Known Problems Paternal Grandfather    • No Known Problems Son    • Esophageal cancer Sister 64     I have reviewed and agree with the history as documented. E-Cigarette/Vaping   • E-Cigarette Use Never User      E-Cigarette/Vaping Substances   • Nicotine No    • THC No    • CBD No    • Flavoring No    • Other No    • Unknown No      Social History     Tobacco Use   • Smoking status: Every Day     Packs/day: 1.00     Years: 35.00     Total pack years: 35.00     Types: Cigarettes   • Smokeless tobacco: Never   Vaping Use   • Vaping Use: Never used   Substance Use Topics   • Alcohol use: Yes     Alcohol/week: 21.0 standard drinks of alcohol     Types: 21 Glasses of wine per week     Comment: no more than 3 glasses of wine per day   • Drug use: No        Review of Systems   Constitutional: Negative for activity change, chills, diaphoresis, fever and unexpected weight change. HENT: Negative for congestion, hearing loss and rhinorrhea. Eyes: Negative for pain and visual disturbance. Respiratory: Negative for chest tightness and shortness of breath. Cardiovascular: Negative for chest pain and palpitations. Gastrointestinal: Negative for abdominal pain, nausea and vomiting. Genitourinary: Negative for difficulty urinating and dysuria. Musculoskeletal: Negative for back pain, gait problem, joint swelling and neck pain. Skin: Positive for wound. Negative for pallor. Allergic/Immunologic: Negative for environmental allergies and food allergies. Neurological: Negative for dizziness, syncope, facial asymmetry, speech difficulty, weakness, light-headedness and headaches. Psychiatric/Behavioral: Negative for confusion.        Physical Exam  ED Triage Vitals [10/06/23 1556]   Temperature Pulse Respirations Blood Pressure SpO2   97.7 °F (36.5 °C) 65 16 129/77 (!) 89 %      Temp Source Heart Rate Source Patient Position - Orthostatic VS BP Location FiO2 (%)   Oral Monitor Sitting Right arm --      Pain Score       4             Orthostatic Vital Signs  Vitals:    10/06/23 1556   BP: 129/77   Pulse: 65   Patient Position - Orthostatic VS: Sitting       Physical Exam  Vitals and nursing note reviewed. Constitutional:       General: She is not in acute distress. Appearance: Normal appearance. She is normal weight. She is not ill-appearing. HENT:      Head: Normocephalic. Laceration present. No raccoon eyes or Lopez's sign. Comments: 2.5cm linear laceration to the right occipital scalp     Mouth/Throat:      Mouth: Mucous membranes are moist.      Pharynx: Oropharynx is clear. Eyes:      Extraocular Movements: Extraocular movements intact. Conjunctiva/sclera: Conjunctivae normal.      Pupils: Pupils are equal, round, and reactive to light. Cardiovascular:      Rate and Rhythm: Normal rate and regular rhythm. Pulses: Normal pulses. Pulmonary:      Effort: Pulmonary effort is normal. No respiratory distress. Abdominal:      General: Abdomen is flat. Palpations: Abdomen is soft. Tenderness: There is no abdominal tenderness. Musculoskeletal:         General: No swelling, tenderness, deformity or signs of injury. Normal range of motion. Cervical back: Normal range of motion and neck supple. No tenderness. Skin:     General: Skin is warm and dry. Neurological:      General: No focal deficit present. Mental Status: She is alert and oriented to person, place, and time. Mental status is at baseline. Cranial Nerves: No cranial nerve deficit. Sensory: No sensory deficit. Gait: Gait normal.   Psychiatric:         Mood and Affect: Mood normal.         Behavior: Behavior normal.         Thought Content:  Thought content normal.         Judgment: Judgment normal.         ED Medications  Medications - No data to display    Diagnostic Studies  Results Reviewed     None                 CT head without contrast   Final Result by Vidhi Ramsey MD (10/06 1817)      No acute intracranial abnormality. Chronic microangiopathic changes. Right parietal scalp soft tissue hematoma            Workstation performed: [id number]               Procedures  Universal Protocol:  Procedure performed by:  Consent: Verbal consent obtained. Risks and benefits: risks, benefits and alternatives were discussed  Consent given by: patient  Patient identity confirmed: verbally with patient, arm band and provided demographic data    Laceration repair    Date/Time: 10/6/2023 6:28 PM    Performed by: Jennifer Peters MD  Authorized by: Jennifer Peters MD  Body area: head/neck  Location details: scalp  Laceration length: 2.5 cm  Foreign bodies: no foreign bodies  Tendon involvement: none  Nerve involvement: none  Vascular damage: no    Sedation:  Patient sedated: no      Wound Dehiscence:  Superficial Wound Dehiscence: simple closure      Procedure Details:  Irrigation solution: saline  Irrigation method: syringe  Amount of cleaning: standard  Debridement: none  Degree of undermining: none  Skin closure: staples (4 staples)  Number of sutures: 4 (Staples)  Approximation: close  Approximation difficulty: simple  Patient tolerance: patient tolerated the procedure well with no immediate complications  Comments: Discussed with pt the risks and benefits of anesthesia prior to stapling the laceration. Through shared decision making, no anesthesia was used prior  Placing the 4 staples. Pt tolerated procedure well and 4 staples were placed. Good Hemostasis achieved. ED Course  ED Course as of 10/06/23 1921   David Downy Oct 06, 2023   1877 CT head without contrast  IMPRESSION: No acute intracranial abnormality.   Chronic microangiopathic changes.   Right parietal scalp soft tissue hematoma                             SBIRT 20yo+    Flowsheet Row Most Recent Value   Initial Alcohol Screen: US AUDIT-C     1. How often do you have a drink containing alcohol? 0 Filed at: 10/06/2023 1718   2. How many drinks containing alcohol do you have on a typical day you are drinking? 0 Filed at: 10/06/2023 1718   3b. FEMALE Any Age, or MALE 65+: How often do you have 4 or more drinks on one occassion? 0 Filed at: 10/06/2023 1718   Audit-C Score 0 Filed at: 10/06/2023 1718   JAYLAN: How many times in the past year have you. .. Used an illegal drug or used a prescription medication for non-medical reasons? Never Filed at: 10/06/2023 1718                Medical Decision Making  DDx: Scalp hematoma, Scalp laceration, Brain bleed    CT negative     Amount and/or Complexity of Data Reviewed  Radiology: ordered. Decision-making details documented in ED Course. Disposition  Final diagnoses:   Laceration of scalp, initial encounter     Time reflects when diagnosis was documented in both MDM as applicable and the Disposition within this note     Time User Action Codes Description Comment    10/6/2023  6:50 PM Madinadixon Alpers Add [S01.01XA] Laceration of scalp, initial encounter       ED Disposition     ED Disposition   Discharge    Condition   Stable    Date/Time   Fri Oct 6, 2023  6:49 PM    Comment   Laura Maradiaga discharge to home/self care. Follow-up Information     Follow up With Specialties Details Why Cindy Puente MD Family Medicine In 10 days See your primary in 7-10 days for suture removal. You may also go to any ER, or walk in clinic for removal as well.  [address]            Discharge Medication List as of 10/6/2023  6:52 PM      CONTINUE these medications which have NOT CHANGED    Details   acetaminophen (TYLENOL) 325 mg tablet Take 2 tablets (650 mg total) by mouth every 6 (six) hours as needed for mild pain, Starting Tue 2/14/2023, No Print      !! ALPRAZolam (XANAX) 1 mg tablet Take 1 tablet (1 mg total) by mouth 2 (two) times a day as needed (as needed), Starting Wed 3/15/2023, Normal      !! ALPRAZolam (XANAX) 1 mg tablet Take 1 tablet (1 mg total) by mouth 2 (two) times a day as needed for anxiety, Starting Mon 6/26/2023, Normal      !! amLODIPine (NORVASC) 5 mg tablet Take 5 mg by mouth daily, Historical Med      !! amLODIPine (NORVASC) 5 mg tablet Take 1 tablet (5 mg total) by mouth daily, Starting Thu 4/20/2023, Normal      bimatoprost (LUMIGAN) 0.01 % ophthalmic drops Apply 1 drop to eye Daily, Historical Med      brimonidine (ALPHAGAN P) 0.1 % Apply 1 drop to eye every 12 (twelve) hours, Historical Med      brimonidine tartrate 0.2 % ophthalmic solution INSTILL 1 DROP BOTH EYES 3 TIMES A DAY, Historical Med      !! citalopram (CeleXA) 10 mg tablet Take 10 mg by mouth daily, Historical Med      !! citalopram (CeleXA) 10 mg tablet Take 1 tablet (10 mg total) by mouth daily, Starting Thu 4/20/2023, Normal      hydrocortisone (ANUSOL-HC) 2.5 % rectal cream Apply topically 2 (two) times a day, Starting Tue 2/22/2022, Normal      !! levothyroxine 50 mcg tablet Take 50 mcg by mouth daily, Historical Med      !! levothyroxine 50 mcg tablet Take 1 tablet (50 mcg total) by mouth daily, Starting Thu 4/20/2023, Normal       !! - Potential duplicate medications found. Please discuss with provider. No discharge procedures on file. PDMP Review       Value Time User    PDMP Reviewed  Yes 4/20/2023  5:19 PM Maye Fuentes, [address] [place] [address]           ED Provider  Attending physically available and evaluated Beto Miramontes. I managed the patient along with the ED Attending.     Electronically Signed by         Arturo Davison MD  10/06/23 1921

## 2023-10-10 ENCOUNTER — RA CDI HCC (OUTPATIENT)
Dept: OTHER | Facility: HOSPITAL | Age: 78
End: 2023-10-10

## 2023-10-10 NOTE — PROGRESS NOTES
[address] [facility] coding opportunities       Chart reviewed, no opportunity found: [id number] RAWLS        Patients Insurance     Medicare Insurance: [facility]

## 2023-10-12 ENCOUNTER — OFFICE VISIT (OUTPATIENT)
Dept: FAMILY MEDICINE CLINIC | Facility: CLINIC | Age: 78
End: 2023-10-12
Payer: COMMERCIAL

## 2023-10-12 VITALS
HEART RATE: 88 BPM | DIASTOLIC BLOOD PRESSURE: 80 MMHG | SYSTOLIC BLOOD PRESSURE: 120 MMHG | OXYGEN SATURATION: 95 % | TEMPERATURE: 97.8 F | HEIGHT: 62 IN | WEIGHT: 90.38 LBS | BODY MASS INDEX: 16.63 KG/M2 | RESPIRATION RATE: 18 BRPM

## 2023-10-12 DIAGNOSIS — Z00.00 MEDICARE ANNUAL WELLNESS VISIT, SUBSEQUENT: ICD-10-CM

## 2023-10-12 DIAGNOSIS — S01.01XA LACERATION OF SCALP, INITIAL ENCOUNTER: Primary | ICD-10-CM

## 2023-10-12 PROCEDURE — G0439 PPPS, SUBSEQ VISIT: HCPCS | Performed by: FAMILY MEDICINE

## 2023-10-12 PROCEDURE — 99213 OFFICE O/P EST LOW 20 MIN: CPT | Performed by: FAMILY MEDICINE

## 2023-10-12 NOTE — PATIENT INSTRUCTIONS
Medicare Preventive Visit Patient Instructions  Thank you for completing your Welcome to Medicare Visit or Medicare Annual Wellness Visit today. Your next wellness visit will be due in one year (10/12/2024). The screening/preventive services that you may require over the next 5-10 years are detailed below. Some tests may not apply to you based off risk factors and/or age. Screening tests ordered at today's visit but not completed yet may show as past due. Also, please note that scanned in results may not display below. Preventive Screenings:  Service Recommendations Previous Testing/Comments   Colorectal Cancer Screening  * Colonoscopy    * Fecal Occult Blood Test (FOBT)/Fecal Immunochemical Test (FIT)  * Fecal DNA/Cologuard Test  * Flexible Sigmoidoscopy Age: 43-73 years old   Colonoscopy: every 10 years (may be performed more frequently if at higher risk)  OR  FOBT/FIT: every 1 year  OR  Cologuard: every 3 years  OR  Sigmoidoscopy: every 5 years  Screening may be recommended earlier than age 39 if at higher risk for colorectal cancer. Also, an individualized decision between you and your healthcare provider will decide whether screening between the ages of 77-80 would be appropriate. Colonoscopy: 06/06/2016  FOBT/FIT: Not on file  Cologuard: Not on file  Sigmoidoscopy: Not on file    Screening Current     Breast Cancer Screening Age: 36 years old  Frequency: every 1-2 years  Not required if history of left and right mastectomy Mammogram: 10/29/2019    Screening Not Indicated   Cervical Cancer Screening Between the ages of 21-29, pap smear recommended once every 3 years. Between the ages of 32-69, can perform pap smear with HPV co-testing every 5 years.    Recommendations may differ for women with a history of total hysterectomy, cervical cancer, or abnormal pap smears in past. Pap Smear: Not on file    Screening Not Indicated   Hepatitis C Screening Once for adults born between 1945 and 1965  More frequently in patients at high risk for Hepatitis C Hep C Antibody: Not on file        Diabetes Screening 1-2 times per year if you're at risk for diabetes or have pre-diabetes Fasting glucose: No results in last 5 years (No results in last 5 years)  A1C: No results in last 5 years (No results in last 5 years)  Screening Current   Cholesterol Screening Once every 5 years if you don't have a lipid disorder. May order more often based on risk factors. Lipid panel: 02/16/2022    Screening Current     Other Preventive Screenings Covered by Medicare:  Abdominal Aortic Aneurysm (AAA) Screening: covered once if your at risk. You're considered to be at risk if you have a family history of AAA. Lung Cancer Screening: covers low dose CT scan once per year if you meet all of the following conditions: (1) Age 48-67; (2) No signs or symptoms of lung cancer; (3) Current smoker or have quit smoking within the last 15 years; (4) You have a tobacco smoking history of at least 20 pack years (packs per day multiplied by number of years you smoked); (5) You get a written order from a healthcare provider. Glaucoma Screening: covered annually if you're considered high risk: (1) You have diabetes OR (2) Family history of glaucoma OR (3)  aged 48 and older OR (3)  American aged 72 and older  Osteoporosis Screening: covered every 2 years if you meet one of the following conditions: (1) You're estrogen deficient and at risk for osteoporosis based off medical history and other findings; (2) Have a vertebral abnormality; (3) On glucocorticoid therapy for more than 3 months; (4) Have primary hyperparathyroidism; (5) On osteoporosis medications and need to assess response to drug therapy. Last bone density test (DXA Scan): 04/25/2016. HIV Screening: covered annually if you're between the age of 14-79. Also covered annually if you are younger than 13 and older than 72 with risk factors for HIV infection.  For pregnant patients, it is covered up to 3 times per pregnancy. Immunizations:  Immunization Recommendations   Influenza Vaccine Annual influenza vaccination during flu season is recommended for all persons aged >= 6 months who do not have contraindications   Pneumococcal Vaccine   * Pneumococcal conjugate vaccine = PCV13 (Prevnar 13), PCV15 (Vaxneuvance), PCV20 (Prevnar 20)  * Pneumococcal polysaccharide vaccine = PPSV23 (Pneumovax) Adults 57-74 yo with certain risk factors or if 69+ yo  If never received any pneumonia vaccine: recommend Prevnar 20 (PCV20)  Give PCV20 if previously received 1 dose of PCV13 or PPSV23   Hepatitis B Vaccine 3 dose series if at intermediate or high risk (ex: diabetes, end stage renal disease, liver disease)   Respiratory syncytial virus (RSV) Vaccine - COVERED BY MEDICARE PART D  * RSVPreF3 (Arexvy) CDC recommends that adults 61years of age and older may receive a single dose of RSV vaccine using shared clinical decision-making (SCDM)   Tetanus (Td) Vaccine - COST NOT COVERED BY MEDICARE PART B Following completion of primary series, a booster dose should be given every 10 years to maintain immunity against tetanus. Td may also be given as tetanus wound prophylaxis. Tdap Vaccine - COST NOT COVERED BY MEDICARE PART B Recommended at least once for all adults. For pregnant patients, recommended with each pregnancy.    Shingles Vaccine (Shingrix) - COST NOT COVERED BY MEDICARE PART B  2 shot series recommended in those 19 years and older who have or will have weakened immune systems or those 50 years and older     Health Maintenance Due:      Topic Date Due   • Hepatitis C Screening  Never done   • Lung Cancer Screening  Never done   • Breast Cancer Screening: Mammogram  10/29/2021   • Colorectal Cancer Screening  Discontinued     Immunizations Due:      Topic Date Due   • Hepatitis A Vaccine (1 of 2 - Risk 2-dose series) Never done   • Hepatitis B Vaccine (1 of 3 - Risk 3-dose series) Never done   • COVID-19 Vaccine (4 - Pfizer series) 04/09/2022     Advance Directives   What are advance directives? Advance directives are legal documents that state your wishes and plans for medical care. These plans are made ahead of time in case you lose your ability to make decisions for yourself. Advance directives can apply to any medical decision, such as the treatments you want, and if you want to donate organs. What are the types of advance directives? There are many types of advance directives, and each state has rules about how to use them. You may choose a combination of any of the following:  Living will: This is a written record of the treatment you want. You can also choose which treatments you do not want, which to limit, and which to stop at a certain time. This includes surgery, medicine, IV fluid, and tube feedings. Durable power of  for healthcare [facility]): This is a written record that states who you want to make healthcare choices for you when you are unable to make them for yourself. This person, called a proxy, is usually a family member or a friend. You may choose more than 1 proxy. Do not resuscitate (DNR) order:  A DNR order is used in case your heart stops beating or you stop breathing. It is a request not to have certain forms of treatment, such as CPR. A DNR order may be included in other types of advance directives. Medical directive: This covers the care that you want if you are in a coma, near death, or unable to make decisions for yourself. You can list the treatments you want for each condition. Treatment may include pain medicine, surgery, blood transfusions, dialysis, IV or tube feedings, and a ventilator (breathing machine). Values history: This document has questions about your views, beliefs, and how you feel and think about life. This information can help others choose the care that you would choose. Why are advance directives important?   An advance directive helps you control your care. Although spoken wishes may be used, it is better to have your wishes written down. Spoken wishes can be misunderstood, or not followed. Treatments may be given even if you do not want them. An advance directive may make it easier for your family to make difficult choices about your care. Fall Prevention    Fall prevention  includes ways to make your home and other areas safer. It also includes ways you can move more carefully to prevent a fall. Health conditions that cause changes in your blood pressure, vision, or muscle strength and coordination may increase your risk for falls. Medicines may also increase your risk for falls if they make you dizzy, weak, or sleepy. Fall prevention tips:   Stand or sit up slowly. Use assistive devices as directed. Wear shoes that fit well and have soles that . Wear a personal alarm. Stay active. Manage your medical conditions. Home Safety Tips:  Add items to prevent falls in the bathroom. Keep paths clear. Install bright lights in your home. Keep items you use often on shelves within reach. Paint or place reflective tape on the edges of your stairs. Urinary Incontinence   Urinary incontinence (UI)  is when you lose control of your bladder. UI develops because your bladder cannot store or empty urine properly. The 3 most common types of UI are stress incontinence, urge incontinence, or both. Medicines:   May be given to help strengthen your bladder control. Report any side effects of medication to your healthcare provider. Do pelvic muscle exercises often:  Your pelvic muscles help you stop urinating. Squeeze these muscles tight for 5 seconds, then relax for 5 seconds. Gradually work up to squeezing for 10 seconds. Do 3 sets of 15 repetitions a day, or as directed. This will help strengthen your pelvic muscles and improve bladder control.   Train your bladder:  Go to the bathroom at set times, such as every 2 hours, even if you do not feel the urge to go. You can also try to hold your urine when you feel the urge to go. For example, hold your urine for 5 minutes when you feel the urge to go. As that becomes easier, hold your urine for 10 minutes. Self-care:   Keep a UI record. Write down how often you leak urine and how much you leak. Make a note of what you were doing when you leaked urine. Drink liquids as directed. You may need to limit the amount of liquid you drink to help control your urine leakage. Do not drink any liquid right before you go to bed. Limit or do not have drinks that contain caffeine or alcohol. Prevent constipation. Eat a variety of high-fiber foods. Good examples are high-fiber cereals, beans, vegetables, and whole-grain breads. Walking is the best way to trigger your intestines to have a bowel movement. Exercise regularly and maintain a healthy weight. Weight loss and exercise will decrease pressure on your bladder and help you control your leakage. Use a catheter as directed  to help empty your bladder. A catheter is a tiny, plastic tube that is put into your bladder to drain your urine. Go to behavior therapy as directed. Behavior therapy may be used to help you learn to control your urge to urinate. Cigarette Smoking and Your Health   Risks to your health if you smoke:  Nicotine and other chemicals found in tobacco damage every cell in your body. Even if you are a light smoker, you have an increased risk for cancer, heart disease, and lung disease. If you are pregnant or have diabetes, smoking increases your risk for complications. Benefits to your health if you stop smoking: You decrease respiratory symptoms such as coughing, wheezing, and shortness of breath. You reduce your risk for cancers of the lung, mouth, throat, kidney, bladder, pancreas, stomach, and cervix. If you already have cancer, you increase the benefits of chemotherapy.  You also reduce your risk for cancer returning or a second cancer from developing. You reduce your risk for heart disease, blood clots, heart attack, and stroke. You reduce your risk for lung infections, and diseases such as pneumonia, asthma, chronic bronchitis, and emphysema. Your circulation improves. More oxygen can be delivered to your body. If you have diabetes, you lower your risk for complications, such as kidney, artery, and eye diseases. You also lower your risk for nerve damage. Nerve damage can lead to amputations, poor vision, and blindness. You improve your body's ability to heal and to fight infections. For more information and support to stop smoking:   [employer]. [employer]  Phone: 8- [phone] - [phone]  Web Address: www.[employer]  Underweight  Underweight is defined as having a body mass index (BMI) of less than 18.5 kg/m2   Anorexia  is a loss of appetite, decreased food intake, or both. Your appetite naturally decreases as you get older. You also get full faster than you used to. This occurs because your body needs less energy. Other body changes can also lead to a decreased appetite. Even though some appetite loss is normal, you still need to get enough calories and nutrients to keep you healthy. You can start to lose too much weight if you do not eat as much food as your body needs. Unwanted weight loss can cause health problems, or worsen health problems you already have. You can also become dehydrated if you do not drink enough liquid. How to eat healthy and get enough nutrients:   Choose healthy foods. Eat a variety of fruits, vegetables, whole grains, low-fat dairy foods, lean meats, and other protein foods. Limit foods high in fat, sugar, and salt. Limit or avoid alcohol as directed. Work with a dietitian to help you plan your meals if you need to follow a special diet. A dietitian can also teach you how to modify foods if you have trouble chewing or swallowing.    Snack on healthy foods between meals  if you only eat a small amount during meals. Snacks provide extra healthy nutrients and calories between meals. Examples include fruit, cheese, and whole grain crackers. Drink liquids as directed  to avoid dehydration. Drink liquids between meals if they cause you to get full too quickly during meals. Ask how much liquid to drink each day and which liquids are best for you. Use herbs, spices, and flavor enhancers to add flavor to foods. Avoid using herbs and spice blends that also contain sodium. Ask your healthcare provider or dietitian about flavor enhancers. Flavor enhancers with ham, natural daly, and roast beef flavors can also be sprinkled on food to add flavor. Share meals with others as often as you can. Eating with others may help you to eat better during meal time. Ask family members, neighbors, or friends to join you for lunch. There are also senior centers where you can meet people, and share meals with them. Ask family and friends for help  with shopping or preparing foods. Ask for a ride to the grocery store, if needed. © Copyright [employer] 2018 Information is for End User's use only and may not be sold, redistributed or otherwise used for commercial purposes.  All illustrations and images included in CareNotes® are the copyrighted property of [employer] or  [employer]

## 2023-10-12 NOTE — ASSESSMENT & PLAN NOTE
Scalp laceration. Laceration appears to be healing well. Using sterile equipment 4 staples were removed from scalp. Patient will keep the area clean and dry.   Her tetanus vaccination is up-to-date

## 2023-10-12 NOTE — PROGRESS NOTES
Assessment and Plan:     Problem List Items Addressed This Visit          Other    Scalp laceration - Primary     Scalp laceration. Laceration appears to be healing well. Using sterile equipment 4 staples were removed from scalp. Patient will keep the area clean and dry. Her tetanus vaccination is up-to-date          Other Visit Diagnoses       Medicare annual wellness visit, subsequent                 Preventive health issues were discussed with patient, and age appropriate screening tests were ordered as noted in patient's After Visit Summary. Personalized health advice and appropriate referrals for health education or preventive services given if needed, as noted in patient's After Visit Summary. History of Present Illness:     Patient presents for a Medicare Wellness Visit    Patient in the office after sustaining a scalp laceration 6 days ago where she was seen in the emergency room. Patient had 4 staples placed no wound. Patient had a CAT scan that was unremarkable. Patient denies any discomfort at this time. Patient's tetanus vaccine was up-to-date. Patient sustained a fall when trying to open a door and fell backwards when she hit her head. She denies any loss of consciousness. Suture / Staple Removal       Patient Care Team:  Sherine Briseno MD as PCP - General (Family Medicine)  Sherine Briseno MD as PCP - [facility]CP-[facility] PCP - [address] [facility] ([facility])  MD Aby Vazquez MD Margit Minor, MD     Review of Systems:     Review of Systems   Constitutional: Negative. Skin:         As per HPI   Neurological: Negative.          Problem List:     Patient Active Problem List   Diagnosis    Hypertension    Anxiety    Hypothyroidism    Depression, recurrent (HCC)    Diarrhea    Hemorrhoids    SDH (subdural hematoma) (HCC)    HTN (hypertension)    Hypothyroidism    Anxiety Alcohol abuse    Fall    Swelling of left hand    Neurologic gait dysfunction    Scalp laceration      Past Medical and Surgical History:     Past Medical History:   Diagnosis Date    Anxiety     Last Assessed: 6/12/2017     Depression     FH: colonic polyps     Glaucoma     Hypertension     Last Assessed: 12/12/2017    Hypothyroidism     Last Assessed: 6/12/2017    Menopause     Normal vaginal delivery     Osteopenia     Pap smear abnormality of cervix with ASCUS favoring benign     Telangiectasis      Past Surgical History:   Procedure Laterality Date    CATARACT EXTRACTION      COLONOSCOPY      complete    DILATION AND CURETTAGE OF UTERUS  01/10/1978    HEMORROIDECTOMY      x2    LAPAROSCOPY      Diagnostic     TUBAL LIGATION        Family History:     Family History   Problem Relation Age of Onset    Heart disease Mother         Cardiac Disorder     Colonic polyp Mother     Hypertension Mother     Heart disease Father         Cardiac Dsiorder     Hypertension Father     Lung cancer Father     Breast cancer Sister 76    Stomach cancer Sister     Colonic polyp Brother     Lung cancer Brother     Hepatitis Daughter         Alcoholic    Arthritis Family     Lung cancer Family     No Known Problems Maternal Grandmother     No Known Problems Maternal Grandfather     No Known Problems Paternal Grandmother     No Known Problems Paternal Grandfather     No Known Problems Son     Esophageal cancer Sister 64      Social History:     Social History     Socioeconomic History    Marital status: /Civil Union     Spouse name: None    Number of children: None    Years of education: None    Highest education level: None   Occupational History    None   Tobacco Use    Smoking status: Every Day     Packs/day: 1.00     Years: 35.00     Total pack years: 35.00     Types: Cigarettes    Smokeless tobacco: Never   Vaping Use    Vaping Use: Never used   Substance and Sexual Activity    Alcohol use:  Yes     Alcohol/week: 21.0 standard drinks of alcohol     Types: 21 Glasses of wine per week     Comment: no more than 3 glasses of wine per day    Drug use: No    Sexual activity: Yes   Other Topics Concern    None   Social History Narrative    ** Merged History Encounter **         Drinks Coffee - 1-2 cup a day   Exercises Daily      Social Determinants of Health     Financial Resource Strain: Low Risk  (10/12/2023)    Overall Financial Resource Strain (CARDIA)     Difficulty of Paying Living Expenses: Not very hard   Food Insecurity: No Food Insecurity (2/14/2023)    Hunger Vital Sign     Worried About Running Out of Food in the Last Year: Never true     Ran Out of Food in the Last Year: Never true   Transportation Needs: No Transportation Needs (10/12/2023)    PRAPARE - Transportation     Lack of Transportation (Medical): No     Lack of Transportation (Non-Medical):  No   Physical Activity: Not on file   Stress: Not on file   Social Connections: Not on file   Intimate Partner Violence: Not on file   Housing Stability: Low Risk  (2/14/2023)    Housing Stability Vital Sign     Unable to Pay for Housing in the Last Year: No     Number of Places Lived in the Last Year: 1     Unstable Housing in the Last Year: No      Medications and Allergies:     Current Outpatient Medications   Medication Sig Dispense Refill    acetaminophen (TYLENOL) 325 mg tablet Take 2 tablets (650 mg total) by mouth every 6 (six) hours as needed for mild pain  0    ALPRAZolam (XANAX) 1 mg tablet Take 1 tablet (1 mg total) by mouth 2 (two) times a day as needed for anxiety 60 tablet 3    amLODIPine (NORVASC) 5 mg tablet Take 1 tablet (5 mg total) by mouth daily 90 tablet 3    citalopram (CeleXA) 10 mg tablet Take 1 tablet (10 mg total) by mouth daily 90 tablet 3    ALPRAZolam (XANAX) 1 mg tablet Take 1 tablet (1 mg total) by mouth 2 (two) times a day as needed (as needed) (Patient not taking: Reported on 10/12/2023) 180 tablet 0    amLODIPine (NORVASC) 5 mg tablet Take 5 mg by mouth daily      bimatoprost (LUMIGAN) 0.01 % ophthalmic drops Apply 1 drop to eye Daily (Patient not taking: Reported on 9/13/2022)      brimonidine (ALPHAGAN P) 0.1 % Apply 1 drop to eye every 12 (twelve) hours (Patient not taking: Reported on 9/13/2022)      brimonidine tartrate 0.2 % ophthalmic solution INSTILL 1 DROP BOTH EYES 3 TIMES A DAY (Patient not taking: Reported on 3/3/2023)      citalopram (CeleXA) 10 mg tablet Take 10 mg by mouth daily (Patient not taking: Reported on 10/12/2023)      hydrocortisone (ANUSOL-HC) 2.5 % rectal cream Apply topically 2 (two) times a day (Patient not taking: Reported on 3/3/2023) 28 g 5    levothyroxine 50 mcg tablet Take 50 mcg by mouth daily (Patient not taking: Reported on 7/21/2023)      levothyroxine 50 mcg tablet Take 1 tablet (50 mcg total) by mouth daily (Patient not taking: Reported on 7/21/2023) 90 tablet 1     No current facility-administered medications for this visit. No Known Allergies   Immunizations:     Immunization History   Administered Date(s) Administered    COVID-19 PFIZER VACCINE 0.3 ML IM 05/17/2021, 06/15/2021    COVID-19 Pfizer vac (Ozzy-sucrose, gray cap) 12 yr+ IM 02/12/2022    Influenza, seasonal, injectable 09/22/2016    Pneumococcal Conjugate 13-Valent 05/17/2019    Pneumococcal Polysaccharide PPV23 06/12/2017    Tdap 02/12/2023    Zoster 01/04/2016    Zoster Vaccine Recombinant 01/04/2016      Health Maintenance:         Topic Date Due    Hepatitis C Screening  Never done    Lung Cancer Screening  Never done    Breast Cancer Screening: Mammogram  10/29/2021    Colorectal Cancer Screening  Discontinued         Topic Date Due    Hepatitis A Vaccine (1 of 2 - Risk 2-dose series) Never done    Hepatitis B Vaccine (1 of 3 - Risk 3-dose series) Never done    COVID-19 Vaccine (4 - Pfizer series) 04/09/2022      Medicare Screening Tests and Risk Assessments:         Health Risk Assessment:   Patient rates overall health as fair.  Patient feels that their physical health rating is slightly worse. Patient is satisfied with their life. Eyesight was rated as same. Hearing was rated as same. Patient feels that their emotional and mental health rating is slightly better. Patients states they are never, rarely angry. Patient states they are never, rarely unusually tired/fatigued. Pain experienced in the last 7 days has been none. Patient states that she has experienced no weight loss or gain in last 6 months. Depression Screening:   PHQ-9 Score: 3      Fall Risk Screening: In the past year, patient has experienced: history of falling in past year    Number of falls: 1  Injured during fall?: No    Feels unsteady when standing or walking?: No    Worried about falling?: Yes      Urinary Incontinence Screening:   Patient has leaked urine accidently in the last six months. Home Safety:  Patient does not have trouble with stairs inside or outside of their home. Patient has working smoke alarms and has working carbon monoxide detector. Home safety hazards include: none. Nutrition:   Current diet is Regular. Medications:   Patient is currently taking over-the-counter supplements. OTC medications include: see medication list. Patient is able to manage medications. Activities of Daily Living (ADLs)/Instrumental Activities of Daily Living (IADLs):   Walk and transfer into and out of bed and chair?: Yes  Dress and groom yourself?: Yes    Bathe or shower yourself?: Yes    Feed yourself?  Yes  Do your laundry/housekeeping?: Yes  Manage your money, pay your bills and track your expenses?: Yes  Make your own meals?: Yes    Do your own shopping?: Yes    Previous Hospitalizations:   Any hospitalizations or ED visits within the last 12 months?: No      Advance Care Planning:   Living will: No      PREVENTIVE SCREENINGS      Cardiovascular Screening:    General: Screening Current      Diabetes Screening:     General: Screening Current      Colorectal Cancer Screening:     General: Screening Current      Breast Cancer Screening:     General: Screening Not Indicated      Cervical Cancer Screening:    General: Screening Not Indicated      Lung Cancer Screening:     General: Screening Not Indicated    Screening, Brief Intervention, and Referral to Treatment (SBIRT)    Screening    Typical number of drinks in a week: 0    Single Item Drug Screening:  How often have you used an illegal drug (including marijuana) or a prescription medication for non-medical reasons in the past year? never    Single Item Drug Screen Score: 0  Interpretation: Negative screen for possible drug use disorder    No results found. Physical Exam:     /80   Pulse 88   Temp 97.8 °F (36.6 °C)   Resp 18   Ht 5' 2" (1.575 m)   Wt 41 kg (90 lb 6 oz)   SpO2 95%   BMI 16.53 kg/m²     Physical Exam  Constitutional:       General: She is not in acute distress. Appearance: Normal appearance. She is not ill-appearing. Skin:     Comments: Patient with a vertical laceration on right occiput area of her scalp with 4 staples in place. Incision appears to be clean and dry with no signs of infection. It is approximately 2 inches in length. Neurological:      Mental Status: She is alert. Mental status is at baseline. Psychiatric:         Mood and Affect: Mood normal.         Behavior: Behavior normal.         Thought Content:  Thought content normal.         Judgment: Judgment normal.          Amish Yusuf MD

## 2023-10-18 DIAGNOSIS — E03.9 HYPOTHYROIDISM, UNSPECIFIED TYPE: ICD-10-CM

## 2023-10-18 RX ORDER — LEVOTHYROXINE SODIUM 0.05 MG/1
50 TABLET ORAL DAILY
Qty: 90 TABLET | Refills: 1 | Status: SHIPPED | OUTPATIENT
Start: 2023-10-18

## 2023-10-24 LAB
ALBUMIN SERPL ELPH-MCNC: 4.2 G/DL (ref 3.8–4.8)
ALPHA1 GLOB SERPL ELPH-MCNC: 0.4 G/DL (ref 0.2–0.3)
ALPHA2 GLOB SERPL ELPH-MCNC: 0.8 G/DL (ref 0.5–0.9)
BASOPHILS # BLD AUTO: 43 CELLS/UL (ref 0–200)
BASOPHILS NFR BLD AUTO: 0.7 %
BETA1 GLOB SERPL ELPH-MCNC: 0.6 G/DL (ref 0.4–0.6)
BETA2 GLOB SERPL ELPH-MCNC: 0.5 G/DL (ref 0.2–0.5)
EOSINOPHIL # BLD AUTO: 92 CELLS/UL (ref 15–500)
EOSINOPHIL NFR BLD AUTO: 1.5 %
ERYTHROCYTE [DISTWIDTH] IN BLOOD BY AUTOMATED COUNT: 14.2 % (ref 11–15)
GAMMA GLOB SERPL ELPH-MCNC: 1.4 G/DL (ref 0.8–1.7)
HCT VFR BLD AUTO: 54.9 % (ref 35–45)
HGB BLD-MCNC: 19.2 G/DL (ref 11.7–15.5)
LYMPHOCYTES # BLD AUTO: 1867 CELLS/UL (ref 850–3900)
LYMPHOCYTES NFR BLD AUTO: 30.6 %
MCH RBC QN AUTO: 32.7 PG (ref 27–33)
MCHC RBC AUTO-ENTMCNC: 35 G/DL (ref 32–36)
MCV RBC AUTO: 93.5 FL (ref 80–100)
MONOCYTES # BLD AUTO: 628 CELLS/UL (ref 200–950)
MONOCYTES NFR BLD AUTO: 10.3 %
NEUTROPHILS # BLD AUTO: 3471 CELLS/UL (ref 1500–7800)
NEUTROPHILS NFR BLD AUTO: 56.9 %
PLATELET # BLD AUTO: 249 THOUSAND/UL (ref 140–400)
PMV BLD REES-ECKER: 9.9 FL (ref 7.5–12.5)
PROT SERPL-MCNC: 7.8 G/DL (ref 6.1–8.1)
RBC # BLD AUTO: 5.87 MILLION/UL (ref 3.8–5.1)
VIT B12 SERPL-MCNC: 265 PG/ML (ref 200–1100)
WBC # BLD AUTO: 6.1 THOUSAND/UL (ref 3.8–10.8)

## 2023-11-03 DIAGNOSIS — R26.9 NEUROLOGIC GAIT DYSFUNCTION: Primary | ICD-10-CM

## 2023-11-03 DIAGNOSIS — E53.8 B12 DEFICIENCY: ICD-10-CM

## 2023-11-09 ENCOUNTER — TELEPHONE (OUTPATIENT)
Dept: NEUROLOGY | Facility: CLINIC | Age: 78
End: 2023-11-09

## 2023-11-09 NOTE — TELEPHONE ENCOUNTER
----- Message from James Gann MD sent at 11/3/2023  5:08 PM EDT -----  Blood work completed and shows B12 deficiency and a polyclonal pattern in the gamma   Region of unclear clinical significance. Lab recommended having her complete a Serum and   urine immunofixation. Orders placed for these studies and informed her PCP of these results. She can supplement her B12 with OTC B12 supplements at least 1000 mcq a day for 1 month and then should get level checked again (order placed)    Please let me or luz maria know if she has any follow up questions that you can't answer    -Dr. Ed Samuel      ----- Message -----  From: Rony Haider Results In  Sent: 10/23/2023   7:00 PM EDT  To:  James Gann MD

## 2023-11-13 NOTE — TELEPHONE ENCOUNTER
Recd vm 11/10 taken off 11/13     this is [employer] . So I was calling, I had received a voice mail about my blood work, and I just wanted to know more of the details and what I should do about this and  you have my number [phone].

## 2023-11-14 ENCOUNTER — TELEPHONE (OUTPATIENT)
Dept: FAMILY MEDICINE CLINIC | Facility: CLINIC | Age: 78
End: 2023-11-14

## 2023-11-14 NOTE — TELEPHONE ENCOUNTER
received vm from 11/13 at 11:16am-yes, this shey love, uh, I'm just returning a call. I had on my uh, phone. I was, wasn't in at the time. So if you can give me a call. it's about a question about what was on my voice mail, so please call me back. Thank you.   [phone]  ---------------------------------------  Please see maria's message

## 2023-11-14 NOTE — TELEPHONE ENCOUNTER
Message left for patient informing of previous results/recommendations. Requested call back if further questions.

## 2023-11-14 NOTE — TELEPHONE ENCOUNTER
Patient called in and would like a call back in regards blood work results. Please call patient back when available. Thank you!

## 2023-11-14 NOTE — TELEPHONE ENCOUNTER
Carmen Boothe called regarding recent lab work that was completed from neurology. She states neurology office was not giving her any more information aside from B12 levels and she wanted to make sure everything was okay, and if she should have any concerns. Please advise.

## 2023-11-15 NOTE — TELEPHONE ENCOUNTER
Called re: below and spoke with pt, who verbalized an understanding. Call [facility] at [phone] to obtain fax # to fax lab scripts.

## 2023-11-15 NOTE — TELEPHONE ENCOUNTER
received vm from 11/13 at 1:16pm- Uh uh yes, I was curious about my tests and my blood work results and i can't seem to connect with anyone who can give me an answer. I'd appreciate it if you'd call me back. My name is Desirae Valenzuela, the date of birth, [birth year], and I'll be here all day. So the sooner the better because it has me a little worried, but I'd like to find out what the problem is. Thank you.  Marcella.  [phone]

## 2023-11-15 NOTE — TELEPHONE ENCOUNTER
11/13 12:20pm  It Oh yeah, this is [employer]. I had some blood work done and I got a couple of messages on my phone that there's some issues and I just, I can't seem to get in touch with the right person. I just wanted to know what's going on. My number is [phone]. Thank you. no known precautions/limitations

## 2023-11-15 NOTE — TELEPHONE ENCOUNTER
received vm from 11/13 at 1:11pm- yes, this is [facility] love. I'm calling about the [facility], i was suppose to quit smoking. So my gums got in better shape before my teeth removal and I just wondered When dr. Qing Anderson would like to see me. you can return my call birthday to [birth year].  you can call, return my call at [phone]. Thank you.

## 2023-11-16 NOTE — TELEPHONE ENCOUNTER
11/14 12:40    Pt left a VM re: blood work. Transcribed VM:   This is Karli Love calling. Date of birth [birth year]. I am calling about the results of my blood work. I got thing down, but there's something else that I was supposed to call back. And I called yesterday, a couple of times, and no one returned my call. There was something going on besides the fact they want me to take Vitamin B12. But there was something else they didn't tell me, that I was wondering what it could be. So if someone would please call me back, I appreciate it. [phone]. Thank you. Already spoke with pt yesterday. Called [facility] and obtained fax # for [facility] location ([phone]). Faxed X's 2 to # provided.

## 2023-11-22 LAB
CREAT 24H UR-MRATE: 0.32 G/24 H (ref 0.5–2.15)
PROT 24H UR-MRATE: 40 MG/24 H
PROT/CREAT 24H UR: 0.12 MG/MG CREAT
PROT/CREAT 24H UR: 125 MG/G CREAT

## 2023-11-24 NOTE — TELEPHONE ENCOUNTER
It appears neurology office wanted her to perform other testing and urine test?  She should have been notified by their office in regards to these need tests.   I am sure if anything was significantly abnormal they would notify her of the need to check anything further

## 2023-11-28 LAB
ALBUMIN ?TM MFR UR ELPH: 78 %
ALPHA1 GLOB ?TM MFR UR ELPH: 0 %
ALPHA2 GLOB ?TM MFR UR ELPH: 0 %
B-GLOBULIN ?TM MFR UR ELPH: 0 %
CREAT 24H UR-MRATE: 0.32 G/24 H (ref 0.5–2.15)
GAMMA GLOB ?TM MFR UR ELPH: 22 %
INTERPRETATION UR IFE-IMP: ABNORMAL
PROT 24H UR-MRATE: 40 MG/24 H
PROT/CREAT 24H UR: 0.12 MG/MG CREAT
PROT/CREAT 24H UR: 125 MG/G CREAT

## 2023-12-01 DIAGNOSIS — F41.9 ANXIETY: ICD-10-CM

## 2023-12-01 RX ORDER — ALPRAZOLAM 1 MG/1
1 TABLET ORAL 2 TIMES DAILY PRN
Qty: 60 TABLET | Refills: 3 | Status: SHIPPED | OUTPATIENT
Start: 2023-12-01

## 2023-12-11 PROBLEM — S01.01XA SCALP LACERATION: Status: RESOLVED | Noted: 2023-10-12 | Resolved: 2023-12-11

## 2024-01-04 ENCOUNTER — TELEPHONE (OUTPATIENT)
Dept: FAMILY MEDICINE CLINIC | Facility: CLINIC | Age: 79
End: 2024-01-04

## 2024-01-04 NOTE — TELEPHONE ENCOUNTER
Patient called to see if you knew of any dentists that would be more affordable for her as she is on a fixed income with social security. Please advise.

## 2024-01-04 NOTE — TELEPHONE ENCOUNTER
She may want to look into the [facility] dental clinic which may be more affordable for routine dental care.  This is different than the [facility] oral maxillary office which is more for oral surgery.  Please provide phone number

## 2024-04-13 ENCOUNTER — TELEPHONE (OUTPATIENT)
Dept: FAMILY MEDICINE CLINIC | Facility: CLINIC | Age: 79
End: 2024-04-13

## 2024-04-13 DIAGNOSIS — F41.9 ANXIETY: ICD-10-CM

## 2024-04-13 DIAGNOSIS — E03.9 HYPOTHYROIDISM, UNSPECIFIED TYPE: ICD-10-CM

## 2024-04-14 ENCOUNTER — HOSPITAL ENCOUNTER (EMERGENCY)
Facility: HOSPITAL | Age: 79
Discharge: HOME | End: 2024-04-14
Attending: EMERGENCY MEDICINE | Admitting: EMERGENCY MEDICINE
Payer: COMMERCIAL

## 2024-04-14 ENCOUNTER — APPOINTMENT (EMERGENCY)
Dept: RADIOLOGY | Facility: HOSPITAL | Age: 79
End: 2024-04-14
Payer: COMMERCIAL

## 2024-04-14 VITALS
SYSTOLIC BLOOD PRESSURE: 121 MMHG | TEMPERATURE: 97.3 F | HEART RATE: 66 BPM | OXYGEN SATURATION: 92 % | RESPIRATION RATE: 18 BRPM | DIASTOLIC BLOOD PRESSURE: 59 MMHG

## 2024-04-14 DIAGNOSIS — R42 POSTURAL DIZZINESS WITH PRESYNCOPE: Primary | ICD-10-CM

## 2024-04-14 DIAGNOSIS — R55 POSTURAL DIZZINESS WITH PRESYNCOPE: Primary | ICD-10-CM

## 2024-04-14 DIAGNOSIS — R79.89 ELEVATED TSH: ICD-10-CM

## 2024-04-14 DIAGNOSIS — I74.10 AORTIC THROMBUS (HCC): ICD-10-CM

## 2024-04-14 DIAGNOSIS — K52.9 COLITIS: ICD-10-CM

## 2024-04-14 LAB
ALBUMIN SERPL BCP-MCNC: 4.3 G/DL (ref 3.5–5)
ALP SERPL-CCNC: 63 U/L (ref 34–104)
ALT SERPL W P-5'-P-CCNC: 10 U/L (ref 7–52)
ANION GAP SERPL CALCULATED.3IONS-SCNC: 14 MMOL/L (ref 4–13)
AST SERPL W P-5'-P-CCNC: 19 U/L (ref 13–39)
ATRIAL RATE: 74 BPM
BASOPHILS # BLD AUTO: 0.06 THOUSANDS/ÂΜL (ref 0–0.1)
BASOPHILS NFR BLD AUTO: 1 % (ref 0–1)
BILIRUB SERPL-MCNC: 1.75 MG/DL (ref 0.2–1)
BUN SERPL-MCNC: 9 MG/DL (ref 5–25)
CALCIUM SERPL-MCNC: 9.7 MG/DL (ref 8.4–10.2)
CARDIAC TROPONIN I PNL SERPL HS: 3 NG/L
CHLORIDE SERPL-SCNC: 95 MMOL/L (ref 96–108)
CO2 SERPL-SCNC: 26 MMOL/L (ref 21–32)
CREAT SERPL-MCNC: 0.64 MG/DL (ref 0.6–1.3)
EOSINOPHIL # BLD AUTO: 0.01 THOUSAND/ÂΜL (ref 0–0.61)
EOSINOPHIL NFR BLD AUTO: 0 % (ref 0–6)
ERYTHROCYTE [DISTWIDTH] IN BLOOD BY AUTOMATED COUNT: 14.6 % (ref 11.6–15.1)
FOLATE SERPL-MCNC: 7.4 NG/ML
GFR SERPL CREATININE-BSD FRML MDRD: 86 ML/MIN/1.73SQ M
GLUCOSE SERPL-MCNC: 134 MG/DL (ref 65–140)
HCT VFR BLD AUTO: 48.2 % (ref 34.8–46.1)
HGB BLD-MCNC: 16.4 G/DL (ref 11.5–15.4)
IMM GRANULOCYTES # BLD AUTO: 0.04 THOUSAND/UL (ref 0–0.2)
IMM GRANULOCYTES NFR BLD AUTO: 0 % (ref 0–2)
LIPASE SERPL-CCNC: 18 U/L (ref 11–82)
LYMPHOCYTES # BLD AUTO: 1.85 THOUSANDS/ÂΜL (ref 0.6–4.47)
LYMPHOCYTES NFR BLD AUTO: 14 % (ref 14–44)
MCH RBC QN AUTO: 33.3 PG (ref 26.8–34.3)
MCHC RBC AUTO-ENTMCNC: 34 G/DL (ref 31.4–37.4)
MCV RBC AUTO: 98 FL (ref 82–98)
MONOCYTES # BLD AUTO: 1.11 THOUSAND/ÂΜL (ref 0.17–1.22)
MONOCYTES NFR BLD AUTO: 8 % (ref 4–12)
NEUTROPHILS # BLD AUTO: 10.1 THOUSANDS/ÂΜL (ref 1.85–7.62)
NEUTS SEG NFR BLD AUTO: 77 % (ref 43–75)
NRBC BLD AUTO-RTO: 0 /100 WBCS
P AXIS: 82 DEGREES
PLATELET # BLD AUTO: 272 THOUSANDS/UL (ref 149–390)
PMV BLD AUTO: 9.9 FL (ref 8.9–12.7)
POTASSIUM SERPL-SCNC: 3.6 MMOL/L (ref 3.5–5.3)
PR INTERVAL: 134 MS
PROT SERPL-MCNC: 7.2 G/DL (ref 6.4–8.4)
QRS AXIS: 65 DEGREES
QRSD INTERVAL: 90 MS
QT INTERVAL: 372 MS
QTC INTERVAL: 412 MS
RBC # BLD AUTO: 4.93 MILLION/UL (ref 3.81–5.12)
SODIUM SERPL-SCNC: 135 MMOL/L (ref 135–147)
T WAVE AXIS: 61 DEGREES
T4 FREE SERPL-MCNC: 0.8 NG/DL (ref 0.61–1.12)
TSH SERPL DL<=0.05 MIU/L-ACNC: 37.31 UIU/ML (ref 0.45–4.5)
VENTRICULAR RATE: 74 BPM
VIT B12 SERPL-MCNC: 1118 PG/ML (ref 180–914)
WBC # BLD AUTO: 13.17 THOUSAND/UL (ref 4.31–10.16)

## 2024-04-14 PROCEDURE — 84443 ASSAY THYROID STIM HORMONE: CPT

## 2024-04-14 PROCEDURE — 36415 COLL VENOUS BLD VENIPUNCTURE: CPT

## 2024-04-14 PROCEDURE — 84439 ASSAY OF FREE THYROXINE: CPT | Performed by: EMERGENCY MEDICINE

## 2024-04-14 PROCEDURE — 71046 X-RAY EXAM CHEST 2 VIEWS: CPT

## 2024-04-14 PROCEDURE — 93010 ELECTROCARDIOGRAM REPORT: CPT | Performed by: INTERNAL MEDICINE

## 2024-04-14 PROCEDURE — 82746 ASSAY OF FOLIC ACID SERUM: CPT

## 2024-04-14 PROCEDURE — 99285 EMERGENCY DEPT VISIT HI MDM: CPT | Performed by: EMERGENCY MEDICINE

## 2024-04-14 PROCEDURE — 85025 COMPLETE CBC W/AUTO DIFF WBC: CPT | Performed by: EMERGENCY MEDICINE

## 2024-04-14 PROCEDURE — 70450 CT HEAD/BRAIN W/O DYE: CPT

## 2024-04-14 PROCEDURE — 82607 VITAMIN B-12: CPT

## 2024-04-14 PROCEDURE — 93005 ELECTROCARDIOGRAM TRACING: CPT

## 2024-04-14 PROCEDURE — 84484 ASSAY OF TROPONIN QUANT: CPT | Performed by: EMERGENCY MEDICINE

## 2024-04-14 PROCEDURE — 96365 THER/PROPH/DIAG IV INF INIT: CPT

## 2024-04-14 PROCEDURE — 83690 ASSAY OF LIPASE: CPT

## 2024-04-14 PROCEDURE — 74177 CT ABD & PELVIS W/CONTRAST: CPT

## 2024-04-14 PROCEDURE — 99284 EMERGENCY DEPT VISIT MOD MDM: CPT

## 2024-04-14 PROCEDURE — 80053 COMPREHEN METABOLIC PANEL: CPT | Performed by: EMERGENCY MEDICINE

## 2024-04-14 RX ORDER — SODIUM CHLORIDE, SODIUM GLUCONATE, SODIUM ACETATE, POTASSIUM CHLORIDE, MAGNESIUM CHLORIDE, SODIUM PHOSPHATE, DIBASIC, AND POTASSIUM PHOSPHATE .53; .5; .37; .037; .03; .012; .00082 G/100ML; G/100ML; G/100ML; G/100ML; G/100ML; G/100ML; G/100ML
1000 INJECTION, SOLUTION INTRAVENOUS ONCE
Status: COMPLETED | OUTPATIENT
Start: 2024-04-14 | End: 2024-04-14

## 2024-04-14 RX ADMIN — IOHEXOL 100 ML: 350 INJECTION, SOLUTION INTRAVENOUS at 14:17

## 2024-04-14 RX ADMIN — SODIUM CHLORIDE, SODIUM GLUCONATE, SODIUM ACETATE, POTASSIUM CHLORIDE, MAGNESIUM CHLORIDE, SODIUM PHOSPHATE, DIBASIC, AND POTASSIUM PHOSPHATE 1000 ML: .53; .5; .37; .037; .03; .012; .00082 INJECTION, SOLUTION INTRAVENOUS at 12:41

## 2024-04-14 NOTE — DISCHARGE INSTRUCTIONS
Please follow-up in 1 week with your family doctor to be reevaluated for your thyroid levels.    Please follow-up with vascular surgery regarding the abnormal findings of your CT abdomen and pelvis.    Please return if you develop any new or concerning symptoms including chest pain, difficulty breathing, or palpitations.    CT abdomen pelvis with contrast: 1. Right and transverse colon are collapsed though demonstrates mild mucosal hyperenhancement which can be seen in the setting of inflammatory or infectious colitis., 2. Diverticulosis coli with thickening of the sigmoid colon likely secondary to smooth muscle hypertrophy. No evidence of acute diverticulitis., 3. 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

## 2024-04-14 NOTE — ED ATTENDING ATTESTATION
4/14/2024  I, Ciara Bloom MD, saw and evaluated the patient. I have discussed the patient with the resident/non-physician practitioner and agree with the resident's/non-physician practitioner's findings, Plan of Care, and MDM as documented in the resident's/non-physician practitioner's note, except where noted. All available labs and Radiology studies were reviewed.  I was present for key portions of any procedure(s) performed by the resident/non-physician practitioner and I was immediately available to provide assistance.       At this point I agree with the current assessment done in the Emergency Department.  I have conducted an independent evaluation of this patient a history and physical is as follows:    ED Course         Critical Care Time  Procedures    80 yo female with hx of anxiety, sdh, was in the hospital visiting , felt lightheaded and near syncopal. Pt with no cp, no sob, pt with generalized abdominal pain and diarrhea which is improved currently, no palipations. Pt with hx of recurrent falls and last fall few days ago, no fall today. Pt admits to drinking daily and drank more last few days.  Pt with decreased po intake today and yesterday.  Vss, afebrile, lungs cta, rrr, no acute disress, abdomen soft nontender, no focal neuro deficits. Left arm limited rom due to pain.  Labs, ct head, ct a/p, EKG, trop

## 2024-04-14 NOTE — PROGRESS NOTES
Pastoral Care Progress Note    2024  Patient: Karli AHMADI Love : 1945  Admission Date & Time: 2024 1147  MRN: [id number] [facility]: [id number]                     Chaplaincy Interventions Utilized:   Empowerment: Provided anxiety containment    Exploration: Explored hope    Collaboration: Facilitated respect for spiritual/cultural practice during hospitalization    Relationship Building: Listened empathically          Chaplaincy Outcomes Achieved:  Distress reduced and Expressed gratitude          Spiritual Coping Strategies Utilized:   Spiritual comfort, Spiritual empowerment, and Spiritual growth or transformation       24 1100   Clinical Encounter Type   Visited With Patient   Crisis Visit Critical Care  (Medical emergency)     Those past few days patient is getting dizzy and prompt to fall. Provided spiritual and emotional support.

## 2024-04-15 RX ORDER — LEVOTHYROXINE SODIUM 0.05 MG/1
TABLET ORAL
Qty: 45 TABLET | Refills: 0 | Status: SHIPPED | OUTPATIENT
Start: 2024-04-15

## 2024-04-15 RX ORDER — CITALOPRAM HYDROBROMIDE 10 MG/1
10 TABLET ORAL DAILY
Qty: 90 TABLET | Refills: 0 | Status: SHIPPED | OUTPATIENT
Start: 2024-04-15

## 2024-04-15 NOTE — ED PROVIDER NOTES
History  Chief Complaint   Patient presents with    Dizziness     Pt was visiting her  upstair and became dizzy, so staff brought her down to the ER     Patient is a 79 year old female with history of HTN, HLD, hypothyroidism, alcohol abuse, traumatic SDH, recurrent falls, neurologic gait dysfunction, anxiety, and depression who presents for dizziness. Patient states that her  was recently hospitalized for a planned medical procedure. She has been home by herself and has been feeling very stressed and has not been eating or drinking much. She also notes last night she drank about 5 glasses of wine when she normally has 3 per night. She was visiting her  in his hospital room and helping him eat some soup when she had sudden onset of dizziness, nausea, and dry heaves. She was helped into a chair by her son and a medical emergency was called. Patient arrives in the ED for evaluation.    Patient currently states she has a little it of residual dizziness but otherwise feels much better. She denies any chest pain, palpitations, SOB, leg swelling, or weakness. She notes that over the past 2 weeks she has been having bouts of generalized abdominal pain associated with diarrhea. She also notes that over the past several years she has had recurrent mechanical falls Her last fall was 4 days ago during which she fell forward striking her face. She denies any other injuries. She denies any recent medication changes. She notes that a year ago she had a fall and sustained a SDH She was supposed to start outpatient PT twice a week but states she cannot afford it.        Prior to Admission Medications   Prescriptions Last Dose Informant Patient Reported? Taking?   ALPRAZolam (XANAX) 1 mg tablet   No Yes   Sig: Take 1 tablet (1 mg total) by mouth 2 (two) times a day as needed for anxiety   acetaminophen (TYLENOL) 325 mg tablet  Self No No   Sig: Take 2 tablets (650 mg total) by mouth every 6 (six) hours as needed  for mild pain   amLODIPine (NORVASC) 5 mg tablet  Self No Yes   Sig: Take 1 tablet (5 mg total) by mouth daily   citalopram (CeleXA) 10 mg tablet  Self No Yes   Sig: Take 1 tablet (10 mg total) by mouth daily   levothyroxine 50 mcg tablet   No Yes   Sig: take 1 tablet by mouth once daily      Facility-Administered Medications: None       Past Medical History:   Diagnosis Date    Anxiety     Last Assessed: 6/12/2017     Depression     FH: colonic polyps     Glaucoma     Hypertension     Last Assessed: 12/12/2017    Hypothyroidism     Last Assessed: 6/12/2017    Menopause     Normal vaginal delivery     Osteopenia     Pap smear abnormality of cervix with ASCUS favoring benign     Telangiectasis        Past Surgical History:   Procedure Laterality Date    CATARACT EXTRACTION      COLONOSCOPY      complete    DILATION AND CURETTAGE OF UTERUS  01/10/1978    HEMORROIDECTOMY      x2    LAPAROSCOPY      Diagnostic     TUBAL LIGATION         Family History   Problem Relation Age of Onset    Heart disease Mother         Cardiac Disorder     Colonic polyp Mother     Hypertension Mother     Heart disease Father         Cardiac Dsiorder     Hypertension Father     Lung cancer Father     Breast cancer Sister 68    Stomach cancer Sister     Colonic polyp Brother     Lung cancer Brother     Hepatitis Daughter         Alcoholic    Arthritis Family     Lung cancer Family     No Known Problems Maternal Grandmother     No Known Problems Maternal Grandfather     No Known Problems Paternal Grandmother     No Known Problems Paternal Grandfather     No Known Problems Son     Esophageal cancer Sister 56     I have reviewed and agree with the history as documented.    E-Cigarette/Vaping    E-Cigarette Use Never User      E-Cigarette/Vaping Substances    Nicotine No     THC No     CBD No     Flavoring No     Other No     Unknown No      Social History     Tobacco Use    Smoking status: Every Day     Current packs/day: 1.00     Average  packs/day: 1 pack/day for 35.0 years (35.0 ttl pk-yrs)     Types: Cigarettes    Smokeless tobacco: Never   Vaping Use    Vaping status: Never Used   Substance Use Topics    Alcohol use: Yes     Alcohol/week: 21.0 standard drinks of alcohol     Types: 21 Glasses of wine per week     Comment: no more than 3 glasses of wine per day    Drug use: No        Review of Systems   Constitutional:  Negative for chills and fever.   HENT:  Negative for congestion, rhinorrhea and sore throat.    Eyes:  Negative for pain and visual disturbance.   Respiratory:  Negative for cough and shortness of breath.    Cardiovascular:  Negative for chest pain, palpitations and leg swelling.   Gastrointestinal:  Positive for abdominal pain and diarrhea. Negative for constipation, nausea and vomiting.   Genitourinary:  Negative for dysuria and hematuria.   Musculoskeletal:  Negative for back pain and neck pain.   Skin:  Negative for color change and rash.   Neurological:  Positive for dizziness. Negative for seizures, syncope, weakness, numbness and headaches.   All other systems reviewed and are negative.      Physical Exam  ED Triage Vitals [04/14/24 1152]   Temperature Pulse Respirations Blood Pressure SpO2   (!) 97.3 °F (36.3 °C) 84 18 122/65 97 %      Temp Source Heart Rate Source Patient Position - Orthostatic VS BP Location FiO2 (%)   Oral Monitor Sitting Right arm --      Pain Score       No Pain             Orthostatic Vital Signs  Vitals:    04/14/24 1400 04/14/24 1430 04/14/24 1500 04/14/24 1530   BP: 154/69 119/59 120/59 121/59   Pulse: 68 70 68 66   Patient Position - Orthostatic VS: Sitting Sitting Sitting Sitting       Physical Exam  Vitals and nursing note reviewed.   Constitutional:       General: She is not in acute distress.     Appearance: Normal appearance. She is well-developed and normal weight. She is not ill-appearing, toxic-appearing or diaphoretic.   HENT:      Head: Normocephalic.      Comments: Old bruising around  the orbits and nasal bridge without hematoma, lacerations, skull fractures, Raccoon eyes, or Lopez sign     Right Ear: External ear normal.      Left Ear: External ear normal.      Nose: Nose normal. No congestion or rhinorrhea.      Mouth/Throat:      Mouth: Mucous membranes are moist.      Pharynx: Oropharynx is clear. No oropharyngeal exudate or posterior oropharyngeal erythema.   Eyes:      General: No scleral icterus.        Right eye: No discharge.         Left eye: No discharge.      Extraocular Movements: Extraocular movements intact.      Conjunctiva/sclera: Conjunctivae normal.      Pupils: Pupils are equal, round, and reactive to light.   Cardiovascular:      Rate and Rhythm: Normal rate and regular rhythm.      Pulses: Normal pulses.      Heart sounds: Normal heart sounds. No murmur heard.     No friction rub. No gallop.   Pulmonary:      Effort: Pulmonary effort is normal. No respiratory distress.      Breath sounds: Normal breath sounds. No stridor. No wheezing, rhonchi or rales.   Chest:      Chest wall: No tenderness.   Abdominal:      General: Abdomen is flat. Bowel sounds are normal. There is no distension.      Palpations: Abdomen is soft.      Tenderness: There is no abdominal tenderness. There is no right CVA tenderness, left CVA tenderness, guarding or rebound.   Musculoskeletal:         General: No tenderness.      Cervical back: Normal range of motion and neck supple. No tenderness.      Right lower leg: No edema.      Left lower leg: No edema.   Skin:     General: Skin is warm and dry.      Capillary Refill: Capillary refill takes less than 2 seconds.      Coloration: Skin is not jaundiced or pale.   Neurological:      General: No focal deficit present.      Mental Status: She is alert and oriented to person, place, and time. Mental status is at baseline.      Cranial Nerves: No cranial nerve deficit.      Sensory: No sensory deficit.      Motor: Weakness (4/5 strength in the LUE that  "patient states is her baseline from previous injury) present.      Coordination: Coordination normal.   Psychiatric:         Mood and Affect: Mood normal.         Behavior: Behavior normal.         ED Medications  Medications   multi-electrolyte (ISOLYTE-S PH 7.4) bolus 1,000 mL (0 mL Intravenous Stopped 4/14/24 1356)   iohexol (OMNIPAQUE) 350 MG/ML injection (MULTI-DOSE) 100 mL (100 mL Intravenous Given 4/14/24 1417)       Diagnostic Studies  Results Reviewed       Procedure Component Value Units Date/Time    T4, free [[id number]]  (Normal) Collected: 04/14/24 1201    Lab Status: Final result Specimen: Blood from Arm, Left Updated: 04/14/24 1440     Free T4 0.80 ng/dL     Narrative:        \"Therapeutic range for patients medicated with thyroid disorders: 0.61-1.24 ng/dL.\"    Lipase [[id number]]  (Normal) Collected: 04/14/24 1201    Lab Status: Final result Specimen: Blood from Arm, Left Updated: 04/14/24 1329     Lipase 18 u/L     Vitamin B12 [[id number]]  (Abnormal) Collected: 04/14/24 1201    Lab Status: Final result Specimen: Blood from Arm, Left Updated: 04/14/24 1329     Vitamin B-12 1,118 pg/mL     Folate [[id number]]  (Normal) Collected: 04/14/24 1201    Lab Status: Final result Specimen: Blood from Arm, Left Updated: 04/14/24 1329     Folate 7.4 ng/mL     Comprehensive metabolic panel [[id number]]  (Abnormal) Collected: 04/14/24 1201    Lab Status: Final result Specimen: Blood from Arm, Left Updated: 04/14/24 1313     Sodium 135 mmol/L      Potassium 3.6 mmol/L      Chloride 95 mmol/L      CO2 26 mmol/L      ANION GAP 14 mmol/L      BUN 9 mg/dL      Creatinine 0.64 mg/dL      Glucose 134 mg/dL      Calcium 9.7 mg/dL      AST 19 U/L      ALT 10 U/L      Alkaline Phosphatase 63 U/L      Total Protein 7.2 g/dL      Albumin 4.3 g/dL      Total Bilirubin 1.75 mg/dL      eGFR 86 ml/min/1.73sq m     Narrative:      National Kid[employer] Disease Foundation guidelines for Chronic Kidney Disease (CKD):     Stage 1 with normal " or high GFR (GFR > 90 mL/min/1.73 square meters)    Stage 2 Mild CKD (GFR = 60-89 mL/min/1.73 square meters)    Stage 3A Moderate CKD (GFR = 45-59 mL/min/1.73 square meters)    Stage 3B Moderate CKD (GFR = 30-44 mL/min/1.73 square meters)    Stage 4 Severe CKD (GFR = 15-29 mL/min/1.73 square meters)    Stage 5 End Stage CKD (GFR <15 mL/min/1.73 square meters)  Note: GFR calculation is accurate only with a steady state creatinine    TSH [[id number]]  (Abnormal) Collected: 04/14/24 1201    Lab Status: Final result Specimen: Blood from Arm, Left Updated: 04/14/24 1313     TSH 3RD GENERATON 37.313 uIU/mL     HS Troponin 0hr (reflex protocol) [[id number]]  (Normal) Collected: 04/14/24 1201    Lab Status: Final result Specimen: Blood from Arm, Left Updated: 04/14/24 1236     hs TnI 0hr 3 ng/L     CBC and differential [[id number]]  (Abnormal) Collected: 04/14/24 1201    Lab Status: Final result Specimen: Blood from Arm, Left Updated: 04/14/24 1211     WBC 13.17 Thousand/uL      RBC 4.93 Million/uL      Hemoglobin 16.4 g/dL      Hematocrit 48.2 %      MCV 98 fL      MCH 33.3 pg      MCHC 34.0 g/dL      RDW 14.6 %      MPV 9.9 fL      Platelets 272 Thousands/uL      nRBC 0 /100 WBCs      Segmented % 77 %      Immature Grans % 0 %      Lymphocytes % 14 %      Monocytes % 8 %      Eosinophils Relative 0 %      Basophils Relative 1 %      Absolute Neutrophils 10.10 Thousands/µL      Absolute Immature Grans 0.04 Thousand/uL      Absolute Lymphocytes 1.85 Thousands/µL      Absolute Monocytes 1.11 Thousand/µL      Eosinophils Absolute 0.01 Thousand/µL      Basophils Absolute 0.06 Thousands/µL                    CT head wo contrast   Final Result by Esteban Medrano MD (04/14 1521)      No acute intracranial abnormality.  Stable chronic microangiopathic changes within the brain.                  Workstation performed: [id number]         CT abdomen pelvis with contrast   Final Result by Eyad Bosch MD (04/14 8015)      1.  Right and  transverse colon are collapsed though demonstrates mild mucosal hyperenhancement which can be seen in the setting of inflammatory or infectious colitis.      2.  Diverticulosis coli with thickening of the sigmoid colon likely secondary to smooth muscle hypertrophy. No evidence of acute diverticulitis.      3.  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      The study was marked in EPIC for immediate notification.         Workstation performed: [id number]         XR chest 2 views   ED Interpretation by Ovi Thrasher MD (04/14 1259)   No acute cardiopulmonary abnormalities.      Final Result by Laly Kelsey MD ([id number])      No acute cardiopulmonary disease.               Workstation performed: [id number]               Procedures  Procedures      ED Course  ED Course as of 04/15/24 1751   Sun Apr 14, 2024   1333 TSH 3RD GENERATON(!): 37.313   1333 Vitamin B-12(!): 1,118   1411 This EKG interpreted by me. Rate 75, rhythm sinus, normal axis, intervals normal, no acute ST or T wave changes, iRBBB, limited by artifact                 Identification of Seniors at Risk      Flowsheet Row Most Recent Value   (ISAR) Identification of Seniors at Risk    Before the illness or injury that brought you to the Emergency, did you need someone to help you on a regular basis? 0 Filed at: 04/14/2024 1154   In the last 24 hours, have you needed more help than usual? 0 Filed at: 04/14/2024 1154   Have you been hospitalized for one or more nights during the past 6 months? 1 Filed at: 04/14/2024 1154   In general, do you see well? 0 Filed at: 04/14/2024 1154   In general, do you have serious problems with your memory? 0 Filed at: 04/14/2024 1154   Do you take more than three different medications every day? 1 Filed at: 04/14/2024 1154   ISAR Score 2 Filed at: 04/14/2024 1154                      SBIRT 22yo+      Flowsheet Row Most Recent Value    Initial Alcohol Screen: US AUDIT-C     1. How often do you have a drink containing alcohol? 6 Filed at: 04/14/2024 1154   2. How many drinks containing alcohol do you have on a typical day you are drinking?  4 Filed at: 04/14/2024 1154   3a. Male UNDER 65: How often do you have five or more drinks on one occasion? 0 Filed at: 04/14/2024 1154   3b. FEMALE Any Age, or MALE 65+: How often do you have 4 or more drinks on one occassion? 6 Filed at: 04/14/2024 1154   Audit-C Score 16 Filed at: 04/14/2024 1154   Full Alcohol Screen: US AUDIT    4. How often during the last year have you found that you were not able to stop drinking once you had started? 0 Filed at: 04/14/2024 1154   5. How often during past year have you failed to do what was normally expected of you because of drinking?  0 Filed at: 04/14/2024 1154   6. How often in past year have you needed a first drink in the morning to get yourself going after a heavy drinking session?  0 Filed at: 04/14/2024 1154   7. How often in past year have you had feeling of guilt or remorse after drinking?  0 Filed at: 04/14/2024 1154   8. How often in past year have you been unable to remember what happened night before because you had been drinking?  0 Filed at: 04/14/2024 1154   9. Have you or someone else been injured as a result of your drinking?  0 Filed at: 04/14/2024 1154   10. Has a relative, friend, doctor or other health worker been concerned about your drinking and suggested you cut down?  0 Filed at: 04/14/2024 1154   AUDIT Total Score 16 Filed at: 04/14/2024 1154   JAYLAN: How many times in the past year have you...    Used an illegal drug or used a prescription medication for non-medical reasons? Never Filed at: 04/14/2024 1154                  Medical Decision Making  Patient is a 79 year old female with history of HTN. HLD, hypothyroidism, alcohol abuse, traumatic SDH, neurologic gait dysfunction, anxiety, and depression who presents for dizziness.    Ddx  includes but is not limited to orthostatic hypotension, vasovagal episode, ACS, arrhythmia, electrolyte abnormality, malnutrition, AAA, colitis, dehydration. Patient reports near syncopal episode with prodrome of lightheadedness, nausea, and dianna heaving. Also endorses 2 weeks of abdominal pain with diarrhea and a long history of recurrent mechanical falls. I doubt PE given lack of chest pain or SOB. I doubt ectopic pregnancy given patient's age. Will obtain cardiac workup, TSH, B12, folate, CT head, CT AP, CXR. Will give fluids for suspected dehydration.    Patient's labs are notable for elevated TSH but normal T4. CBC shows signs of hemoconcentration. She has an anion gap of 14 which I suspect is due to starvation ketosis from her alcoholism and recent poor appetite. Her CXR on my interpretation shows no acute abnormalities, Her EKG shows normal sinus rhythm. Her CT imaging of her head is normal. Her CT AP shows colitis and aortic mural thrombus that is slightly increased in size from CT in 2011. I discussed these findings at length with patient. She is ambulatory with normal gait. Provided with referral to vascular surgery to discuss aortic thrombus. Advised her to follow up with her PCP to discuss ehr TSH and to be reevaluated for her symptoms. Counseled on alcohol cessation. Return precautions given, all questions answered.    Amount and/or Complexity of Data Reviewed  Labs: ordered. Decision-making details documented in ED Course.  Radiology: ordered and independent interpretation performed.    Risk  Prescription drug management.          Disposition  Final diagnoses:   Postural dizziness with presyncope   Colitis   Aortic thrombus (HCC)   Elevated TSH     Time reflects when diagnosis was documented in both MDM as applicable and the Disposition within this note       Time User Action Codes Description Comment    4/14/2024  4:00 PM Ovi Thrasher Add [R42,  R55] Postural dizziness with presyncope     4/14/2024   4:00 PM Ovi Thrasher Add [K52.9] Colitis     4/14/2024  4:00 PM Ovi Thrasher Add [I74.10] Aortic thrombus (HCC)     4/14/2024  4:00 PM Ovi Thrasher Add [R79.89] Elevated TSH           ED Disposition       ED Disposition   Discharge    Condition   Stable    Date/Time   Sun Apr 14, 2024 1600    Comment   Karli Maradiaga discharge to home/self care.                   Follow-up Information       Follow up With Specialties Details Why Contact Info Additional Information    Jasson Salinas MD Family Medicine Schedule an appointment as soon as possible for a visit in 1 week  [address] [place] [id number] [phone]       [facility] Emergency Department Emergency Medicine Go to  If symptoms worsen or if you have any other specific concerns [address] [place] [id number] [phone] [facility] Emergency Department, [address] [place] [address], [id number] [phone]            Discharge Medication List as of 4/14/2024  4:02 PM        CONTINUE these medications which have NOT CHANGED    Details   ALPRAZolam (XANAX) 1 mg tablet Take 1 tablet (1 mg total) by mouth 2 (two) times a day as needed for anxiety, Starting Fri 12/1/2023, Normal      amLODIPine (NORVASC) 5 mg tablet Take 1 tablet (5 mg total) by mouth daily, Starting Thu 4/20/2023, Normal      citalopram (CeleXA) 10 mg tablet Take 1 tablet (10 mg total) by mouth daily, Starting Thu 4/20/2023, Normal      levothyroxine 50 mcg tablet take 1 tablet by mouth once daily, Starting Wed 10/18/2023, Normal      acetaminophen (TYLENOL) 325 mg tablet Take 2 tablets (650 mg total) by mouth every 6 (six) hours as needed for mild pain, Starting Tue 2/14/2023, No Print               PDMP Review         Value Time User    PDMP Reviewed  Yes 4/20/2023  5:19 PM OLI Mena             ED Provider  Attending physically available and evaluated Karli Maradiaga. I managed the patient along with the ED  Attending.    Electronically Signed by           Ovi Thrasher MD  04/15/24 9910

## 2024-04-16 NOTE — TELEPHONE ENCOUNTER
Karli returned our call. I relayed recent message and repeated instructions for patient. She stated that she understood and had no further questions. I also scheduled her for April 30th for an ED follow up. Patient stated that her  is in the hospital at the moment and she did not want to schedule until she knows he will be home.

## 2024-04-16 NOTE — TELEPHONE ENCOUNTER
Please contact patient.  I refilled her citalopram and levothyroxine.    She was seen in emergency room yesterday and her thyroid function was underactive.    I change dose of levothyroxine from 50 mcg once a day to 75 mcg (1.5 tablets) on Mondays, Wednesdays and Fridays and keep 50 mcg daily for the rest of the week.    Patient should be scheduled for follow-up with Dr. Salinas.    Thank you

## 2024-04-17 ENCOUNTER — APPOINTMENT (EMERGENCY)
Dept: RADIOLOGY | Facility: HOSPITAL | Age: 79
End: 2024-04-17
Payer: COMMERCIAL

## 2024-04-17 ENCOUNTER — HOSPITAL ENCOUNTER (EMERGENCY)
Facility: HOSPITAL | Age: 79
Discharge: HOME | End: 2024-04-17
Attending: EMERGENCY MEDICINE
Payer: COMMERCIAL

## 2024-04-17 VITALS
OXYGEN SATURATION: 91 % | WEIGHT: 90 LBS | HEIGHT: 62 IN | TEMPERATURE: 97.4 F | RESPIRATION RATE: 18 BRPM | SYSTOLIC BLOOD PRESSURE: 119 MMHG | DIASTOLIC BLOOD PRESSURE: 60 MMHG | BODY MASS INDEX: 16.56 KG/M2 | HEART RATE: 68 BPM
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
## 2024-04-17 PROCEDURE — 99283 EMERGENCY DEPT VISIT LOW MDM: CPT

## 2024-04-17 PROCEDURE — 70450 CT HEAD/BRAIN W/O DYE: CPT

## 2024-04-17 PROCEDURE — 99284 EMERGENCY DEPT VISIT MOD MDM: CPT | Performed by: EMERGENCY MEDICINE

## 2024-04-17 NOTE — ED ATTENDING ATTESTATION
4/17/2024  I, Wenceslao Fontaine DO, saw and evaluated the patient. I have discussed the patient with the resident/non-physician practitioner and agree with the resident's/non-physician practitioner's findings, Plan of Care, and MDM as documented in the resident's/non-physician practitioner's note, except where noted. All available labs and Radiology studies were reviewed.  I was present for key portions of any procedure(s) performed by the resident/non-physician practitioner and I was immediately available to provide assistance.       At this point I agree with the current assessment done in the Emergency Department.  I have conducted an independent evaluation of this patient a history and physical is as follows:    Patient is a 79-year-old female history of hypertension, hyperlipidemia, hypothyroidism, traumatic subdural hematoma over 1 year ago, accompanied by her son.  The patient normally lives with her , ambulates independently but the  has been recently hospitalized 4 days ago and the son has been living with the patient.  Earlier today the patient was in the 's room in the hospital, was sitting in a wheelchair, stood up, fell, was caught by the son but there was a light head strike.  There was no significant trauma.  The patient herself says she feels fine, she was not lightheaded or vertiginous prior to the fall.  No chest pain, no palpitation, no headache, no blurred vision, no double vision, no numbness or tingling.  No dysuria, no hematuria.  The patient has been having frequent falls, as well as episodes of lightheadedness.  She did strike her head and face slightly about a week ago, and then was seen in the Emergency Department 3 days ago for lightheaded and dizziness.  That workup included a unremarkable head ct, ECG, comprehensive metabolic panel, CBC, troponin, TSH was slightly elevated.  Patient felt comfortable with discharge and outpatient follow-up.  The patient herself says  that she has 2 walkers at home that she does not use, the  has a walker as well that she can use, and that she would like to go home.  The son says that the patient's facial ecchymosis that she has right now is from the fall about a week ago, she has no change in that ecchymosis    General:  Patient is well-appearing  Head:  Atraumatic  Eyes:  Conjunctiva pink, Extraocular muscle intact, no periorbital ecchymosis, PERRL  ENT:  Mucous membranes are moist, no dental malocclusion, no craniofacial instability, no Lopez signs, she has some mild ecchymosis to her face, no nasal tenderness or deformity, no septal hematoma or bleeding  Neck:  No midline tenderness or step-offs or deformities  Cardiac:  S1-S2, without murmurs, no chest wall tenderness  Lungs:  Clear to auscultation bilaterally  Abdomen:  Soft, nontender, normal bowel sounds, no CVA tenderness, no tympany, no rigidity, no guarding  Extremities:  No bony tenderness to the bilateral bilateral humeral heads, humerus, elbows, radius, ulna, hands, hips, femurs, knees, tibia, fibula, feet. No pain with passive range of motion at the bilateral shoulders, elbows, wrists, hips, knees, or ankles.  Back: No midline thoracic, lumbar, sacral tenderness, deformities, or step-offs.  Neurologic:  Awake, fluent speech, normal comprehension, AAOx3, No deficit on finger to nose testing, no pronator drift, cranial nerves II through XII are intact, no facial droop, no slurred speech, normal sensation, strength 5/5 in b/l upper & lower extremities.  Skin:  Pink warm and dry  Psychiatric:  Alert, pleasant, cooperative      ED Course  ED Course as of 04/17/24 1511   Wed Apr 17, 2024   1350 On reassessment, patient able to ambulate with a walker independently without difficulty, said she felt very comfortable ambulating and going home, and would use her walker all the time.     CT head without contrast   Final Result      No acute intracranial abnormality.                   Workstation performed: [id number]             I suspect the patient had a mechanical fall today, unclear the cause of her frequent falls earlier however she feels comfortable being discharged home with a walker, her son will be there to care for her.  I do not believe that she requires repeat of her laboratory studies that was done 2 days ago.  There is no evidence of life-threatening intracranial hemorrhage from the fall today.    DIAGNOSIS:  Acute fall, acute closed head injury, history of frequent falls    MEDICAL DECISION MAKING CODING    Patient presents with acute new problem with:  Threat to life or bodily function    Chronic conditions affecting care: As per HPI    COLLECTION AND INTERPRETATION OF DATA  Additional history obtained from: Son  I reviewed prior external notes, including head CT April 14, 2024, October 12, 2023 family medicine office visit    I ordered each unique test  Tests reviewed personally by me:  Imaging: I independently reviewed the head CT and found no acute pathology.    Tests considered but not ordered: See above    RISK  All of the patient's current prescription medications should be continued.    Surgery  -I considered surgery may be necessary prior to completion of the work up but afterwards there is no indication for immediate surgery        Critical Care Time  Procedures

## 2024-04-17 NOTE — ED PROVIDER NOTES
History  Chief Complaint   Patient presents with    Fall     Pt was visiting  and had a fall. Denies LOC, +HS, denies thinners. Pt reports frequent falls     HPI  79-year-old female with past medical history of hypertension, hyperlipidemia, subdural hematoma greater than 1 year ago who presents for a fall.  Patient was visiting her 's room in the hospital when she stood up from her wheelchair, and fell forward.  She was caught by her son, but reports that she had a small head strike.  She denies any significant trauma to her head and denies any current headache or pain.  Patient states that she did not feel lightheaded or off balance before the fall.  She denies any preceding chest pain, shortness of breath, palpitations, nausea, vomiting, vision changes, weakness, numbness.  Patient states she was visiting her  in this hospital, who was hospitalized 4 days ago.  She states she lives with her  and ambulates without a walker at home.  She states her son has been staying with her while her  is in the hospital.  Patient was evaluated at this emergency department 3 days ago for lightheadedness and dizziness after a fall where she had a head strike approximately 1 week ago.  Workup included ECG, CMP, CBC, troponin, and a unremarkable head CT.  Patient was discharged to home.  Patient states that she has 2 walkers at home, but has not used them.  Patient states she would like to go home    Prior to Admission Medications   Prescriptions Last Dose Informant Patient Reported? Taking?   ALPRAZolam (XANAX) 1 mg tablet   No No   Sig: Take 1 tablet (1 mg total) by mouth 2 (two) times a day as needed for anxiety   acetaminophen (TYLENOL) 325 mg tablet  Self No No   Sig: Take 2 tablets (650 mg total) by mouth every 6 (six) hours as needed for mild pain   amLODIPine (NORVASC) 5 mg tablet  Self No No   Sig: Take 1 tablet (5 mg total) by mouth daily   citalopram (CeleXA) 10 mg tablet   No No   Sig:  take 1 tablet by mouth once daily   levothyroxine 50 mcg tablet   No No   Sig: Take 1.5 tablets (75 mcg) on Mondays, Wednesdays and Fridays, take 1 tablet for the rest of the week.      Facility-Administered Medications: None       Past Medical History:   Diagnosis Date    Anxiety     Last Assessed: 6/12/2017     COPD (chronic obstructive pulmonary disease) (HCC)     Depression     FH: colonic polyps     Glaucoma     Hypertension     Last Assessed: 12/12/2017    Hypothyroidism     Last Assessed: 6/12/2017    Menopause     Normal vaginal delivery     Osteopenia     Pap smear abnormality of cervix with ASCUS favoring benign     Telangiectasis        Past Surgical History:   Procedure Laterality Date    CATARACT EXTRACTION      COLONOSCOPY      complete    DILATION AND CURETTAGE OF UTERUS  01/10/1978    HEMORROIDECTOMY      x2    LAPAROSCOPY      Diagnostic     TUBAL LIGATION         Family History   Problem Relation Age of Onset    Heart disease Mother         Cardiac Disorder     Colonic polyp Mother     Hypertension Mother     Heart disease Father         Cardiac Dsiorder     Hypertension Father     Lung cancer Father     Breast cancer Sister 68    Stomach cancer Sister     Colonic polyp Brother     Lung cancer Brother     Hepatitis Daughter         Alcoholic    Arthritis Family     Lung cancer Family     No Known Problems Maternal Grandmother     No Known Problems Maternal Grandfather     No Known Problems Paternal Grandmother     No Known Problems Paternal Grandfather     No Known Problems Son     Esophageal cancer Sister 56     I have reviewed and agree with the history as documented.    E-Cigarette/Vaping    E-Cigarette Use Never User      E-Cigarette/Vaping Substances    Nicotine No     THC No     CBD No     Flavoring No     Other No     Unknown No      Social History     Tobacco Use    Smoking status: Every Day     Current packs/day: 1.00     Average packs/day: 1 pack/day for 35.0 years (35.0 ttl pk-yrs)      Types: Cigarettes    Smokeless tobacco: Never   Vaping Use    Vaping status: Never Used   Substance Use Topics    Alcohol use: Yes     Alcohol/week: 21.0 standard drinks of alcohol     Types: 21 Glasses of wine per week     Comment: no more than 3 glasses of wine per day    Drug use: No        Review of Systems   Constitutional:  Negative for chills and fever.   HENT:  Negative for nosebleeds.    Eyes:  Negative for visual disturbance.   Respiratory:  Negative for cough and shortness of breath.    Cardiovascular:  Negative for chest pain, palpitations and leg swelling.   Gastrointestinal:  Negative for abdominal pain, nausea and vomiting.   Genitourinary:  Negative for difficulty urinating.   Musculoskeletal:  Negative for back pain and neck pain.   Skin:  Negative for wound.   Neurological:  Negative for dizziness, syncope, weakness, light-headedness, numbness and headaches.   Psychiatric/Behavioral:  Negative for behavioral problems and confusion.        Physical Exam  ED Triage Vitals [04/17/24 1201]   Temperature Pulse Respirations Blood Pressure SpO2   (!) 97.4 °F (36.3 °C) 70 18 119/62 91 %      Temp Source Heart Rate Source Patient Position - Orthostatic VS BP Location FiO2 (%)   Oral Monitor Lying Right arm --      Pain Score       No Pain             Orthostatic Vital Signs  Vitals:    04/17/24 1201 04/17/24 1330   BP: 119/62 119/60   Pulse: 70 68   Patient Position - Orthostatic VS: Lying Lying       Physical Exam  Vitals and nursing note reviewed.   Constitutional:       General: She is not in acute distress.     Appearance: Normal appearance. She is normal weight. She is not ill-appearing or toxic-appearing.   HENT:      Head: Normocephalic.      Comments: Healing ecchymosis on the bilateral cheeks.  No Lopez sign, no raccoon eyes.  No lacerations or abrasions, or hematoma     Right Ear: Tympanic membrane, ear canal and external ear normal.      Left Ear: Tympanic membrane, ear canal and external ear  normal.      Nose: No rhinorrhea.      Mouth/Throat:      Mouth: Mucous membranes are moist.      Pharynx: Oropharynx is clear.   Eyes:      Extraocular Movements: Extraocular movements intact.      Conjunctiva/sclera: Conjunctivae normal.      Pupils: Pupils are equal, round, and reactive to light.   Cardiovascular:      Rate and Rhythm: Normal rate and regular rhythm.      Pulses: Normal pulses.      Heart sounds: Normal heart sounds.   Pulmonary:      Effort: Pulmonary effort is normal. No respiratory distress.      Breath sounds: Normal breath sounds. No wheezing.   Abdominal:      General: There is no distension.      Palpations: Abdomen is soft.      Tenderness: There is no abdominal tenderness.   Musculoskeletal:         General: No tenderness.      Cervical back: Neck supple. No tenderness.      Right lower leg: No edema.      Left lower leg: No edema.      Comments: No tenderness to palpation of cervical, thoracic, lumbar spine   Skin:     General: Skin is warm and dry.   Neurological:      General: No focal deficit present.      Mental Status: She is alert and oriented to person, place, and time.      Cranial Nerves: No cranial nerve deficit.      Comments: 4/5 strength in b/l lower extremities         ED Medications  Medications - No data to display    Diagnostic Studies  Results Reviewed       None                   CT head without contrast   Final Result by Kyaw Martel MD (04/17 1315)      No acute intracranial abnormality.                  Workstation performed: [id number]               Procedures  Procedures      ED Course  ED Course as of 04/17/24 2311   Wed Apr 17, 2024   1229 Nexus negative                             SBIRT 20yo+      Flowsheet Row Most Recent Value   Initial Alcohol Screen: US AUDIT-C     1. How often do you have a drink containing alcohol? 6 Filed at: 04/17/2024 1203   Audit-C Score 6 Filed at: 04/17/2024 1203   JAYLAN: How many times in the past year have you...    Used an  illegal drug or used a prescription medication for non-medical reasons? Monthly  [marijuana] Filed at: 04/17/2024 1203                  Medical Decision Making  79-year-old female with past medical history multiple falls presents to ED for evaluation of fall today.  Fall appears to be mechanical in nature as the patient was getting up from a wheelchair and fell forward.  She was caught by her son, but states that she had small head strike.  Does not complain of any symptoms at this time.  Denies headache, vision changes, weakness, numbness, dizziness, lightheadedness.    On physical exam, patient has healing ecchymosis on the bilateral cheeks, which her son states is old from a previous fall.  No Lopez sign, no raccoon eyes, no tenderness to palpation of the forehead or scalp.  No new lacerations.  No tenderness palpation of the cervical, thoracic, lumbar spine.  4 out of 5 strength in the lower extremities.  Cranial nerves II through XII intact.  Pupils equal round reactive to light.  Patient oriented x 3.  Patient observed to ambulate with walker without difficulty.     I suspect mechanical fall given lack of preceding symptoms    Plan: Will order CT head to rule out intracranial hemorrhage, CT cervical spine to rule out acute fracture or misalignment.   CT head negative for intracranial hemorrhage and CT cervical spine negative for acute fracture.  I observed the patient walk with a walker.  She is states that she wants to go home.  She is currently living with her son who can watch over her.  The patient agreed to walk around her home with a walker, of which she has two.  I discussed the necessity of coming to the emergency department if the patient has further falls.  Patient states she understands.    Amount and/or Complexity of Data Reviewed  Radiology: ordered.          Disposition  Final diagnoses:   Fall, initial encounter     Time reflects when diagnosis was documented in both MDM as applicable and the  Disposition within this note       Time User Action Codes Description Comment    4/17/2024  2:02 PM Jay Jang [W19.XXXA] Fall, initial encounter           ED Disposition       ED Disposition   Discharge    Condition   Stable    Date/Time   Wed Apr 17, 2024 1403    Comment   Karli Maradiaga discharge to home/self care.                   Follow-up Information    None         Discharge Medication List as of 4/17/2024  2:04 PM        CONTINUE these medications which have NOT CHANGED    Details   acetaminophen (TYLENOL) 325 mg tablet Take 2 tablets (650 mg total) by mouth every 6 (six) hours as needed for mild pain, Starting Tue 2/14/2023, No Print      ALPRAZolam (XANAX) 1 mg tablet Take 1 tablet (1 mg total) by mouth 2 (two) times a day as needed for anxiety, Starting Fri 12/1/2023, Normal      amLODIPine (NORVASC) 5 mg tablet Take 1 tablet (5 mg total) by mouth daily, Starting Thu 4/20/2023, Normal      citalopram (CeleXA) 10 mg tablet take 1 tablet by mouth once daily, Starting Mon 4/15/2024, Normal      levothyroxine 50 mcg tablet Take 1.5 tablets (75 mcg) on Mondays, Wednesdays and Fridays, take 1 tablet for the rest of the week., Normal           No discharge procedures on file.    PDMP Review         Value Time User    PDMP Reviewed  Yes 4/20/2023  5:19 PM OLI Mena             ED Provider  Attending physically available and evaluated Karli Maradiaga. I managed the patient along with the ED Attending.    Electronically Signed by           Jay Jang DO  04/17/24 0473

## 2024-04-17 NOTE — DISCHARGE INSTRUCTIONS
Given evaluated in our emergency department after a fall.  We are discharging home, on the condition that you walk with a walker.  Return to the emergency department if you have another fall

## 2024-04-30 ENCOUNTER — OFFICE VISIT (OUTPATIENT)
Dept: FAMILY MEDICINE CLINIC | Facility: CLINIC | Age: 79
End: 2024-04-30
Payer: COMMERCIAL

## 2024-04-30 VITALS
DIASTOLIC BLOOD PRESSURE: 60 MMHG | SYSTOLIC BLOOD PRESSURE: 110 MMHG | TEMPERATURE: 96.2 F | RESPIRATION RATE: 18 BRPM | OXYGEN SATURATION: 95 % | BODY MASS INDEX: 17.53 KG/M2 | WEIGHT: 95.25 LBS | HEIGHT: 62 IN | HEART RATE: 64 BPM

## 2024-04-30 DIAGNOSIS — I10 PRIMARY HYPERTENSION: ICD-10-CM

## 2024-04-30 DIAGNOSIS — E03.9 HYPOTHYROIDISM, UNSPECIFIED TYPE: Primary | ICD-10-CM

## 2024-04-30 DIAGNOSIS — F41.9 ANXIETY: ICD-10-CM

## 2024-04-30 DIAGNOSIS — F10.10 ALCOHOL ABUSE: ICD-10-CM

## 2024-04-30 DIAGNOSIS — R55 SYNCOPE, UNSPECIFIED SYNCOPE TYPE: ICD-10-CM

## 2024-04-30 PROBLEM — S06.5X9A TRAUMATIC SUBDURAL HEMORRHAGE WITH LOSS OF CONSCIOUSNESS OF UNSPECIFIED DURATION, INITIAL ENCOUNTER (HCC): Status: ACTIVE | Noted: 2024-04-30

## 2024-04-30 PROBLEM — M79.89 SWELLING OF LEFT HAND: Status: RESOLVED | Noted: 2023-07-21 | Resolved: 2024-04-30
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
## 2024-04-30 PROCEDURE — 1159F MED LIST DOCD IN RCRD: CPT | Performed by: FAMILY MEDICINE

## 2024-04-30 PROCEDURE — 1160F RVW MEDS BY RX/DR IN RCRD: CPT | Performed by: FAMILY MEDICINE

## 2024-04-30 PROCEDURE — G2211 COMPLEX E/M VISIT ADD ON: HCPCS | Performed by: FAMILY MEDICINE

## 2024-04-30 PROCEDURE — 3725F SCREEN DEPRESSION PERFORMED: CPT | Performed by: FAMILY MEDICINE

## 2024-04-30 PROCEDURE — 99214 OFFICE O/P EST MOD 30 MIN: CPT | Performed by: FAMILY MEDICINE

## 2024-04-30 RX ORDER — LEVOTHYROXINE SODIUM 0.07 MG/1
75 TABLET ORAL
Qty: 90 TABLET | Refills: 1 | Status: SHIPPED | OUTPATIENT
Start: 2024-04-30

## 2024-04-30 NOTE — ASSESSMENT & PLAN NOTE
Hypothyroidism.  Patient will continue with 75 mg levothyroxine and repeat blood work for TSH in 2 months

## 2024-04-30 NOTE — PROGRESS NOTES
FAMILY PRACTICE OFFICE VISIT       NAME: Karli Maradiaga  AGE: 79 y.o. SEX: female       : 1945        MRN: [id number]    DATE: 2024  TIME: 12:44 PM    Assessment and Plan     Problem List Items Addressed This Visit       Hypertension     Hypertension.  The patient's blood pressure is stable at this time and he will continue current regimen of medications         Hypothyroidism - Primary     Hypothyroidism.  Patient will continue with 75 mg levothyroxine and repeat blood work for TSH in 2 months         Relevant Medications    levothyroxine (Euthyrox) 75 mcg tablet    Other Relevant Orders    TSH, 3rd generation    Anxiety     Anxiety.  Patient continues to use Xanax periodically for breakthrough anxiety         Alcohol abuse     Alcohol abuse.  Patient states she has stopped drinking altogether since being evaluated in the hospital.         Syncope     Syncope.  Etiology unknown at this time.  Patient was given prescription to obtain 48-hour Holter monitor for further evaluation of possible cardiac arrhythmia.         Relevant Orders    Holter monitor           Chief Complaint     Chief Complaint   Patient presents with    Follow-up       History of Present Illness     I reviewed the patient's report from her most recent emergency room visits.  Patient appears to have had 2 mechanical falls.  Patient states at home she has had 2 other episodes where she may have been in the kitchen and just has no warning and feels like she falls to the ground.  She denies any loss of consciousness or significant injury.  She denies any prodromal chest pain, shortness of breath, aura.  She states that since her  was recently hospitalized for cirrhosis she has given up drinking her usual 2 to 3 glasses of wine per day.  She also has stopped smoking but has been trying to use nicotine patches over-the-counter.  Unfortunately patches are causing itching on her skin and patient states she may just quit cold turkey  without any patches.  Patient was noted to have elevated TSH at 37 despite her using levothyroxine 50 mcg tablet once daily.  Patient states she has been compliant with taking medication.        Review of Systems   Review of Systems   Constitutional:  Positive for fatigue. Negative for fever.   Respiratory: Negative.     Cardiovascular: Negative.    Gastrointestinal: Negative.    Genitourinary: Negative.    Musculoskeletal: Negative.    Neurological:         As per HPI     Psychiatric/Behavioral:  The patient is nervous/anxious.        Active Problem List     Patient Active Problem List   Diagnosis    Hypertension    Anxiety    Hypothyroidism    Depression, recurrent ([facility])    Diarrhea    Hemorrhoids    SDH (subdural hematoma) ([facility])    Anxiety    Alcohol abuse    Neurologic gait dysfunction    Traumatic subdural hemorrhage with loss of consciousness of unspecified duration, initial encounter ([facility])    Syncope       Past Medical History:  Past Medical History:   Diagnosis Date    Anxiety     Last Assessed: 6/12/2017     COPD (chronic obstructive pulmonary disease) ([facility])     Depression     FH: colonic polyps     Glaucoma     Hypertension     Last Assessed: 12/12/2017    Hypothyroidism     Last Assessed: 6/12/2017    Menopause     Normal vaginal delivery     Osteopenia     Pap smear abnormality of cervix with ASCUS favoring benign     Telangiectasis        Past Surgical History:  Past Surgical History:   Procedure Laterality Date    CATARACT EXTRACTION      COLONOSCOPY      complete    DILATION AND CURETTAGE OF UTERUS  01/10/1978    HEMORROIDECTOMY      x2    LAPAROSCOPY      Diagnostic     TUBAL LIGATION         Family History:  Family History   Problem Relation Age of Onset    Heart disease Mother         Cardiac Disorder     Colonic polyp Mother     Hypertension Mother     Heart disease Father         Cardiac Dsiorder     Hypertension Father     Lung cancer Father     Breast cancer Sister 68    Stomach cancer Sister      Colonic polyp Brother     Lung cancer Brother     Hepatitis Daughter         Alcoholic    Arthritis Family     Lung cancer Family     No Known Problems Maternal Grandmother     No Known Problems Maternal Grandfather     No Known Problems Paternal Grandmother     No Known Problems Paternal Grandfather     No Known Problems Son     Esophageal cancer Sister 56       Social History:  Social History     Socioeconomic History    Marital status: /Civil Union     Spouse name: Not on file    Number of children: Not on file    Years of education: Not on file    Highest education level: Not on file   Occupational History    Not on file   Tobacco Use    Smoking status: Every Day     Current packs/day: 1.00     Average packs/day: 1 pack/day for 35.0 years (35.0 ttl pk-yrs)     Types: Cigarettes    Smokeless tobacco: Never   Vaping Use    Vaping status: Never Used   Substance and Sexual Activity    Alcohol use: Yes     Alcohol/week: 21.0 standard drinks of alcohol     Types: 21 Glasses of wine per week     Comment: no more than 3 glasses of wine per day    Drug use: No    Sexual activity: Yes   Other Topics Concern    Not on file   Social History Narrative    ** Merged History Encounter **         Drinks Coffee - 1-2 cup a day   Exercises Daily      Social Determinants of Health     Financial Resource Strain: Low Risk  (10/12/2023)    Overall Financial Resource Strain (CARDIA)     Difficulty of Paying Living Expenses: Not very hard   Food Insecurity: No Food Insecurity (2/14/2023)    Hunger Vital Sign     Worried About Running Out of Food in the Last Year: Never true     Ran Out of Food in the Last Year: Never true   Transportation Needs: No Transportation Needs (10/12/2023)    PRAPARE - Transportation     Lack of Transportation (Medical): No     Lack of Transportation (Non-Medical): No   Physical Activity: Not on file   Stress: Not on file   Social Connections: Not on file   Intimate Partner Violence: Not on file    Housing Stability: Low Risk  (2/14/2023)    Housing Stability Vital Sign     Unable to Pay for Housing in the Last Year: No     Number of Places Lived in the Last Year: 1     Unstable Housing in the Last Year: No       Objective     Vitals:    04/30/24 1007   BP: 110/60   Pulse: 64   Resp: 18   Temp: (!) 96.2 °F (35.7 °C)   SpO2: 95%     Wt Readings from Last 3 Encounters:   04/30/24 43.2 kg (95 lb 4 oz)   04/17/24 40.8 kg (90 lb)   10/12/23 41 kg (90 lb 6 oz)       Physical Exam  Constitutional:       General: She is not in acute distress.     Appearance: Normal appearance. She is not ill-appearing.   HENT:      Head: Normocephalic and atraumatic.   Eyes:      General:         Right eye: No discharge.         Left eye: No discharge.      Extraocular Movements: Extraocular movements intact.      Conjunctiva/sclera: Conjunctivae normal.      Pupils: Pupils are equal, round, and reactive to light.   Neck:      Vascular: No carotid bruit.   Cardiovascular:      Rate and Rhythm: Normal rate and regular rhythm.      Heart sounds: Normal heart sounds. No murmur heard.  Pulmonary:      Effort: Pulmonary effort is normal.      Breath sounds: Normal breath sounds. No wheezing, rhonchi or rales.   Abdominal:      General: Abdomen is flat. Bowel sounds are normal. There is no distension.      Palpations: Abdomen is soft.      Tenderness: There is no abdominal tenderness. There is no guarding or rebound.   Musculoskeletal:      Right lower leg: No edema.      Left lower leg: No edema.   Lymphadenopathy:      Cervical: No cervical adenopathy.   Skin:     Findings: No rash.   Neurological:      General: No focal deficit present.      Mental Status: She is alert and oriented to person, place, and time.      Cranial Nerves: No cranial nerve deficit.   Psychiatric:         Mood and Affect: Mood normal.         Behavior: Behavior normal.         Thought Content: Thought content normal.         Judgment: Judgment normal.  "        Pertinent Laboratory/Diagnostic Studies:  Lab Results   Component Value Date    GLUCOSE 115 02/12/2023    BUN 9 04/14/2024    CREATININE 0.64 04/14/2024    CALCIUM 9.7 04/14/2024     06/26/2017    K 3.6 04/14/2024    CO2 26 04/14/2024    CL 95 (L) 04/14/2024     Lab Results   Component Value Date    ALT 10 04/14/2024    AST 19 04/14/2024    ALKPHOS 63 04/14/2024    BILITOT 0.7 06/26/2017       Lab Results   Component Value Date    WBC 13.17 (H) 04/14/2024    HGB 16.4 (H) 04/14/2024    HCT 48.2 (H) 04/14/2024    MCV 98 04/14/2024     04/14/2024       Lab Results   Component Value Date    TSH 0.14 (L) 02/16/2022       Lab Results   Component Value Date    CHOL 220 (H) 06/26/2017     Lab Results   Component Value Date    TRIG 131 02/16/2022     Lab Results   Component Value Date    HDL 70 02/16/2022     Lab Results   Component Value Date    LDLCALC 84 02/16/2022     No results found for: \"HGBA1C\"    Results for orders placed or performed during the hospital encounter of 04/14/24   CBC and differential   Result Value Ref Range    WBC 13.17 (H) 4.31 - 10.16 Thousand/uL    RBC 4.93 3.81 - 5.12 Million/uL    Hemoglobin 16.4 (H) 11.5 - 15.4 g/dL    Hematocrit 48.2 (H) 34.8 - 46.1 %    MCV 98 82 - 98 fL    MCH 33.3 26.8 - 34.3 pg    MCHC 34.0 31.4 - 37.4 g/dL    RDW 14.6 11.6 - 15.1 %    MPV 9.9 8.9 - 12.7 fL    Platelets 272 149 - 390 Thousands/uL    nRBC 0 /100 WBCs    Segmented % 77 (H) 43 - 75 %    Immature Grans % 0 0 - 2 %    Lymphocytes % 14 14 - 44 %    Monocytes % 8 4 - 12 %    Eosinophils Relative 0 0 - 6 %    Basophils Relative 1 0 - 1 %    Absolute Neutrophils 10.10 (H) 1.85 - 7.62 Thousands/µL    Absolute Immature Grans 0.04 0.00 - 0.20 Thousand/uL    Absolute Lymphocytes 1.85 0.60 - 4.47 Thousands/µL    Absolute Monocytes 1.11 0.17 - 1.22 Thousand/µL    Eosinophils Absolute 0.01 0.00 - 0.61 Thousand/µL    Basophils Absolute 0.06 0.00 - 0.10 Thousands/µL   Comprehensive metabolic panel " "  Result Value Ref Range    Sodium 135 135 - 147 mmol/L    Potassium 3.6 3.5 - 5.3 mmol/L    Chloride 95 (L) 96 - 108 mmol/L    CO2 26 21 - 32 mmol/L    ANION GAP 14 (H) 4 - 13 mmol/L    BUN 9 5 - 25 mg/dL    Creatinine 0.64 0.60 - 1.30 mg/dL    Glucose 134 65 - 140 mg/dL    Calcium 9.7 8.4 - 10.2 mg/dL    AST 19 13 - 39 U/L    ALT 10 7 - 52 U/L    Alkaline Phosphatase 63 34 - 104 U/L    Total Protein 7.2 6.4 - 8.4 g/dL    Albumin 4.3 3.5 - 5.0 g/dL    Total Bilirubin 1.75 (H) 0.20 - 1.00 mg/dL    eGFR 86 ml/min/1.73sq m   HS Troponin 0hr (reflex protocol)   Result Value Ref Range    hs TnI 0hr 3 \"Refer to ACS Flowchart\"- see link ng/L   Lipase   Result Value Ref Range    Lipase 18 11 - 82 u/L   Vitamin B12   Result Value Ref Range    Vitamin B-12 1,118 (H) 180 - 914 pg/mL   TSH   Result Value Ref Range    TSH 3RD GENERATON 37.313 (H) 0.450 - 4.500 uIU/mL   Folate   Result Value Ref Range    Folate 7.4 >5.9 ng/mL   T4, free   Result Value Ref Range    Free T4 0.80 0.61 - 1.12 ng/dL   ECG 12 lead   Result Value Ref Range    Ventricular Rate 74 BPM    Atrial Rate 74 BPM    KY Interval 134 ms    QRSD Interval 90 ms    QT Interval 372 ms    QTC Interval 412 ms    P Axis 82 degrees    QRS Axis 65 degrees    T Wave Axis 61 degrees       Orders Placed This Encounter   Procedures    TSH, 3rd generation    Holter monitor       ALLERGIES:  No Known Allergies    Current Medications     Current Outpatient Medications   Medication Sig Dispense Refill    acetaminophen (TYLENOL) 325 mg tablet Take 2 tablets (650 mg total) by mouth every 6 (six) hours as needed for mild pain  0    ALPRAZolam (XANAX) 1 mg tablet Take 1 tablet (1 mg total) by mouth 2 (two) times a day as needed for anxiety 60 tablet 3    amLODIPine (NORVASC) 5 mg tablet Take 1 tablet (5 mg total) by mouth daily 90 tablet 3    citalopram (CeleXA) 10 mg tablet take 1 tablet by mouth once daily 90 tablet 0    levothyroxine (Euthyrox) 75 mcg tablet Take 1 tablet (75 mcg " total) by mouth daily in the early morning 90 tablet 1     No current facility-administered medications for this visit.         Health Maintenance     Health Maintenance   Topic Date Due    Hepatitis C Screening  Never done    SLP PLAN OF CARE  Never done    Hepatitis A Vaccine (1 of 2 - Risk 2-dose series) Never done    Lung Cancer Screening  Never done    Falls: Plan of Care  Never done    Zoster Vaccine (2 of 2) 02/29/2016    Breast Cancer Screening: Mammogram  10/29/2021    PT PLAN OF CARE  08/24/2023    COVID-19 Vaccine (4 - 2023-24 season) 09/01/2023    Depression Screening  10/12/2024    Fall Risk  10/12/2024    Urinary Incontinence Screening  10/12/2024    Medicare Annual Wellness Visit (AWV)  10/12/2024    Osteoporosis Screening  Completed    Pneumococcal Vaccine: 65+ Years  Completed    HIB Vaccine  Aged Out    IPV Vaccine  Aged Out    Meningococcal ACWY Vaccine  Aged Out    HPV Vaccine  Aged Out    Colorectal Cancer Screening  Discontinued    Influenza Vaccine  Discontinued     Immunization History   Administered Date(s) Administered    COVID-19 PFIZER VACCINE 0.3 ML IM 05/17/2021, 06/15/2021    COVID-19 Pfizer vac (Ozzy-sucrose, gray cap) 12 yr+ IM 02/12/2022    Influenza, seasonal, injectable 09/22/2016    Pneumococcal Conjugate 13-Valent 05/17/2019    Pneumococcal Polysaccharide PPV23 06/12/2017    Tdap 02/12/2023    Zoster 01/04/2016    Zoster Vaccine Recombinant 01/04/2016       Jasson Salinas MD    I spent 30 minutes with this patient of which greater than 50% was spent counseling or reviewing chart

## 2024-04-30 NOTE — ASSESSMENT & PLAN NOTE
Syncope.  Etiology unknown at this time.  Patient was given prescription to obtain 48-hour Holter monitor for further evaluation of possible cardiac arrhythmia.

## 2024-04-30 NOTE — ASSESSMENT & PLAN NOTE
Alcohol abuse.  Patient states she has stopped drinking altogether since being evaluated in the hospital.

## 2024-05-14 ENCOUNTER — PROBLEM (OUTPATIENT)
Dept: URBAN - METROPOLITAN AREA CLINIC 6 | Facility: CLINIC | Age: 79
End: 2024-05-14

## 2024-05-14 DIAGNOSIS — Z96.1: ICD-10-CM

## 2024-05-14 DIAGNOSIS — H40.051: ICD-10-CM

## 2024-05-14 DIAGNOSIS — H40.1121: ICD-10-CM

## 2024-05-14 DIAGNOSIS — H04.123: ICD-10-CM

## 2024-05-14 DIAGNOSIS — H40.1113: ICD-10-CM

## 2024-05-14 PROCEDURE — 92020 GONIOSCOPY: CPT

## 2024-05-14 PROCEDURE — 92133 CPTRZD OPH DX IMG PST SGM ON: CPT

## 2024-05-14 PROCEDURE — 92202 OPSCPY EXTND ON/MAC DRAW: CPT

## 2024-05-14 PROCEDURE — 92014 COMPRE OPH EXAM EST PT 1/>: CPT

## 2024-05-14 ASSESSMENT — TONOMETRY
OS_IOP_MMHG: 23
OD_IOP_MMHG: 40
OD_IOP_MMHG: 45
OS_IOP_MMHG: 28

## 2024-05-14 ASSESSMENT — VISUAL ACUITY
OS_SC: 20/50+2
OS_PH: 20/40+2

## 2024-06-12 ENCOUNTER — HOSPITAL ENCOUNTER (EMERGENCY)
Facility: HOSPITAL | Age: 79
Discharge: HOME | End: 2024-06-12
Attending: EMERGENCY MEDICINE
Payer: COMMERCIAL

## 2024-06-12 ENCOUNTER — APPOINTMENT (EMERGENCY)
Dept: RADIOLOGY | Facility: HOSPITAL | Age: 79
End: 2024-06-12
Payer: COMMERCIAL

## 2024-06-12 VITALS
RESPIRATION RATE: 18 BRPM | HEART RATE: 88 BPM | DIASTOLIC BLOOD PRESSURE: 55 MMHG | OXYGEN SATURATION: 96 % | SYSTOLIC BLOOD PRESSURE: 108 MMHG | TEMPERATURE: 97.3 F

## 2024-06-12 DIAGNOSIS — S93.602A FOOT SPRAIN, LEFT, INITIAL ENCOUNTER: Primary | ICD-10-CM

## 2024-06-12 PROCEDURE — 73610 X-RAY EXAM OF ANKLE: CPT

## 2024-06-12 PROCEDURE — 73630 X-RAY EXAM OF FOOT: CPT

## 2024-06-12 PROCEDURE — 99284 EMERGENCY DEPT VISIT MOD MDM: CPT

## 2024-06-12 PROCEDURE — 99284 EMERGENCY DEPT VISIT MOD MDM: CPT | Performed by: EMERGENCY MEDICINE

## 2024-06-12 RX ORDER — ACETAMINOPHEN 325 MG/1
975 TABLET ORAL ONCE
Status: COMPLETED | OUTPATIENT
Start: 2024-06-12 | End: 2024-06-12

## 2024-06-12 RX ADMIN — ACETAMINOPHEN 975 MG: 325 TABLET, FILM COATED ORAL at 10:21

## 2024-06-12 NOTE — ED PROVIDER NOTES
History  Chief Complaint   Patient presents with    Fall     Down a flight of carpeted stairs. Denies thinners/ASA. Unsure why she fell. Pt c/o left foot pain. Unsure if hit head. Denies LOC. Fall happened a few days ago     This is a 79-year-old female who is coming in today with several days of left foot pain following a mechanical fall up stairs at home.  Patient states that on Monday evening she was ascending stairs whenever her left foot slipped near the bottom of the staircase causing it to invert under the left leg.  She has since had severe pain in her left midfoot and the dorsum of her left foot.  She states that it was very painful anytime she has attempted to bear weight.  She has not been bearing weight for the past 24 hours and has been using a walker to offload.  She is denying any other injuries from the fall, she denies any head strike, has not any aspirin or other antiplatelet agents or on any anticoagulant.  Patient is accompanied by her  who agrees that she is in her completely normal state of health otherwise.  Patient does live in a single-family home which has 4 steps to gain entry into the home.  She states she has been able to get up and down the stairs over the last 2 days though it does require her to sit and scoot down the stairs with 's help to get the walker from point a to point B.        Prior to Admission Medications   Prescriptions Last Dose Informant Patient Reported? Taking?   ALPRAZolam (XANAX) 1 mg tablet   No No   Sig: Take 1 tablet (1 mg total) by mouth 2 (two) times a day as needed for anxiety   acetaminophen (TYLENOL) 325 mg tablet  Self No No   Sig: Take 2 tablets (650 mg total) by mouth every 6 (six) hours as needed for mild pain   amLODIPine (NORVASC) 5 mg tablet  Self No No   Sig: Take 1 tablet (5 mg total) by mouth daily   citalopram (CeleXA) 10 mg tablet   No No   Sig: take 1 tablet by mouth once daily   levothyroxine (Euthyrox) 75 mcg tablet   No No    Sig: Take 1 tablet (75 mcg total) by mouth daily in the early morning      Facility-Administered Medications: None       Past Medical History:   Diagnosis Date    Anxiety     Last Assessed: 6/12/2017     COPD (chronic obstructive pulmonary disease) (HCC)     Depression     FH: colonic polyps     Glaucoma     Hypertension     Last Assessed: 12/12/2017    Hypothyroidism     Last Assessed: 6/12/2017    Menopause     Normal vaginal delivery     Osteopenia     Pap smear abnormality of cervix with ASCUS favoring benign     Telangiectasis        Past Surgical History:   Procedure Laterality Date    CATARACT EXTRACTION      COLONOSCOPY      complete    DILATION AND CURETTAGE OF UTERUS  01/10/1978    HEMORROIDECTOMY      x2    LAPAROSCOPY      Diagnostic     TUBAL LIGATION         Family History   Problem Relation Age of Onset    Heart disease Mother         Cardiac Disorder     Colonic polyp Mother     Hypertension Mother     Heart disease Father         Cardiac Dsiorder     Hypertension Father     Lung cancer Father     Breast cancer Sister 68    Stomach cancer Sister     Colonic polyp Brother     Lung cancer Brother     Hepatitis Daughter         Alcoholic    Arthritis Family     Lung cancer Family     No Known Problems Maternal Grandmother     No Known Problems Maternal Grandfather     No Known Problems Paternal Grandmother     No Known Problems Paternal Grandfather     No Known Problems Son     Esophageal cancer Sister 56     I have reviewed and agree with the history as documented.    E-Cigarette/Vaping    E-Cigarette Use Never User      E-Cigarette/Vaping Substances    Nicotine No     THC No     CBD No     Flavoring No     Other No     Unknown No      Social History     Tobacco Use    Smoking status: Every Day     Current packs/day: 1.00     Average packs/day: 1 pack/day for 35.0 years (35.0 ttl pk-yrs)     Types: Cigarettes    Smokeless tobacco: Never   Vaping Use    Vaping status: Never Used   Substance Use Topics     Alcohol use: Yes     Alcohol/week: 21.0 standard drinks of alcohol     Types: 21 Glasses of wine per week     Comment: no more than 3 glasses of wine per day    Drug use: No        Review of Systems   Constitutional:  Negative for chills and fever.   HENT:  Negative for ear pain and sore throat.    Eyes:  Negative for pain and visual disturbance.   Respiratory:  Negative for cough and shortness of breath.    Cardiovascular:  Negative for chest pain and palpitations.   Gastrointestinal:  Negative for abdominal pain and vomiting.   Genitourinary:  Negative for dysuria and hematuria.   Musculoskeletal:  Positive for arthralgias, gait problem and joint swelling. Negative for back pain.   Skin:  Negative for color change and rash.   Neurological:  Negative for seizures and syncope.   All other systems reviewed and are negative.      Physical Exam  ED Triage Vitals   Temperature Pulse Respirations Blood Pressure SpO2   06/12/24 0931 06/12/24 0931 06/12/24 0935 06/12/24 0931 06/12/24 0931   (!) 97.3 °F (36.3 °C) 88 18 108/55 96 %      Temp Source Heart Rate Source Patient Position - Orthostatic VS BP Location FiO2 (%)   06/12/24 0931 -- -- -- --   Temporal          Pain Score       06/12/24 0931       6             Orthostatic Vital Signs  Vitals:    06/12/24 0931   BP: 108/55   Pulse: 88       Physical Exam  Vitals and nursing note reviewed.   Constitutional:       General: She is not in acute distress.     Appearance: She is well-developed.   HENT:      Head: Normocephalic and atraumatic.      Right Ear: External ear normal.      Left Ear: External ear normal.      Nose: Nose normal. No congestion or rhinorrhea.      Mouth/Throat:      Mouth: Mucous membranes are moist.      Pharynx: Oropharynx is clear. No oropharyngeal exudate or posterior oropharyngeal erythema.   Eyes:      General: No scleral icterus.     Extraocular Movements: Extraocular movements intact.      Conjunctiva/sclera: Conjunctivae normal.       Pupils: Pupils are equal, round, and reactive to light.   Cardiovascular:      Rate and Rhythm: Normal rate and regular rhythm.      Pulses: Normal pulses.      Heart sounds: Normal heart sounds. No murmur heard.  Pulmonary:      Effort: Pulmonary effort is normal. No respiratory distress.      Breath sounds: Normal breath sounds. No wheezing or rhonchi.   Abdominal:      General: Abdomen is flat. There is no distension.      Palpations: Abdomen is soft.      Tenderness: There is no abdominal tenderness. There is no guarding.   Musculoskeletal:         General: No swelling.      Cervical back: Neck supple. No rigidity.      Right lower leg: No edema.      Left lower leg: No edema.      Left foot: Decreased range of motion. Swelling, tenderness and bony tenderness present.   Lymphadenopathy:      Cervical: No cervical adenopathy.   Skin:     General: Skin is warm and dry.      Capillary Refill: Capillary refill takes less than 2 seconds.      Coloration: Skin is not jaundiced.      Findings: No rash.   Neurological:      General: No focal deficit present.      Mental Status: She is alert and oriented to person, place, and time. Mental status is at baseline.   Psychiatric:         Mood and Affect: Mood normal.         Behavior: Behavior normal.         ED Medications  Medications   acetaminophen (TYLENOL) tablet 975 mg (975 mg Oral Given 6/12/24 1021)       Diagnostic Studies  Results Reviewed       None                   XR foot 3+ views LEFT   Final Result by Caleb Zuniga MD (06/12 1048)      No acute osseous abnormality.      Workstation performed: [id number]         XR ankle 3+ views LEFT   Final Result by Caleb Zuniga MD (06/12 1048)      No acute osseous abnormality.      Workstation performed: [id number]               Procedures  Procedures      ED Course                                       Medical Decision Making  Medical complexity: This is a 79-year-old female with midfoot tenderness  after mechanical fall up stairs, no other injuries.  Trauma evaluation to include foot x-ray and ankle x-ray of the left ankle.  Patient will require some type of splint or boot just based on initial evaluation.    Reassessment/disposition: Patient has no fracture evident on x-rays however has significant bruising and swelling of the foot.  Suspect sprain of the foot or ligamentous injury.  I discussed my suspicion with the patient and placed her in a walking boot.  Patient assures me that she felt safe returning home with a walking boot in order to be weightbearing as tolerated.  She believes that she will be able to get into and out of the house without difficulty which was evidenced by the fact that patient was able to make it here to the emergency department today.  Patient was discharged with podiatry follow-up and cam walking boot.  She has a walker at home for ambulation.    Amount and/or Complexity of Data Reviewed  Radiology: ordered.    Risk  OTC drugs.          Disposition  Final diagnoses:   Foot sprain, left, initial encounter     Time reflects when diagnosis was documented in both MDM as applicable and the Disposition within this note       Time User Action Codes Description Comment    6/12/2024 11:00 AM Yifan Wu Add [S93.602A] Foot sprain, left, initial encounter           ED Disposition       ED Disposition   Discharge    Condition   Stable    Date/Time   Wed Jun 12, 2024 11:01 AM    Comment   Karli Maradiaga discharge to home/self care.                   Follow-up Information       Follow up With Specialties Details Why Contact Info Additional Information    Jasson Salinas MD Family Medicine   [address] [place] [id number] [phone]       [facility] Emergency Department Emergency Medicine Go to  If symptoms worsen, As needed [address] [facility] [id number] [phone] [facility] Emergency Department, [address] [facility]  [place], [id number] [phone]    [facility] Internal Medicine Schedule an appointment as soon as possible for a visit in 3 days Call for appointment with podiatry [address]  [facility] [address]  [facility] [address] [phone] [facility], [address] [facility] [address], [place], [place], [address] [phone]            Discharge Medication List as of 6/12/2024 11:02 AM        CONTINUE these medications which have NOT CHANGED    Details   acetaminophen (TYLENOL) 325 mg tablet Take 2 tablets (650 mg total) by mouth every 6 (six) hours as needed for mild pain, Starting Tue 2/14/2023, No Print      ALPRAZolam (XANAX) 1 mg tablet Take 1 tablet (1 mg total) by mouth 2 (two) times a day as needed for anxiety, Starting Fri 12/1/2023, Normal      amLODIPine (NORVASC) 5 mg tablet Take 1 tablet (5 mg total) by mouth daily, Starting Thu 4/20/2023, Normal      citalopram (CeleXA) 10 mg tablet take 1 tablet by mouth once daily, Starting Mon 4/15/2024, Normal      levothyroxine (Euthyrox) 75 mcg tablet Take 1 tablet (75 mcg total) by mouth daily in the early morning, Starting Tue 4/30/2024, Normal               PDMP Review         Value Time User    PDMP Reviewed  Yes 4/20/2023  5:19 PM OLI Mena             ED Provider  Attending physically available and evaluated Karli Maradiaga. I managed the patient along with the ED Attending.    Electronically Signed by           Yifan Wu MD  06/12/24 9899

## 2024-06-12 NOTE — ED ATTENDING ATTESTATION
"I, Jim Wadsworth MD, saw and evaluated the patient. I have discussed the patient with the resident and agree with the resident's findings, Plan of Care, and MDM as documented in the resident's note, except where noted. All available labs and Radiology studies were reviewed.  I was present for key portions of any procedure(s) performed by the resident and I was immediately available to provide assistance.    At this point I agree with the current assessment done in the Emergency Department.  I have conducted an independent evaluation of this patient a history and physical is as follows:    80 yo female with a history of HTN, anxiety, hypothyroidism, major depressive disorder, COPD, osteopenia, and alcohol abuse presents to the ED complaining of left foot pain s/p a mechanical fall several days ago. The patient says she \"slipped\" while walking up the stairs and fell backwards awkwardly onto the foot, twisting it. No head strike or LOC. She is now complaining of some pain, swelling, and bruising to the foot. She is having some difficulty bearing weight due to the discomfort and is using a walker at home. No numbness or weakness in the extremity. No other specific injuries or complaints.    ROS: per resident physician note    Gen: NAD, AA&Ox3  HEENT: PERRL, EOMI  Neck: supple  CV: RRR  Lungs: CTA B/L  Abdomen: soft, NT/ND  LLE: no deformity, (+) tenderness/swelling/ecchymosis to left midfoot, sensation and pulses intact, decrease ROM secondary to pain  Neuro: 5/5 strength all extremities, sensation grossly intact  Skin: no rash    ED Course  The patient is well appearing with stable vital signs. (+) Tenderness, swelling, and bruising to left midfoot. Sprain vs fracture vs Lisfranc injury? She has no other injuries and fall seems mechanical. Will check x-rays of the foot and ankle. APAP administered. Will continue to monitor in the ED. Disposition and further treatment per workup and reassessment.      Critical Care " Time  Procedures

## 2024-06-12 NOTE — DISCHARGE INSTRUCTIONS
Continue to wear your boot, you can bear weight as tolerated, use Tylenol as necessary for pain.  It would help to ice the foot when not using.  Keep it wrapped with compressive dressing or compression sock, and then elevate the foot.

## 2024-06-26 ENCOUNTER — TELEPHONE (OUTPATIENT)
Dept: FAMILY MEDICINE CLINIC | Facility: CLINIC | Age: 79
End: 2024-06-26

## 2024-06-26 ENCOUNTER — HOSPITAL ENCOUNTER (OUTPATIENT)
Dept: NON INVASIVE DIAGNOSTICS | Facility: CLINIC | Age: 79
Discharge: HOME | End: 2024-06-26
Payer: COMMERCIAL

## 2024-06-26 DIAGNOSIS — F41.9 ANXIETY: ICD-10-CM

## 2024-06-26 DIAGNOSIS — R55 SYNCOPE, UNSPECIFIED SYNCOPE TYPE: ICD-10-CM

## 2024-06-26 PROCEDURE — 93225 XTRNL ECG REC<48 HRS REC: CPT

## 2024-06-26 PROCEDURE — 93226 XTRNL ECG REC<48 HR SCAN A/R: CPT

## 2024-06-27 RX ORDER — ALPRAZOLAM 1 MG/1
1 TABLET ORAL 2 TIMES DAILY PRN
Qty: 60 TABLET | Refills: 3 | Status: SHIPPED | OUTPATIENT
Start: 2024-06-27

## 2024-07-03 ENCOUNTER — TELEPHONE (OUTPATIENT)
Age: 79
End: 2024-07-03

## 2024-07-03 NOTE — TELEPHONE ENCOUNTER
Pt called to get results from her halter monitor. She is requesting a call back once those results are read.    Pt # [phone]    Thanks

## 2024-07-09 ENCOUNTER — IOP CHECK (OUTPATIENT)
Dept: URBAN - METROPOLITAN AREA CLINIC 6 | Facility: CLINIC | Age: 79
End: 2024-07-09

## 2024-07-09 DIAGNOSIS — H04.123: ICD-10-CM

## 2024-07-09 DIAGNOSIS — H40.1121: ICD-10-CM

## 2024-07-09 DIAGNOSIS — H40.1113: ICD-10-CM

## 2024-07-09 DIAGNOSIS — H40.051: ICD-10-CM

## 2024-07-09 PROCEDURE — 92012 INTRM OPH EXAM EST PATIENT: CPT

## 2024-07-09 PROCEDURE — 92083 EXTENDED VISUAL FIELD XM: CPT

## 2024-07-09 ASSESSMENT — VISUAL ACUITY
OS_PH: 20/25-2
OS_SC: 20/40+1
OU_SC: J5

## 2024-07-09 ASSESSMENT — TONOMETRY
OD_IOP_MMHG: 5
OS_IOP_MMHG: 22

## 2024-07-12 DIAGNOSIS — I10 HYPERTENSION, UNSPECIFIED TYPE: ICD-10-CM

## 2024-07-12 RX ORDER — AMLODIPINE BESYLATE 5 MG/1
5 TABLET ORAL DAILY
Qty: 100 TABLET | Refills: 1 | Status: SHIPPED | OUTPATIENT
Start: 2024-07-12

## 2024-07-12 NOTE — TELEPHONE ENCOUNTER
Patient called refill line, she wants to know her results from last month for the Holter monitor.    Please call her back asap as she is anxious about the results and has not heard anything for weeks

## 2024-07-15 ENCOUNTER — TELEPHONE (OUTPATIENT)
Age: 79
End: 2024-07-15

## 2024-07-15 NOTE — TELEPHONE ENCOUNTER
Please be aware that the halter monitor results are in patients chart, however they show as unsigned, spoke with cardio, they recommended provider sign, and review and the ordering provider would need to be the one to sign report.   Made patient an appt for result review for 7/24 at 9:40 a.m., if appointment is not necessary please call patient and cancel.

## 2024-07-16 NOTE — TELEPHONE ENCOUNTER
"Called and spoke with patient in regards of her message to provider and rely provider's response. Patient states \"Im still feeling dizzy on and off and when lifting Rt arm I fall down\". Patient request referral,  Please place the Referral for Cardiologist. I will call patient to schedule appointment. Please review, Thank you  "

## 2024-07-16 NOTE — TELEPHONE ENCOUNTER
Appointment with patient is not necessary.  Holter monitor did not show significant findings to explain her fall however cardiologist stated test was not of good quality since her leads on her chest had come on and off during the testing.  If patient continues to experience any sensations of lightheadedness, dizziness, heart palpitations or any other episodes of falling I would recommend consultation with cardiologist appointment.

## 2024-07-17 DIAGNOSIS — F41.9 ANXIETY: ICD-10-CM

## 2024-07-17 RX ORDER — CITALOPRAM HYDROBROMIDE 10 MG/1
10 TABLET ORAL DAILY
Qty: 100 TABLET | Refills: 1 | Status: SHIPPED | OUTPATIENT
Start: 2024-07-17

## 2024-07-24 ENCOUNTER — OFFICE VISIT (OUTPATIENT)
Dept: FAMILY MEDICINE CLINIC | Facility: CLINIC | Age: 79
End: 2024-07-24
Payer: COMMERCIAL

## 2024-07-24 VITALS
OXYGEN SATURATION: 97 % | RESPIRATION RATE: 18 BRPM | DIASTOLIC BLOOD PRESSURE: 62 MMHG | WEIGHT: 85.5 LBS | BODY MASS INDEX: 15.73 KG/M2 | SYSTOLIC BLOOD PRESSURE: 110 MMHG | HEIGHT: 62 IN | HEART RATE: 94 BPM | TEMPERATURE: 97.6 F

## 2024-07-24 DIAGNOSIS — I10 HYPERTENSION, UNSPECIFIED TYPE: Primary | ICD-10-CM

## 2024-07-24 DIAGNOSIS — R55 SYNCOPE, UNSPECIFIED SYNCOPE TYPE: ICD-10-CM

## 2024-07-24 DIAGNOSIS — E03.9 HYPOTHYROIDISM, UNSPECIFIED TYPE: ICD-10-CM

## 2024-07-24 DIAGNOSIS — H61.22 IMPACTED CERUMEN OF LEFT EAR: ICD-10-CM

## 2024-07-24 PROCEDURE — 69210 REMOVE IMPACTED EAR WAX UNI: CPT | Performed by: FAMILY MEDICINE

## 2024-07-24 PROCEDURE — 99214 OFFICE O/P EST MOD 30 MIN: CPT | Performed by: FAMILY MEDICINE

## 2024-07-24 RX ORDER — LATANOPROST 50 UG/ML
SOLUTION/ DROPS OPHTHALMIC
COMMUNITY
Start: 2024-06-06

## 2024-07-24 RX ORDER — AMLODIPINE BESYLATE 5 MG/1
5 TABLET ORAL DAILY
Qty: 100 TABLET | Refills: 1 | Status: SHIPPED | OUTPATIENT
Start: 2024-07-24

## 2024-07-24 NOTE — ASSESSMENT & PLAN NOTE
Syncope.  Patient will obtain blood work as ordered for further evaluation.  She again was given referral to [facility] cardiology for further evaluation and treatment.

## 2024-07-24 NOTE — ASSESSMENT & PLAN NOTE
Hypothyroidism.  Patient will check TSH blood work.  She may continue with current dose of levothyroxine.  We will make further recommendations pending results of test.

## 2024-07-24 NOTE — PROGRESS NOTES
FAMILY PRACTICE OFFICE VISIT       NAME: Karli Maradiaga  AGE: 79 y.o. SEX: female       : 1945        MRN: [id number]    DATE: 2024  TIME: 12:38 PM    Assessment and Plan     Problem List Items Addressed This Visit       Hypertension - Primary     Hypertension.  The patient's blood pressure is stable at this time and he will continue current regimen of medications         Relevant Medications    amLODIPine (NORVASC) 5 mg tablet    Other Relevant Orders    TSH, 3rd generation    Comprehensive metabolic panel    CBC    Ambulatory Referral to Cardiology    Hypothyroidism     Hypothyroidism.  Patient will check TSH blood work.  She may continue with current dose of levothyroxine.  We will make further recommendations pending results of test.         Syncope     Syncope.  Patient will obtain blood work as ordered for further evaluation.  She again was given referral to [facility]'s cardiology for further evaluation and treatment.         Relevant Orders    Ambulatory Referral to Cardiology    Impacted cerumen of left ear     Cerumen impaction.  Using irrigation with peroxide and water as well as using a curette I was able to remove large amount of wax from left ear canal.  Tympanic membrane within normal limits         Relevant Orders    Ear cerumen removal           Chief Complaint     Chief Complaint   Patient presents with    Follow-up     Holter monitor        History of Present Illness     Patient in the office to review chronic condition.  Unfortunately her recent Holter monitor was not reliable due to the leads coming on and off.  She continues to have episodic syncopal episodes with no warning.  Patient denies any prior symptoms of chest pain, shortness of breath, dizziness, lightheadedness.  Patient gets sensation of feeling weak and will fall to the ground.  She is then able to hold onto her feet and stand up slowly.  Patient states she has been trying to eat more calories throughout her day however  by giving up her wine consumption she is lost 5 pounds in the last several weeks to months.  She denies any abdominal pain or changes in bowel movements    Patient does describe decreased hearing bilaterally but especially on the left ear.        Review of Systems   Review of Systems   Constitutional:  Positive for unexpected weight change.   HENT:  Positive for hearing loss.    Respiratory: Negative.     Cardiovascular: Negative.    Gastrointestinal: Negative.    Neurological:         As per HPI   Psychiatric/Behavioral:  The patient is nervous/anxious.        Active Problem List     Patient Active Problem List   Diagnosis    Hypertension    Anxiety    Hypothyroidism    Depression, recurrent ([facility])    Diarrhea    Hemorrhoids    SDH (subdural hematoma) ([facility])    Anxiety    Alcohol abuse    Neurologic gait dysfunction    Traumatic subdural hemorrhage with loss of consciousness of unspecified duration, initial encounter ([facility])    Syncope    Impacted cerumen of left ear       Past Medical History:  Past Medical History:   Diagnosis Date    Anxiety     Last Assessed: 6/12/2017     COPD (chronic obstructive pulmonary disease) ([facility])     Depression     FH: colonic polyps     Glaucoma     Hypertension     Last Assessed: 12/12/2017    Hypothyroidism     Last Assessed: 6/12/2017    Menopause     Normal vaginal delivery     Osteopenia     Pap smear abnormality of cervix with ASCUS favoring benign     Telangiectasis        Past Surgical History:  Past Surgical History:   Procedure Laterality Date    CATARACT EXTRACTION      COLONOSCOPY      complete    DILATION AND CURETTAGE OF UTERUS  01/10/1978    HEMORROIDECTOMY      x2    LAPAROSCOPY      Diagnostic     TUBAL LIGATION         Family History:  Family History   Problem Relation Age of Onset    Heart disease Mother         Cardiac Disorder     Colonic polyp Mother     Hypertension Mother     Heart disease Father         Cardiac Dsiorder     Hypertension Father     Lung cancer  Father     Breast cancer Sister 68    Stomach cancer Sister     Colonic polyp Brother     Lung cancer Brother     Hepatitis Daughter         Alcoholic    Arthritis Family     Lung cancer Family     No Known Problems Maternal Grandmother     No Known Problems Maternal Grandfather     No Known Problems Paternal Grandmother     No Known Problems Paternal Grandfather     No Known Problems Son     Esophageal cancer Sister 56       Social History:  Social History     Socioeconomic History    Marital status: /Civil Union     Spouse name: Not on file    Number of children: Not on file    Years of education: Not on file    Highest education level: Not on file   Occupational History    Not on file   Tobacco Use    Smoking status: Every Day     Current packs/day: 1.00     Average packs/day: 1 pack/day for 35.0 years (35.0 ttl pk-yrs)     Types: Cigarettes    Smokeless tobacco: Never   Vaping Use    Vaping status: Never Used   Substance and Sexual Activity    Alcohol use: Yes     Alcohol/week: 21.0 standard drinks of alcohol     Types: 21 Glasses of wine per week     Comment: no more than 3 glasses of wine per day    Drug use: No    Sexual activity: Yes   Other Topics Concern    Not on file   Social History Narrative    ** Merged History Encounter **         Drinks Coffee - 1-2 cup a day   Exercises Daily      Social Determinants of Health     Financial Resource Strain: Low Risk  (10/12/2023)    Overall Financial Resource Strain (CARDIA)     Difficulty of Paying Living Expenses: Not very hard   Food Insecurity: No Food Insecurity (2/14/2023)    Hunger Vital Sign     Worried About Running Out of Food in the Last Year: Never true     Ran Out of Food in the Last Year: Never true   Transportation Needs: No Transportation Needs (10/12/2023)    PRAPARE - Transportation     Lack of Transportation (Medical): No     Lack of Transportation (Non-Medical): No   Physical Activity: Not on file   Stress: Not on file   Social  Connections: Not on file   Intimate Partner Violence: Not on file   Housing Stability: Low Risk  (2/14/2023)    Housing Stability Vital Sign     Unable to Pay for Housing in the Last Year: No     Number of Places Lived in the Last Year: 1     Unstable Housing in the Last Year: No       Objective     Vitals:    07/24/24 0937   BP: 110/62   Pulse: 94   Resp: 18   Temp: 97.6 °F (36.4 °C)   SpO2: 97%     Wt Readings from Last 3 Encounters:   07/24/24 38.8 kg (85 lb 8 oz)   04/30/24 43.2 kg (95 lb 4 oz)   04/17/24 40.8 kg (90 lb)       Physical Exam  Constitutional:       General: She is not in acute distress.     Appearance: Normal appearance. She is not ill-appearing.   HENT:      Head: Normocephalic and atraumatic.      Right Ear: Tympanic membrane, ear canal and external ear normal. There is no impacted cerumen.      Left Ear: Tympanic membrane, ear canal and external ear normal. There is impacted cerumen.   Eyes:      General:         Right eye: No discharge.         Left eye: No discharge.      Extraocular Movements: Extraocular movements intact.      Conjunctiva/sclera: Conjunctivae normal.      Pupils: Pupils are equal, round, and reactive to light.   Neck:      Vascular: No carotid bruit.   Cardiovascular:      Rate and Rhythm: Normal rate and regular rhythm.      Heart sounds: Normal heart sounds. No murmur heard.  Pulmonary:      Effort: Pulmonary effort is normal.      Breath sounds: Normal breath sounds. No wheezing, rhonchi or rales.   Musculoskeletal:      Right lower leg: No edema.      Left lower leg: No edema.   Lymphadenopathy:      Cervical: No cervical adenopathy.   Skin:     Findings: No rash.   Neurological:      General: No focal deficit present.      Mental Status: She is alert and oriented to person, place, and time. Mental status is at baseline.      Cranial Nerves: No cranial nerve deficit.   Psychiatric:         Mood and Affect: Mood normal.         Behavior: Behavior normal.         Thought  "Content: Thought content normal.         Judgment: Judgment normal.       Ear cerumen removal    Date/Time: 7/24/2024 9:40 AM    Performed by: Jasson Salinas MD  Authorized by: Jasson Salinas MD  Universal Protocol:  Consent: Verbal consent obtained.  Consent given by: patient    Patient location:  Clinic  Procedure details:     Local anesthetic:  None    Location:  L ear    Procedure type: irrigation with instrumentation      Instrumentation: curette      Approach:  Natural orifice  Post-procedure details:     Complication:  None    Hearing quality:  Improved    Patient tolerance of procedure:  Tolerated well, no immediate complications      Pertinent Laboratory/Diagnostic Studies:  Lab Results   Component Value Date    GLUCOSE 115 02/12/2023    BUN 9 04/14/2024    CREATININE 0.64 04/14/2024    CALCIUM 9.7 04/14/2024     06/26/2017    K 3.6 04/14/2024    CO2 26 04/14/2024    CL 95 (L) 04/14/2024     Lab Results   Component Value Date    ALT 10 04/14/2024    AST 19 04/14/2024    ALKPHOS 63 04/14/2024    BILITOT 0.7 06/26/2017       Lab Results   Component Value Date    WBC 13.17 (H) 04/14/2024    HGB 16.4 (H) 04/14/2024    HCT 48.2 (H) 04/14/2024    MCV 98 04/14/2024     04/14/2024       Lab Results   Component Value Date    TSH 0.14 (L) 02/16/2022       Lab Results   Component Value Date    CHOL 220 (H) 06/26/2017     Lab Results   Component Value Date    TRIG 131 02/16/2022     Lab Results   Component Value Date    HDL 70 02/16/2022     Lab Results   Component Value Date    LDLCALC 84 02/16/2022     No results found for: \"HGBA1C\"    Results for orders placed or performed during the hospital encounter of 04/14/24   CBC and differential   Result Value Ref Range    WBC 13.17 (H) 4.31 - 10.16 Thousand/uL    RBC 4.93 3.81 - 5.12 Million/uL    Hemoglobin 16.4 (H) 11.5 - 15.4 g/dL    Hematocrit 48.2 (H) 34.8 - 46.1 %    MCV 98 82 - 98 fL    MCH 33.3 26.8 - 34.3 pg    MCHC 34.0 31.4 - 37.4 g/dL    RDW 14.6 11.6 " "- 15.1 %    MPV 9.9 8.9 - 12.7 fL    Platelets 272 149 - 390 Thousands/uL    nRBC 0 /100 WBCs    Segmented % 77 (H) 43 - 75 %    Immature Grans % 0 0 - 2 %    Lymphocytes % 14 14 - 44 %    Monocytes % 8 4 - 12 %    Eosinophils Relative 0 0 - 6 %    Basophils Relative 1 0 - 1 %    Absolute Neutrophils 10.10 (H) 1.85 - 7.62 Thousands/µL    Absolute Immature Grans 0.04 0.00 - 0.20 Thousand/uL    Absolute Lymphocytes 1.85 0.60 - 4.47 Thousands/µL    Absolute Monocytes 1.11 0.17 - 1.22 Thousand/µL    Eosinophils Absolute 0.01 0.00 - 0.61 Thousand/µL    Basophils Absolute 0.06 0.00 - 0.10 Thousands/µL   Comprehensive metabolic panel   Result Value Ref Range    Sodium 135 135 - 147 mmol/L    Potassium 3.6 3.5 - 5.3 mmol/L    Chloride 95 (L) 96 - 108 mmol/L    CO2 26 21 - 32 mmol/L    ANION GAP 14 (H) 4 - 13 mmol/L    BUN 9 5 - 25 mg/dL    Creatinine 0.64 0.60 - 1.30 mg/dL    Glucose 134 65 - 140 mg/dL    Calcium 9.7 8.4 - 10.2 mg/dL    AST 19 13 - 39 U/L    ALT 10 7 - 52 U/L    Alkaline Phosphatase 63 34 - 104 U/L    Total Protein 7.2 6.4 - 8.4 g/dL    Albumin 4.3 3.5 - 5.0 g/dL    Total Bilirubin 1.75 (H) 0.20 - 1.00 mg/dL    eGFR 86 ml/min/1.73sq m   HS Troponin 0hr (reflex protocol)   Result Value Ref Range    hs TnI 0hr 3 \"Refer to ACS Flowchart\"- see link ng/L   Lipase   Result Value Ref Range    Lipase 18 11 - 82 u/L   Vitamin B12   Result Value Ref Range    Vitamin B-12 1,118 (H) 180 - 914 pg/mL   TSH   Result Value Ref Range    TSH 3RD GENERATON 37.313 (H) 0.450 - 4.500 uIU/mL   Folate   Result Value Ref Range    Folate 7.4 >5.9 ng/mL   T4, free   Result Value Ref Range    Free T4 0.80 0.61 - 1.12 ng/dL   ECG 12 lead   Result Value Ref Range    Ventricular Rate 74 BPM    Atrial Rate 74 BPM    ME Interval 134 ms    QRSD Interval 90 ms    QT Interval 372 ms    QTC Interval 412 ms    P Axis 82 degrees    QRS Axis 65 degrees    T Wave Axis 61 degrees       Orders Placed This Encounter   Procedures    Ear cerumen " removal    TSH, 3rd generation    Comprehensive metabolic panel    CBC    Ambulatory Referral to Cardiology       ALLERGIES:  No Known Allergies    Current Medications     Current Outpatient Medications   Medication Sig Dispense Refill    acetaminophen (TYLENOL) 325 mg tablet Take 2 tablets (650 mg total) by mouth every 6 (six) hours as needed for mild pain  0    ALPRAZolam (XANAX) 1 mg tablet Take 1 tablet (1 mg total) by mouth 2 (two) times a day as needed for anxiety 60 tablet 3    amLODIPine (NORVASC) 5 mg tablet Take 1 tablet (5 mg total) by mouth daily 100 tablet 1    citalopram (CeleXA) 10 mg tablet take 1 tablet by mouth once daily 100 tablet 1    latanoprost (XALATAN) 0.005 % ophthalmic solution instill 1 drop into both eyes every evening      levothyroxine (Euthyrox) 75 mcg tablet Take 1 tablet (75 mcg total) by mouth daily in the early morning 90 tablet 1     No current facility-administered medications for this visit.         Health Maintenance     Health Maintenance   Topic Date Due    Hepatitis C Screening  Never done    SLP PLAN OF CARE  Never done    Hepatitis A Vaccine (1 of 2 - Risk 2-dose series) Never done    Lung Cancer Screening  Never done    RSV Vaccine Age 60+ Years (1 - 1-dose 60+ series) Never done    Falls: Plan of Care  Never done    Zoster Vaccine (2 of 2) 02/29/2016    Breast Cancer Screening: Mammogram  10/29/2021    PT PLAN OF CARE  08/24/2023    COVID-19 Vaccine (4 - 2023-24 season) 09/01/2023    Medicare Annual Wellness Visit (AWV)  10/12/2024    Urinary Incontinence Screening  10/12/2024    Depression Screening  04/30/2025    Fall Risk  07/24/2025    Osteoporosis Screening  Completed    Pneumococcal Vaccine: 65+ Years  Completed    RSV Vaccine age 0-20 Months  Aged Out    HIB Vaccine  Aged Out    IPV Vaccine  Aged Out    Meningococcal ACWY Vaccine  Aged Out    HPV Vaccine  Aged Out    Colorectal Cancer Screening  Discontinued    Influenza Vaccine  Discontinued     Immunization  History   Administered Date(s) Administered    COVID-19 PFIZER VACCINE 0.3 ML IM 05/17/2021, 06/15/2021    COVID-19 Pfizer vac (Ozzy-sucrose, gray cap) 12 yr+ IM 02/12/2022    Influenza, seasonal, injectable 09/22/2016    Pneumococcal Conjugate 13-Valent 05/17/2019    Pneumococcal Polysaccharide PPV23 06/12/2017    Tdap 02/12/2023    Zoster 01/04/2016    Zoster Vaccine Recombinant 01/04/2016       Jasson Salinas MD    I spent 30 minutes with this patient of which greater than 50% was spent counseling or reviewing chart

## 2024-07-24 NOTE — ASSESSMENT & PLAN NOTE
Cerumen impaction.  Using irrigation with peroxide and water as well as using a curette I was able to remove large amount of wax from left ear canal.  Tympanic membrane within normal limits

## 2024-07-25 NOTE — TELEPHONE ENCOUNTER
Pt called stating another halter monitor was needed. She tried to make the appt but was told her PCP needed to make that appt at cardiologist on [address]. Please advise patient at [phone]    thanks

## 2024-07-25 NOTE — TELEPHONE ENCOUNTER
Call patient back ,  I explain that patient need to call and make this appointment  to see the cardiology ,  Patient refuse to call and make an appointment with cardiology

## 2024-07-26 DIAGNOSIS — E03.9 HYPOTHYROIDISM, UNSPECIFIED TYPE: Primary | ICD-10-CM

## 2024-07-26 LAB
ALBUMIN SERPL-MCNC: 4 G/DL (ref 3.6–5.1)
ALBUMIN/GLOB SERPL: 1.6 (CALC) (ref 1–2.5)
ALP SERPL-CCNC: 52 U/L (ref 37–153)
ALT SERPL-CCNC: 5 U/L (ref 6–29)
AST SERPL-CCNC: 12 U/L (ref 10–35)
BASOPHILS # BLD AUTO: 29 CELLS/UL (ref 0–200)
BASOPHILS NFR BLD AUTO: 0.4 %
BILIRUB SERPL-MCNC: 0.5 MG/DL (ref 0.2–1.2)
BUN SERPL-MCNC: 8 MG/DL (ref 7–25)
BUN/CREAT SERPL: 20 (CALC) (ref 6–22)
CALCIUM SERPL-MCNC: 9.2 MG/DL (ref 8.6–10.4)
CHLORIDE SERPL-SCNC: 104 MMOL/L (ref 98–110)
CO2 SERPL-SCNC: 30 MMOL/L (ref 20–32)
CREAT SERPL-MCNC: 0.4 MG/DL (ref 0.6–1)
EOSINOPHIL # BLD AUTO: 153 CELLS/UL (ref 15–500)
EOSINOPHIL NFR BLD AUTO: 2.1 %
ERYTHROCYTE [DISTWIDTH] IN BLOOD BY AUTOMATED COUNT: 12.3 % (ref 11–15)
GFR/BSA.PRED SERPLBLD CYS-BASED-ARV: 102 ML/MIN/1.73M2
GLOBULIN SER CALC-MCNC: 2.5 G/DL (CALC) (ref 1.9–3.7)
GLUCOSE SERPL-MCNC: 96 MG/DL (ref 65–139)
HCT VFR BLD AUTO: 34.4 % (ref 35–45)
HGB BLD-MCNC: 11.5 G/DL (ref 11.7–15.5)
LYMPHOCYTES # BLD AUTO: 2066 CELLS/UL (ref 850–3900)
LYMPHOCYTES NFR BLD AUTO: 28.3 %
MCH RBC QN AUTO: 31.8 PG (ref 27–33)
MCHC RBC AUTO-ENTMCNC: 33.4 G/DL (ref 32–36)
MCV RBC AUTO: 95 FL (ref 80–100)
MONOCYTES # BLD AUTO: 672 CELLS/UL (ref 200–950)
MONOCYTES NFR BLD AUTO: 9.2 %
NEUTROPHILS # BLD AUTO: 4380 CELLS/UL (ref 1500–7800)
NEUTROPHILS NFR BLD AUTO: 60 %
PLATELET # BLD AUTO: 270 THOUSAND/UL (ref 140–400)
PMV BLD REES-ECKER: 10.1 FL (ref 7.5–12.5)
POTASSIUM SERPL-SCNC: 3.9 MMOL/L (ref 3.5–5.3)
PROT SERPL-MCNC: 6.5 G/DL (ref 6.1–8.1)
RBC # BLD AUTO: 3.62 MILLION/UL (ref 3.8–5.1)
SODIUM SERPL-SCNC: 140 MMOL/L (ref 135–146)
TSH SERPL-ACNC: 0.08 MIU/L (ref 0.4–4.5)
WBC # BLD AUTO: 7.3 THOUSAND/UL (ref 3.8–10.8)

## 2024-07-26 RX ORDER — LEVOTHYROXINE SODIUM 0.05 MG/1
50 TABLET ORAL
Qty: 100 TABLET | Refills: 0 | Status: SHIPPED | OUTPATIENT
Start: 2024-07-26

## 2024-08-23 PROBLEM — H61.22 IMPACTED CERUMEN OF LEFT EAR: Status: RESOLVED | Noted: 2024-07-24 | Resolved: 2024-08-23

## 2024-08-28 NOTE — PROGRESS NOTES
"Advanced Heart Failure/Pulmonary Hypertension Outpatient Note - Karli Maradiaga 79 y.o. female MRN: [id number]    @ Encounter: [id number]      Assessment:  79 y.o. female PMH and acute problems listed later in this note (a partial list may also be included within 'assessment' section) presents for consultation.  I first met Karli Maradiaga on 08/30/24.  Referred by Jasson Salinas MD     Patient Active Problem List   Diagnosis    Hypertension    Anxiety    Hypothyroidism    Depression, recurrent (HCC)    Diarrhea    Hemorrhoids    SDH (subdural hematoma) ([facility])    Anxiety    Alcohol abuse    Neurologic gait dysfunction    Traumatic subdural hemorrhage with loss of consciousness of unspecified duration, initial encounter ([facility])    Syncope   CT 4/2024: 3. 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   Current smoker, cutting down  Etoh abuse, Used to drink 3-4 glasses wine daily, just cut down around 5/2024  Underweight  falls  6/2024 holter performed by another provider:  IMPRESSION:     Sinus rhythm throughout the study with an average HR of 73 bpm (), following a diurnal pattern.  low ectopy burden.  Symptom diary reviewed, no arrhythmias or ectopy associate with symptoms.        Today I have reviewed all pertinent labs/imaging/data including but not limited to:        Lab Units 07/25/24  1004 04/14/24  1201 02/14/23  0437   CREATININE mg/dL 0.40* 0.64 0.33*         Lab Results   Component Value Date    K 3.9 07/25/2024     No results found for: \"HGBA1C\"  Lab Results   Component Value Date    ZZS8HPAUYOBB 37.313 (H) 04/14/2024    TSH 0.08 (L) 07/25/2024     Lab Results   Component Value Date    LDLCALC 84 02/16/2022     No results found for: \"BNP\"   No results found for: \"NTBNP\"       TODAY'S PLAN:     08/30/24  I am meeting patient for the first time today  Warm, euvolemic  No new cardiac complaints, feels generally well, slow mild " progression for SOB over past 1-2 years  Has reduced smoking and drinking etoh  Referred today for her falls. She states that in past there have been times that she suddenly fell upon raising her R arm up, once she fell down stairs, this continued even after etoh reduction she says. Never has dizziness, spinning, never LOC. No associated SOB or palpitations. Never has chest pain.    She is unaware of her prior holter result > we reviewed her acceptable, reassuring result    She is unaware of her penetrating Ao ulcer  Not on ASA or statin  Start statin today  Vascular referral given    BP acceptable  Fu per PCP in case needs anti-HTN Rx reduction near future    Deranged thyroids per primary    We discussed TLC, smoking and etoh cpomplete cessation, healthy weight gain, diet, exercise    Follow up:  With the general cardiology team in 3 mo. This patient does not have advanced heart failure needs at present.  In addition to follow up with their other medical providers    Studies:  Today I have reviewed all pertinent patient data/labs/imaging where available, including but not limited to the below studies. This includes my independent interpretation. Selected results may be displayed here but comprehensive listing is omitted for note clarity and can be found in the epic chart.    ECG.    Echo.    Stress.    Cath.    HPI:   79 y.o. female PMH and acute problems listed later in this note (a partial list may also be included within 'assessment' section) presents for consultation.  No new CP/SOB/dizziness/palpitations/syncope.  No new fatigue.  No new unintentional weight changes.  No new leg swelling, PND, pillow orthopnea.  No new fevers, chills, cough, nausea, vomiting, diarrhea, dysuria.        ROS:  10 point ROS negative except as specified in HPI    Past Medical History:   Diagnosis Date    Anxiety     Last Assessed: 6/12/2017     COPD (chronic obstructive pulmonary disease) (HCC)     Depression     FH: colonic polyps      Glaucoma     Hypertension     Last Assessed: 12/12/2017    Hypothyroidism     Last Assessed: 6/12/2017    Menopause     Normal vaginal delivery     Osteopenia     Pap smear abnormality of cervix with ASCUS favoring benign     Telangiectasis      Patient Active Problem List   Diagnosis    Hypertension    Anxiety    Hypothyroidism    Depression, recurrent ([facility])    Diarrhea    Hemorrhoids    SDH (subdural hematoma) ([facility])    Anxiety    Alcohol abuse    Neurologic gait dysfunction    Traumatic subdural hemorrhage with loss of consciousness of unspecified duration, initial encounter ([facility])    Syncope     No current facility-administered medications for this visit.      No Known Allergies  Social History     Socioeconomic History    Marital status: /Civil Union     Spouse name: Not on file    Number of children: Not on file    Years of education: Not on file    Highest education level: Not on file   Occupational History    Not on file   Tobacco Use    Smoking status: Every Day     Current packs/day: 1.00     Average packs/day: 1 pack/day for 35.0 years (35.0 ttl pk-yrs)     Types: Cigarettes    Smokeless tobacco: Never   Vaping Use    Vaping status: Never Used   Substance and Sexual Activity    Alcohol use: Yes     Alcohol/week: 21.0 standard drinks of alcohol     Types: 21 Glasses of wine per week     Comment: no more than 3 glasses of wine per day    Drug use: No    Sexual activity: Yes   Other Topics Concern    Not on file   Social History Narrative    ** Merged History Encounter **         Drinks Coffee - 1-2 cup a day   Exercises Daily      Social Determinants of Health     Financial Resource Strain: Low Risk  (10/12/2023)    Overall Financial Resource Strain (CARDIA)     Difficulty of Paying Living Expenses: Not very hard   Food Insecurity: No Food Insecurity (2/14/2023)    Hunger Vital Sign     Worried About Running Out of Food in the Last Year: Never true     Ran Out of Food in the Last Year: Never true  "  Transportation Needs: No Transportation Needs (10/12/2023)    PRAPARE - Transportation     Lack of Transportation (Medical): No     Lack of Transportation (Non-Medical): No   Physical Activity: Not on file   Stress: Not on file   Social Connections: Not on file   Intimate Partner Violence: Not on file   Housing Stability: Low Risk  (2/14/2023)    Housing Stability Vital Sign     Unable to Pay for Housing in the Last Year: No     Number of Places Lived in the Last Year: 1     Unstable Housing in the Last Year: No     Family History   Problem Relation Age of Onset    Heart disease Mother         Cardiac Disorder     Colonic polyp Mother     Hypertension Mother     Heart disease Father         Cardiac Dsiorder     Hypertension Father     Lung cancer Father     Breast cancer Sister 68    Stomach cancer Sister     Colonic polyp Brother     Lung cancer Brother     Hepatitis Daughter         Alcoholic    Arthritis Family     Lung cancer Family     No Known Problems Maternal Grandmother     No Known Problems Maternal Grandfather     No Known Problems Paternal Grandmother     No Known Problems Paternal Grandfather     No Known Problems Son     Esophageal cancer Sister 56       Physical Exam:  Vitals:    08/30/24 1339   BP: 108/62   BP Location: Left arm   Patient Position: Sitting   Cuff Size: Standard   Pulse: 66   SpO2: 100%   Weight: 40.6 kg (89 lb 6.4 oz)   Height: 5' 2\" (1.575 m)     Constitutional: NAD, non toxic, frail  Ears/nose/mouth/throat: atraumatic  CV: RRR, nl S1S2, no murmurs/rubs/gallups, no JVD, no HJR  Resp: CTABL  GI: Soft, NTND  MSK: no swollen joints in exposed areas  Extr: No edema, warm LE  Pysche: Normal affect  Neuro: appropriate in conversation  Skin: dry and intact in exposed areas    Labs & Results:  Lab Results   Component Value Date    WBC 7.3 07/25/2024    HGB 11.5 (L) 07/25/2024    HCT 34.4 (L) 07/25/2024    MCV 95.0 07/25/2024     07/25/2024     Lab Results   Component Value Date    " SODIUM 140 07/25/2024    K 3.9 07/25/2024     07/25/2024    CO2 30 07/25/2024    BUN 8 07/25/2024    CREATININE 0.40 (L) 07/25/2024    GLUC 96 07/25/2024    CALCIUM 9.2 07/25/2024       Counseling / Coordination of Care  Greater than 50% of total time was spent with the patient and / or family counseling and / or coordination of care. Discussion included diagnoses, most recent studies and any changes in treatment.    Thank you for the opportunity to participate in the care of this patient.    Anselmo Humphreys MD  Attending Physician  Advanced Heart Failure and Transplant Cardiology  [facility]

## 2024-08-30 ENCOUNTER — CONSULT (OUTPATIENT)
Dept: CARDIOLOGY CLINIC | Facility: CLINIC | Age: 79
End: 2024-08-30
Payer: COMMERCIAL

## 2024-08-30 VITALS
HEIGHT: 62 IN | BODY MASS INDEX: 16.45 KG/M2 | WEIGHT: 89.4 LBS | OXYGEN SATURATION: 100 % | HEART RATE: 66 BPM | SYSTOLIC BLOOD PRESSURE: 108 MMHG | DIASTOLIC BLOOD PRESSURE: 62 MMHG
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
## 2024-08-30 DIAGNOSIS — I77.9 AORTA DISORDER (HCC): ICD-10-CM

## 2024-08-30 DIAGNOSIS — I10 HYPERTENSION, UNSPECIFIED TYPE: ICD-10-CM

## 2024-08-30 PROCEDURE — 99204 OFFICE O/P NEW MOD 45 MIN: CPT | Performed by: STUDENT IN AN ORGANIZED HEALTH CARE EDUCATION/TRAINING PROGRAM

## 2024-08-30 RX ORDER — ATORVASTATIN CALCIUM 40 MG/1
40 TABLET, FILM COATED ORAL DAILY
Qty: 30 TABLET | Refills: 17 | Status: SHIPPED | OUTPATIENT
Start: 2024-08-30 | End: 2026-02-21

## 2024-08-30 NOTE — LETTER
August 30, 2024     Jasson Salinas MD  [address] [facility] [address]    Patient: Karli Maradiaga   YOB: 1945   Date of Visit: 8/30/2024       Dear Dr. Salinas:    Thank you for referring Karli Maradiaga to me for evaluation. Below are my notes for this consultation.    If you have questions, please do not hesitate to call me. I look forward to following your patient along with you.         Sincerely,        Anselmo Humphreys MD        CC: No Recipients    Anselmo Humphreys MD  8/30/2024  2:17 PM  Sign when Signing Visit  Advanced Heart Failure/Pulmonary Hypertension Outpatient Note - Karli Maradiaga 79 y.o. female MRN: [id number]    @ Encounter: [id number]      Assessment:  79 y.o. female PMH and acute problems listed later in this note (a partial list may also be included within 'assessment' section) presents for consultation.  I first met Karli Maradiaga on 08/30/24.  Referred by Jasson Salinas MD     Patient Active Problem List   Diagnosis   • Hypertension   • Anxiety   • Hypothyroidism   • Depression, recurrent (HCC)   • Diarrhea   • Hemorrhoids   • SDH (subdural hematoma) ([facility])   • Anxiety   • Alcohol abuse   • Neurologic gait dysfunction   • Traumatic subdural hemorrhage with loss of consciousness of unspecified duration, initial encounter ([facility])   • Syncope   CT 4/2024: 3. 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   Current smoker, cutting down  Etoh abuse, Used to drink 3-4 glasses wine daily, just cut down around 5/2024  Underweight  falls  6/2024 holter performed by another provider:  IMPRESSION:     Sinus rhythm throughout the study with an average HR of 73 bpm (), following a diurnal pattern.  low ectopy burden.  Symptom diary reviewed, no arrhythmias or ectopy associate with symptoms.        Today I have reviewed all pertinent labs/imaging/data including but not limited to:        Lab Units 07/25/24  1004 04/14/24  1201  "02/14/23  0437   CREATININE mg/dL 0.40* 0.64 0.33*         Lab Results   Component Value Date    K 3.9 07/25/2024     No results found for: \"HGBA1C\"  Lab Results   Component Value Date    HPU0FOAMRGUJ 37.313 (H) 04/14/2024    TSH 0.08 (L) 07/25/2024     Lab Results   Component Value Date    LDLCALC 84 02/16/2022     No results found for: \"BNP\"   No results found for: \"NTBNP\"       TODAY'S PLAN:     08/30/24  I am meeting patient for the first time today  Warm, euvolemic  No new cardiac complaints, feels generally well, slow mild progression for SOB over past 1-2 years  Has reduced smoking and drinking etoh  Referred today for her falls. She states that in past there have been times that she suddenly fell upon raising her R arm up, once she fell down stairs, this continued even after etoh reduction she says. Never has dizziness, spinning, never LOC. No associated SOB or palpitations. Never has chest pain.    She is unaware of her prior holter result > we reviewed her acceptable, reassuring result    She is unaware of her penetrating Ao ulcer  Not on ASA or statin  Start statin today  Vascular referral given    BP acceptable  Fu per PCP in case needs anti-HTN Rx reduction near future    Deranged thyroids per primary    We discussed TLC, smoking and etoh cpomplete cessation, healthy weight gain, diet, exercise    Follow up:  With the general cardiology team in 3 mo. This patient does not have advanced heart failure needs at present.  In addition to follow up with their other medical providers    Studies:  Today I have reviewed all pertinent patient data/labs/imaging where available, including but not limited to the below studies. This includes my independent interpretation. Selected results may be displayed here but comprehensive listing is omitted for note clarity and can be found in the epic chart.    ECG.    Echo.    Stress.    Cath.    HPI:   79 y.o. female PMH and acute problems listed later in this note (a " partial list may also be included within 'assessment' section) presents for consultation.  No new CP/SOB/dizziness/palpitations/syncope.  No new fatigue.  No new unintentional weight changes.  No new leg swelling, PND, pillow orthopnea.  No new fevers, chills, cough, nausea, vomiting, diarrhea, dysuria.        ROS:  10 point ROS negative except as specified in HPI    Past Medical History:   Diagnosis Date   • Anxiety     Last Assessed: 6/12/2017    • COPD (chronic obstructive pulmonary disease) ([facility])    • Depression    • FH: colonic polyps    • Glaucoma    • Hypertension     Last Assessed: 12/12/2017   • Hypothyroidism     Last Assessed: 6/12/2017   • Menopause    • Normal vaginal delivery    • Osteopenia    • Pap smear abnormality of cervix with ASCUS favoring benign    • Telangiectasis      Patient Active Problem List   Diagnosis   • Hypertension   • Anxiety   • Hypothyroidism   • Depression, recurrent ([facility])   • Diarrhea   • Hemorrhoids   • SDH (subdural hematoma) ([facility])   • Anxiety   • Alcohol abuse   • Neurologic gait dysfunction   • Traumatic subdural hemorrhage with loss of consciousness of unspecified duration, initial encounter ([facility])   • Syncope     No current facility-administered medications for this visit.      No Known Allergies  Social History     Socioeconomic History   • Marital status: /Civil Union     Spouse name: Not on file   • Number of children: Not on file   • Years of education: Not on file   • Highest education level: Not on file   Occupational History   • Not on file   Tobacco Use   • Smoking status: Every Day     Current packs/day: 1.00     Average packs/day: 1 pack/day for 35.0 years (35.0 ttl pk-yrs)     Types: Cigarettes   • Smokeless tobacco: Never   Vaping Use   • Vaping status: Never Used   Substance and Sexual Activity   • Alcohol use: Yes     Alcohol/week: 21.0 standard drinks of alcohol     Types: 21 Glasses of wine per week     Comment: no more than 3 glasses of wine per day    • Drug use: No   • Sexual activity: Yes   Other Topics Concern   • Not on file   Social History Narrative    ** Merged History Encounter **         Drinks Coffee - 1-2 cup a day   Exercises Daily      Social Determinants of Health     Financial Resource Strain: Low Risk  (10/12/2023)    Overall Financial Resource Strain (CARDIA)    • Difficulty of Paying Living Expenses: Not very hard   Food Insecurity: No Food Insecurity (2/14/2023)    Hunger Vital Sign    • Worried About Running Out of Food in the Last Year: Never true    • Ran Out of Food in the Last Year: Never true   Transportation Needs: No Transportation Needs (10/12/2023)    PRAPARE - Transportation    • Lack of Transportation (Medical): No    • Lack of Transportation (Non-Medical): No   Physical Activity: Not on file   Stress: Not on file   Social Connections: Not on file   Intimate Partner Violence: Not on file   Housing Stability: Low Risk  (2/14/2023)    Housing Stability Vital Sign    • Unable to Pay for Housing in the Last Year: No    • Number of Places Lived in the Last Year: 1    • Unstable Housing in the Last Year: No     Family History   Problem Relation Age of Onset   • Heart disease Mother         Cardiac Disorder    • Colonic polyp Mother    • Hypertension Mother    • Heart disease Father         Cardiac Dsiorder    • Hypertension Father    • Lung cancer Father    • Breast cancer Sister 68   • Stomach cancer Sister    • Colonic polyp Brother    • Lung cancer Brother    • Hepatitis Daughter         Alcoholic   • Arthritis Family    • Lung cancer Family    • No Known Problems Maternal Grandmother    • No Known Problems Maternal Grandfather    • No Known Problems Paternal Grandmother    • No Known Problems Paternal Grandfather    • No Known Problems Son    • Esophageal cancer Sister 56       Physical Exam:  Vitals:    08/30/24 1339   BP: 108/62   BP Location: Left arm   Patient Position: Sitting   Cuff Size: Standard   Pulse: 66   SpO2: 100%  "  Weight: 40.6 kg (89 lb 6.4 oz)   Height: 5' 2\" (1.575 m)     Constitutional: NAD, non toxic, frail  Ears/nose/mouth/throat: atraumatic  CV: RRR, nl S1S2, no murmurs/rubs/gallups, no JVD, no HJR  Resp: CTABL  GI: Soft, NTND  MSK: no swollen joints in exposed areas  Extr: No edema, warm LE  Pysche: Normal affect  Neuro: appropriate in conversation  Skin: dry and intact in exposed areas    Labs & Results:  Lab Results   Component Value Date    WBC 7.3 07/25/2024    HGB 11.5 (L) 07/25/2024    HCT 34.4 (L) 07/25/2024    MCV 95.0 07/25/2024     07/25/2024     Lab Results   Component Value Date    SODIUM 140 07/25/2024    K 3.9 07/25/2024     07/25/2024    CO2 30 07/25/2024    BUN 8 07/25/2024    CREATININE 0.40 (L) 07/25/2024    GLUC 96 07/25/2024    CALCIUM 9.2 07/25/2024       Counseling / Coordination of Care  Greater than 50% of total time was spent with the patient and / or family counseling and / or coordination of care. Discussion included diagnoses, most recent studies and any changes in treatment.    Thank you for the opportunity to participate in the care of this patient.    Anselmo Humphreys MD  Attending Physician  Advanced Heart Failure and Transplant Cardiology  [facility]  "

## 2024-09-24 DIAGNOSIS — I77.9 AORTA DISORDER (HCC): ICD-10-CM

## 2024-09-24 RX ORDER — ATORVASTATIN CALCIUM 40 MG/1
40 TABLET, FILM COATED ORAL DAILY
Qty: 90 TABLET | Refills: 1 | Status: SHIPPED | OUTPATIENT
Start: 2024-09-24

## 2024-10-26 DIAGNOSIS — E03.9 HYPOTHYROIDISM, UNSPECIFIED TYPE: ICD-10-CM

## 2024-10-28 RX ORDER — LEVOTHYROXINE SODIUM 50 UG/1
TABLET ORAL
Qty: 100 TABLET | Refills: 0 | Status: SHIPPED | OUTPATIENT
Start: 2024-10-28

## 2024-11-11 ENCOUNTER — IOP CHECK (OUTPATIENT)
Dept: URBAN - METROPOLITAN AREA CLINIC 6 | Facility: CLINIC | Age: 79
End: 2024-11-11

## 2024-11-11 DIAGNOSIS — H40.051: ICD-10-CM

## 2024-11-11 DIAGNOSIS — H04.123: ICD-10-CM

## 2024-11-11 DIAGNOSIS — Z96.1: ICD-10-CM

## 2024-11-11 DIAGNOSIS — H40.1113: ICD-10-CM

## 2024-11-11 DIAGNOSIS — H40.1121: ICD-10-CM

## 2024-11-11 PROCEDURE — 92202 OPSCPY EXTND ON/MAC DRAW: CPT

## 2024-11-11 PROCEDURE — 92014 COMPRE OPH EXAM EST PT 1/>: CPT

## 2024-11-11 PROCEDURE — 92133 CPTRZD OPH DX IMG PST SGM ON: CPT

## 2024-11-11 ASSESSMENT — VISUAL ACUITY
OU_CC: J1
OS_SC: 20/40-1

## 2024-11-11 ASSESSMENT — TONOMETRY
OS_IOP_MMHG: 23
OD_IOP_MMHG: 9

## 2024-11-12 NOTE — PROGRESS NOTES
Ambulatory Visit  Name: Karli Maradiaga      : 1945      MRN: [id number]  Encounter Provider: Rodríguez Neff DO  Encounter Date: 2024   Enc[facility] [place]    Assessment & Plan  Aorta disorder (HCC)    Orders:    Ambulatory referral to Vascular Surgery    Penetrating atherosclerotic ulcer of aorta (HCC)  79-year-old female referred for infrarenal penetrating aortic ulcer.  This was an incidental finding on a CAT scan done in April.  Her CT was reviewed by myself it demonstrates a left lateral ulcer in the mid infrarenal aorta 2.5 cm in width by 1.5 cm in depth.  There is no other significant occlusive disease on the CT scan.  In discussing with her today she is clinically asymptomatic she denies claudication she has had no prior abdominal surgeries except for soft tissue injury.  She is currently smoking but she is cut back consistent significantly she does have prominent popliteal pulses bilaterally and palpable pedal pulses bilaterally I did recommend an EVAR her anatomy does appear amenable I would plan to use a [place] device for this I also stressed the need for smoking cessation and will give her referral to the smoking cessation hotline.  I will also a lower order a lower extremity arterial duplex for her prominent popliteal pulse.  In addition she is already scheduled to see cardiology next week I would request preoperative cardiology clearance for this case prior to scheduling.  After discussing all risks benefits and alternative therapies she consented to proceed.     Orders:    Case request operating room: REPAIR ANEURYSM ENDOVASCULAR ABDOMINAL AORTIC  (EVAR); Standing    Type and screen; Future    Ambulatory Referral to Smoking Cessation Program; Future    VAS ARTERIAL DUPLEX- LOWER LIMB BILATERAL; Future      History of Present Illness       New patient, referred for atherosclerotic ulcer of the infrarenal abdominal aorta and presents today for evaluation. CT  "abdomen/pelvis done 4/14/24.     Karli Maradiaga is a 79 y.o. female    History obtained from : patient  Review of Systems   Constitutional: Negative.    HENT: Negative.     Eyes: Negative.    Respiratory:  Positive for cough and shortness of breath.    Cardiovascular: Negative.    Gastrointestinal: Negative.    Endocrine: Negative.    Genitourinary: Negative.    Musculoskeletal: Negative.    Skin: Negative.    Allergic/Immunologic: Negative.    Hematological: Negative.    Psychiatric/Behavioral: Negative.     I have reviewed the ROS above and made changes as needed.        Objective     /80 (BP Location: Left arm, Patient Position: Sitting)   Pulse 82   Ht 5' 2\" (1.575 m)   Wt 42.6 kg (94 lb)   BMI 17.19 kg/m²     Physical Exam    General  Exam: alert, awake, oriented, no distress, consistent with stated age    Integumentary  Exam: warm, dry, no gross lesions, no bruises and normal color    Head and Neck  Exam: supple, no bruits, trachea midline, no JVD, no mass or palpable nodes    Adbomen  Exam: soft, non-tender, non-distended, no pulsatile abdominal masses, no abdominal bruit    Peripheral Vascular  Exam: no clubbing of the digits of the upper extremity, no cyanosis, no edema, both feet are warm, radial pulses 2+ bilaterally, skin well perfused, without and no varicosities.    Prominent wide popliteal pulse bilaterally    Upper Extremity:  Palpation: Radial pulse- Bilateral 2+    Lower Extremity:  Palpation: Femoral pulse- Bilateral 2+         Popliteal pulse - Bilateral 2+        Dorsalis Pedis - Bilateral 2+         Neurologic  Exam:alert, non-focal, oriented x 3, cranial nerves II-XII grossly intact    Administrative Statements   I have spent a total time of 35 minutes in caring for this patient on the day of the visit/encounter including Diagnostic results, Prognosis, Risks and benefits of tx options, Importance of tx compliance, Risk factor reductions, and Reviewing / ordering tests, medicine, " procedures  .      Tobacco use is a significant patient-modifiable risk factor for this patient’s vascular disease with multiple vascular comorbidities, and a significant risk factor for failure of and complications from any endovascular or surgical interventions.    I explained to the patient the effects of smoking including peripheral artery disease, coronary artery disease, cerebrovascular disease as well as cancer and chronic obstructive pulmonary disease. I asked the patient to stop smoking immediately. It is never too late to quit, and many studies show significant health benefits as well as economical savings after smoking cessation. I offered to the patient nicotine replacement therapy as well as referral to the smoking cessation program and access to the quit line 9-555-SMQAEXV or ambulatory referral to our network smoking cessation program.    Based on our conversation, this patient does not appear ready to quit    And declined my offer of nicotine replacement or tobacco cessation medications    The patient did not set a quit date. I will continue to  follow up on this issue at our next scheduled visit.     I spent approximately 10 minutes on tobacco cessation counseling with this patient.            Operative Scheduling Information:    Hospital:  [facility] or [facility]    Physician:  Aishwarya    Surgery: EVAR, bilateral percutaneous femoral access    Urgency:  Standard    Level:  Level 4: Outpatients to be scheduled for screening procedures and elective surgery that can be delayed for longer than one month without reasonable expectation of detriment to patient.    Case Length:  Normal    Post-op Bed:  Stepdown    OR Table:  IR    Equipment Needs:  Rep: Hank Wadas (GORE Aortic)    Medication Instructions:  None    Hydration:  No    Contrast Allergy:  no

## 2024-11-13 ENCOUNTER — CONSULT (OUTPATIENT)
Dept: VASCULAR SURGERY | Facility: CLINIC | Age: 79
End: 2024-11-13
Payer: COMMERCIAL

## 2024-11-13 ENCOUNTER — TELEPHONE (OUTPATIENT)
Dept: VASCULAR SURGERY | Facility: CLINIC | Age: 79
End: 2024-11-13

## 2024-11-13 VITALS
HEIGHT: 62 IN | SYSTOLIC BLOOD PRESSURE: 124 MMHG | HEART RATE: 82 BPM | WEIGHT: 94 LBS | DIASTOLIC BLOOD PRESSURE: 80 MMHG | BODY MASS INDEX: 17.3 KG/M2

## 2024-11-13 DIAGNOSIS — I77.9 AORTA DISORDER (HCC): ICD-10-CM

## 2024-11-13 DIAGNOSIS — I71.9 PENETRATING ATHEROSCLEROTIC ULCER OF AORTA (HCC): Primary | ICD-10-CM

## 2024-11-13 PROCEDURE — 99205 OFFICE O/P NEW HI 60 MIN: CPT | Performed by: SURGERY

## 2024-11-13 RX ORDER — CEFAZOLIN SODIUM 2 G/50ML
2000 SOLUTION INTRAVENOUS ONCE
OUTPATIENT
Start: 2024-11-13 | End: 2024-11-13

## 2024-11-13 RX ORDER — CHLORHEXIDINE GLUCONATE ORAL RINSE 1.2 MG/ML
15 SOLUTION DENTAL ONCE
OUTPATIENT
Start: 2024-11-13 | End: 2024-11-13

## 2024-11-13 NOTE — ASSESSMENT & PLAN NOTE
79-year-old female referred for infrarenal penetrating aortic ulcer.  This was an incidental finding on a CAT scan done in April.  Her CT was reviewed by myself it demonstrates a left lateral ulcer in the mid infrarenal aorta 2.5 cm in width by 1.5 cm in depth.  There is no other significant occlusive disease on the CT scan.  In discussing with her today she is clinically asymptomatic she denies claudication she has had no prior abdominal surgeries except for soft tissue injury.  She is currently smoking but she is cut back consistent significantly she does have prominent popliteal pulses bilaterally and palpable pedal pulses bilaterally I did recommend an EVAR her anatomy does appear amenable I would plan to use a [place] device for this I also stressed the need for smoking cessation and will give her referral to the smoking cessation hotline.  I will also a lower order a lower extremity arterial duplex for her prominent popliteal pulse.  In addition she is already scheduled to see cardiology next week I would request preoperative cardiology clearance for this case prior to scheduling.  After discussing all risks benefits and alternative therapies she consented to proceed.     Orders:    Case request operating room: REPAIR ANEURYSM ENDOVASCULAR ABDOMINAL AORTIC  (EVAR); Standing    Type and screen; Future    Ambulatory Referral to Smoking Cessation Program; Future    VAS ARTERIAL DUPLEX- LOWER LIMB BILATERAL; Future

## 2024-11-13 NOTE — TELEPHONE ENCOUNTER
REMINDER: Under Reason For Call, comments MUST be formatted as:   (Surgeon's Initials) / (Procedure)      Special Instructions / FYI: Level 4                                               Cardiac clearance.  Appointment with Dr. Mejia Day 11/20/2024, 10:00 am.      Consent: I certify that patient has signed, printed, timed, and dated their surgery consent.  I certify that the patient's LEGAL NAME and DATE OF BIRTH are written in the upper left corner on BOTH sides of the consent.  I certify that BOTH sides of the completed surgery consent have been scanned into the patient's Epic chart by myself on 11/13/2024.  Yes, I have LABELED the consent in Epic as Consent for Vascular Procedure.     For Surgical Clearances     Levels   1-3   ROUTE this encounter to [facility] Clearance Pool (AND)   [facility] Surgery Coordinator Pool     Level   4   ROUTE this encounter to [facility] Surgery Coordinator Pool       HYDRATION CLEARANCES   ONLY ROUTE TO  [facility] Clearance Pool       Yes, I have ROUTED this encounter to [facility] Surgery Coordinator and/or [facility] Clearance Pool.

## 2024-11-20 ENCOUNTER — OFFICE VISIT (OUTPATIENT)
Dept: CARDIOLOGY CLINIC | Facility: CLINIC | Age: 79
End: 2024-11-20

## 2024-11-20 VITALS
SYSTOLIC BLOOD PRESSURE: 106 MMHG | DIASTOLIC BLOOD PRESSURE: 56 MMHG | HEART RATE: 76 BPM | HEIGHT: 62 IN | WEIGHT: 95.1 LBS | BODY MASS INDEX: 17.5 KG/M2 | OXYGEN SATURATION: 99 %

## 2024-11-20 DIAGNOSIS — R94.31 ABNORMAL ELECTROCARDIOGRAM (ECG) (EKG): ICD-10-CM

## 2024-11-20 DIAGNOSIS — Z72.0 TOBACCO USE: ICD-10-CM

## 2024-11-20 DIAGNOSIS — I77.9 AORTA DISORDER (HCC): ICD-10-CM

## 2024-11-20 DIAGNOSIS — I10 HYPERTENSION, UNSPECIFIED TYPE: ICD-10-CM

## 2024-11-20 DIAGNOSIS — Z01.818 PRE-OP EVALUATION: Primary | ICD-10-CM

## 2024-11-20 RX ORDER — ASPIRIN 81 MG/1
81 TABLET, CHEWABLE ORAL DAILY
Qty: 90 TABLET | Refills: 3 | Status: SHIPPED | OUTPATIENT
Start: 2024-11-20

## 2024-11-20 RX ORDER — METOPROLOL SUCCINATE 25 MG/1
25 TABLET, EXTENDED RELEASE ORAL DAILY
Qty: 30 TABLET | Refills: 5 | Status: SHIPPED | OUTPATIENT
Start: 2024-11-20

## 2024-11-20 NOTE — PATIENT INSTRUCTIONS
It was good to see you today. We are going to order a nuclear stress test as well an echo to help us determine the function of your heart. We are also going to start you on a baby aspirin that you should take once a day. I'd like you to start a beta blocker called metoprolol that you'll take once a day as well. Please stop taking your amlodpine because the beta blocker reduces your blood pressure as well.

## 2024-11-20 NOTE — PROGRESS NOTES
Cardiology     Clinic Visit Note  Karli Maradiaga 79 y.o. female   MRN: [id number]    Assessment and Plan      Diagnoses and all orders for this visit:    Pre-op evaluation  -     Lipid panel; Future  -     aspirin 81 mg chewable tablet; Chew 1 tablet (81 mg total) daily  -     metoprolol succinate (TOPROL-XL) 25 mg 24 hr tablet; Take 1 tablet (25 mg total) by mouth daily  -     Echo complete w/ contrast if indicated; Future  -     NM myocardial perfusion spect (rx stress and/or rest); Future    Aorta disorder (HCC)  -     POCT ECG  -     Lipid panel; Future  -     aspirin 81 mg chewable tablet; Chew 1 tablet (81 mg total) daily  -     metoprolol succinate (TOPROL-XL) 25 mg 24 hr tablet; Take 1 tablet (25 mg total) by mouth daily  -     Echo complete w/ contrast if indicated; Future  -     NM myocardial perfusion spect (rx stress and/or rest); Future    Hypertension, unspecified type  -     POCT ECG  -     Lipid panel; Future  -     aspirin 81 mg chewable tablet; Chew 1 tablet (81 mg total) daily  -     metoprolol succinate (TOPROL-XL) 25 mg 24 hr tablet; Take 1 tablet (25 mg total) by mouth daily  -     Echo complete w/ contrast if indicated; Future  -     NM myocardial perfusion spect (rx stress and/or rest); Future    Abnormal electrocardiogram (ECG) (EKG)  -     Lipid panel; Future  -     NM myocardial perfusion spect (rx stress and/or rest); Future    Tobacco use  -     Lipid panel; Future  -     NM myocardial perfusion spect (rx stress and/or rest); Future      Plan:  EKG today with QS waves in V1 and V2, concerning for prior silent infarct.  Patient has a long history of tobacco use and though she denies any history of chest pain or shortness of breath/dyspnea on exertion, she undoubtedly has some CAD given her age, tobacco history, and findings of aortopathy.  Though it was not the ideal study, there was some cardiac calcium noted on her CT abdomen pelvis which further suggest this.  She is euvolemic today and  lungs are clear.  She says that her surgery is scheduled for January.  I would like to further risk stratify her given her history and EKG.  I will order a nuclear stress test as above to assess for ischemia and infarction.  Will order a transthoracic echo  as well to further assess ejection fraction and wall motion abnormalities as ischemic cardiomyopathy is a potential concern here.  She currently is on atorvastatin 40 mg daily and is compliant without side effects.  Her LDL goal will be less than 55.  I will recheck a lipid panel for this and consider increasing her atorvastatin to 80 mg daily.  I will start aspirin 81 mg for presumed CAD.  Her blood pressure is 106/56 today, I will discontinue her amlodipine so that we can start metoprolol succinate 25 mg daily in anticipation of her operation.     Of note, I personally reached out to the patient's vascular physician Dr. Neff and they shared that the patient's surgery was not urgent/emergent, and that they would prefer a full cardiac workup including cardiac catheterization and PCI if necessary prior to surgery.    Schedule a follow-up appointment in 1 month for follow up of studies.      Chief Complaint: I am here for preop assessment for my procedure  Subjective     History of Present Illness:  PMH HTN, hypothyroidism, alcohol abuse, tobacco use.     The patient was recently seen by vascular surgery for an infrarenal left lateral penetrating aortic ulcer without significant occlusive disease that was an incidental finding on a CAT scan done in April.     She also has an episodic history of falls. She had a 48hr Holter monitor done 06/26/2024 which showed sinus rhythm and low ectopy burden, with no correlation to diary symptoms.  She was seen by cardiology for this and deemed not cardiac in etiology.    Today she only reports some left ankle pain and swelling that she incurred after a fall a few months ago.  No other concerns.  She has quit drinking alcohol  entirely and is slowly reducing her's cigarette use, currently 6 cigarettes a day.  Today she tells me that she is able to walk up a flight of stairs without any issues.  She routinely carries groceries in from the house with no symptoms.  She says that she would have no issue walking multiple blocks.  She occasionally feels fatigued, but denies any chest pain or shortness of breath or palpitations or lower extremity swelling.  No recent weight loss    Previous Cardiology Workup:  TREADMILL STRESS  No results found for this or any previous visit.     ----------------------------------------------------------------------------------------------  NUCLEAR STRESS TEST: No results found for this or any previous visit.    No results found for this or any previous visit.      --------------------------------------------------------------------------------  CATH:  No results found for this or any previous visit.    --------------------------------------------------------------------------------  ECHO:   No results found for this or any previous visit.    No results found for this or any previous visit.    --------------------------------------------------------------------------------  HOLTER  No results found for this or any previous visit.    --------------------------------------------------------------------------------  CAROTIDS  No results found for this or any previous visit.       ---------------------------------------------------------------------------------  Review of Systems   Constitutional:  Negative for chills and fever.   HENT:  Negative for ear pain and sore throat.    Eyes:  Negative for pain and visual disturbance.   Respiratory:  Negative for cough and shortness of breath.    Cardiovascular:  Negative for chest pain and palpitations.   Gastrointestinal:  Negative for abdominal pain and vomiting.   Genitourinary:  Negative for dysuria and hematuria.   Musculoskeletal:  Positive for arthralgias (left  ankle pain). Negative for back pain.   Skin:  Negative for color change and rash.   Neurological:  Negative for seizures and syncope.   All other systems reviewed and are negative.        Current Outpatient Medications:     acetaminophen (TYLENOL) 325 mg tablet, Take 2 tablets (650 mg total) by mouth every 6 (six) hours as needed for mild pain, Disp: , Rfl: 0    ALPRAZolam (XANAX) 1 mg tablet, Take 1 tablet (1 mg total) by mouth 2 (two) times a day as needed for anxiety, Disp: 60 tablet, Rfl: 3    aspirin 81 mg chewable tablet, Chew 1 tablet (81 mg total) daily, Disp: 90 tablet, Rfl: 3    atorvastatin (LIPITOR) 40 mg tablet, take 1 tablet by mouth once daily, Disp: 90 tablet, Rfl: 1    citalopram (CeleXA) 10 mg tablet, take 1 tablet by mouth once daily, Disp: 100 tablet, Rfl: 1    latanoprost (XALATAN) 0.005 % ophthalmic solution, instill 1 drop into both eyes every evening, Disp: , Rfl:     levothyroxine 50 mcg tablet, take 1 tablet by mouth once daily in THE EARLY MORNING, Disp: 100 tablet, Rfl: 0    metoprolol succinate (TOPROL-XL) 25 mg 24 hr tablet, Take 1 tablet (25 mg total) by mouth daily, Disp: 30 tablet, Rfl: 5  Past Medical History:   Diagnosis Date    Anxiety     Last Assessed: 6/12/2017     COPD (chronic obstructive pulmonary disease) (HCC)     Depression     FH: colonic polyps     Glaucoma     Hypertension     Last Assessed: 12/12/2017    Hypothyroidism     Last Assessed: 6/12/2017    Menopause     Normal vaginal delivery     Osteopenia     Pap smear abnormality of cervix with ASCUS favoring benign     Telangiectasis      Past Surgical History:   Procedure Laterality Date    CATARACT EXTRACTION      COLONOSCOPY      complete    DILATION AND CURETTAGE OF UTERUS  01/10/1978    HEMORROIDECTOMY      x2    LAPAROSCOPY      Diagnostic     TUBAL LIGATION       Social History     Socioeconomic History    Marital status: /Civil Union     Spouse name: Not on file    Number of children: Not on file     Years of education: Not on file    Highest education level: Not on file   Occupational History    Not on file   Tobacco Use    Smoking status: Every Day     Current packs/day: 1.00     Average packs/day: 1 pack/day for 35.0 years (35.0 ttl pk-yrs)     Types: Cigarettes    Smokeless tobacco: Never   Vaping Use    Vaping status: Never Used   Substance and Sexual Activity    Alcohol use: Yes     Alcohol/week: 21.0 standard drinks of alcohol     Types: 21 Glasses of wine per week     Comment: no more than 3 glasses of wine per day    Drug use: No    Sexual activity: Yes   Other Topics Concern    Not on file   Social History Narrative    ** Merged History Encounter **         Drinks Coffee - 1-2 cup a day   Exercises Daily      Social Drivers of Health     Financial Resource Strain: Low Risk  (10/12/2023)    Overall Financial Resource Strain (CARDIA)     Difficulty of Paying Living Expenses: Not very hard   Food Insecurity: No Food Insecurity (2/14/2023)    Hunger Vital Sign     Worried About Running Out of Food in the Last Year: Never true     Ran Out of Food in the Last Year: Never true   Transportation Needs: No Transportation Needs (10/12/2023)    PRAPARE - Transportation     Lack of Transportation (Medical): No     Lack of Transportation (Non-Medical): No   Physical Activity: Not on file   Stress: Not on file   Social Connections: Not on file   Intimate Partner Violence: Not on file   Housing Stability: Low Risk  (2/14/2023)    Housing Stability Vital Sign     Unable to Pay for Housing in the Last Year: No     Number of Places Lived in the Last Year: 1     Unstable Housing in the Last Year: No     Family History   Problem Relation Age of Onset    Heart disease Mother         Cardiac Disorder     Colonic polyp Mother     Hypertension Mother     Heart disease Father         Cardiac Dsiorder     Hypertension Father     Lung cancer Father     Breast cancer Sister 68    Stomach cancer Sister     Colonic polyp Brother      "Lung cancer Brother     Hepatitis Daughter         Alcoholic    Arthritis Family     Lung cancer Family     No Known Problems Maternal Grandmother     No Known Problems Maternal Grandfather     No Known Problems Paternal Grandmother     No Known Problems Paternal Grandfather     No Known Problems Son     Esophageal cancer Sister 56     No Known Allergies    Objective     Vitals:    11/20/24 1012   BP: 106/56   BP Location: Left arm   Patient Position: Sitting   Cuff Size: Child   Pulse: 76   SpO2: 99%   Weight: 43.1 kg (95 lb 1.6 oz)   Height: 5' 2\" (1.575 m)       Physical exam:     Physical Exam  Vitals and nursing note reviewed.   Constitutional:       General: She is not in acute distress.     Appearance: Normal appearance. She is well-developed. She is not ill-appearing.   HENT:      Head: Normocephalic and atraumatic.      Right Ear: External ear normal.      Left Ear: External ear normal.      Mouth/Throat:      Mouth: Mucous membranes are moist.   Eyes:      Extraocular Movements: Extraocular movements intact.      Conjunctiva/sclera: Conjunctivae normal.      Pupils: Pupils are equal, round, and reactive to light.   Cardiovascular:      Rate and Rhythm: Normal rate and regular rhythm.      Pulses: Normal pulses.      Heart sounds: Normal heart sounds. No murmur heard.     No friction rub. No gallop.   Pulmonary:      Effort: Pulmonary effort is normal. No respiratory distress.      Breath sounds: Normal breath sounds. No wheezing, rhonchi or rales.   Abdominal:      General: Bowel sounds are normal. There is no distension.      Palpations: Abdomen is soft.      Tenderness: There is no abdominal tenderness. There is no guarding.      Hernia: No hernia is present.   Musculoskeletal:         General: No swelling or deformity.      Cervical back: Neck supple.      Right lower leg: No edema.      Left lower leg: No edema.   Skin:     General: Skin is warm and dry.      Coloration: Skin is not jaundiced.      " Findings: No bruising, lesion or rash.   Neurological:      General: No focal deficit present.      Mental Status: She is alert and oriented to person, place, and time.           ==  PLEASE NOTE:  This encounter was completed utilizing the [employer] Direct Speech Voice Recognition Software. Grammatical errors, random word insertions, pronoun errors and incomplete sentences are occasional consequences of the system due to software limitations, ambient noise and hardware issues.These may be missed by proof reading prior to affixing electronic signature. Any questions or concerns about the content, text or information contained within the body of this dictation should be directly addressed to the physician for clarification. Please do not hesitate to call me directly if you have any any questions or concerns.

## 2024-11-21 NOTE — TELEPHONE ENCOUNTER
Patient is having further cardiac  work up and will be seeing the cardiologist 12/24/24 to be cleared.

## 2024-12-09 ENCOUNTER — TELEPHONE (OUTPATIENT)
Dept: CARDIOLOGY CLINIC | Facility: CLINIC | Age: 79
End: 2024-12-09

## 2024-12-12 LAB
ABO GROUP BLD: NORMAL
BLD GP AB SCN SERPL QL: NORMAL
CHOLEST SERPL-MCNC: 145 MG/DL
CHOLEST/HDLC SERPL: 2.5 (CALC)
HDLC SERPL-MCNC: 57 MG/DL
LDLC SERPL CALC-MCNC: 72 MG/DL (CALC)
NONHDLC SERPL-MCNC: 88 MG/DL (CALC)
RH BLD: NORMAL
TRIGL SERPL-MCNC: 77 MG/DL

## 2024-12-14 DIAGNOSIS — I77.9 AORTA DISORDER (HCC): ICD-10-CM

## 2024-12-14 DIAGNOSIS — Z01.818 PRE-OP EVALUATION: ICD-10-CM

## 2024-12-14 DIAGNOSIS — I10 HYPERTENSION, UNSPECIFIED TYPE: ICD-10-CM

## 2024-12-16 RX ORDER — METOPROLOL SUCCINATE 25 MG/1
25 TABLET, EXTENDED RELEASE ORAL DAILY
Qty: 90 TABLET | Refills: 1 | Status: SHIPPED | OUTPATIENT
Start: 2024-12-16

## 2024-12-19 ENCOUNTER — HOSPITAL ENCOUNTER (OUTPATIENT)
Dept: NON INVASIVE DIAGNOSTICS | Facility: CLINIC | Age: 79
Discharge: HOME | End: 2024-12-19
Payer: COMMERCIAL

## 2024-12-19 ENCOUNTER — HOSPITAL ENCOUNTER (OUTPATIENT)
Dept: NON INVASIVE DIAGNOSTICS | Facility: CLINIC | Age: 79
End: 2024-12-19
Payer: COMMERCIAL

## 2024-12-19 VITALS
WEIGHT: 95 LBS | SYSTOLIC BLOOD PRESSURE: 106 MMHG | DIASTOLIC BLOOD PRESSURE: 56 MMHG | BODY MASS INDEX: 17.48 KG/M2 | HEART RATE: 60 BPM | HEIGHT: 62 IN

## 2024-12-19 DIAGNOSIS — I10 HYPERTENSION, UNSPECIFIED TYPE: ICD-10-CM

## 2024-12-19 DIAGNOSIS — Z72.0 TOBACCO USE: ICD-10-CM

## 2024-12-19 DIAGNOSIS — Z01.818 PRE-OP EVALUATION: ICD-10-CM

## 2024-12-19 DIAGNOSIS — I77.9 AORTA DISORDER (HCC): ICD-10-CM

## 2024-12-19 DIAGNOSIS — R94.31 ABNORMAL ELECTROCARDIOGRAM (ECG) (EKG): ICD-10-CM

## 2024-12-19 LAB
AORTIC ROOT: 2.6 CM
APICAL FOUR CHAMBER EJECTION FRACTION: 58 %
ASCENDING AORTA: 3.4 CM
BSA FOR ECHO PROCEDURE: 1.39 M2
E WAVE DECELERATION TIME: 204 MS
E/A RATIO: 1.45
FRACTIONAL SHORTENING: 26 (ref 28–44)
INTERVENTRICULAR SEPTUM IN DIASTOLE (PARASTERNAL SHORT AXIS VIEW): 1.2 CM
INTERVENTRICULAR SEPTUM: 1.2 CM (ref 0.6–1.1)
LAAS-AP2: 18.2 CM2
LAAS-AP4: 24.3 CM2
LEFT ATRIUM SIZE: 3.6 CM
LEFT ATRIUM VOLUME (MOD BIPLANE): 62 ML
LEFT ATRIUM VOLUME INDEX (MOD BIPLANE): 44.6 ML/M2
LEFT INTERNAL DIMENSION IN SYSTOLE: 3.2 CM (ref 2.1–4)
LEFT VENTRICULAR INTERNAL DIMENSION IN DIASTOLE: 4.3 CM (ref 3.5–6)
LEFT VENTRICULAR POSTERIOR WALL IN END DIASTOLE: 1 CM
LEFT VENTRICULAR STROKE VOLUME: 40 ML
LVSV (TEICH): 40 ML
MV E'TISSUE VEL-SEP: 6 CM/S
MV PEAK A VEL: 0.33 M/S
MV PEAK E VEL: 48 CM/S
MV STENOSIS PRESSURE HALF TIME: 59 MS
MV VALVE AREA P 1/2 METHOD: 3.73
RA PRESSURE ESTIMATED: 3 MMHG
RIGHT ATRIAL 2D VOLUME: 46 ML
RIGHT ATRIUM AREA SYSTOLE A4C: 15.7 CM2
RIGHT VENTRICLE ID DIMENSION: 4.4 CM
RV PSP: 38 MMHG
SL CV LEFT ATRIUM LENGTH A2C: 5.4 CM
SL CV LV EF: 55
SL CV PED ECHO LEFT VENTRICLE DIASTOLIC VOLUME (MOD BIPLANE) 2D: 81 ML
SL CV PED ECHO LEFT VENTRICLE SYSTOLIC VOLUME (MOD BIPLANE) 2D: 42 ML
TR MAX PG: 35 MMHG
TR PEAK VELOCITY: 3 M/S
TRICUSPID ANNULAR PLANE SYSTOLIC EXCURSION: 1.9 CM
TRICUSPID VALVE PEAK REGURGITATION VELOCITY: 2.96 M/S

## 2024-12-19 PROCEDURE — 78452 HT MUSCLE IMAGE SPECT MULT: CPT | Performed by: INTERNAL MEDICINE

## 2024-12-19 PROCEDURE — 93018 CV STRESS TEST I&R ONLY: CPT | Performed by: INTERNAL MEDICINE

## 2024-12-19 PROCEDURE — 93016 CV STRESS TEST SUPVJ ONLY: CPT | Performed by: INTERNAL MEDICINE

## 2024-12-19 PROCEDURE — 93306 TTE W/DOPPLER COMPLETE: CPT | Performed by: INTERNAL MEDICINE

## 2024-12-19 PROCEDURE — 78452 HT MUSCLE IMAGE SPECT MULT: CPT

## 2024-12-19 PROCEDURE — 93017 CV STRESS TEST TRACING ONLY: CPT

## 2024-12-19 PROCEDURE — A9502 TC99M TETROFOSMIN: HCPCS

## 2024-12-19 PROCEDURE — 93306 TTE W/DOPPLER COMPLETE: CPT

## 2024-12-20 ENCOUNTER — HOSPITAL ENCOUNTER (OUTPATIENT)
Dept: NON INVASIVE DIAGNOSTICS | Facility: CLINIC | Age: 79
Discharge: HOME | End: 2024-12-20
Payer: COMMERCIAL

## 2024-12-20 VITALS — WEIGHT: 95 LBS | BODY MASS INDEX: 17.48 KG/M2 | HEIGHT: 62 IN

## 2024-12-20 LAB
CHEST PAIN STATEMENT: NORMAL
MAX DIASTOLIC BP: 70 MMHG
MAX HR PERCENT: 69 %
MAX HR: 99 BPM
MAX PREDICTED HEART RATE: 143 BPM
NUC STRESS EJECTION FRACTION: 82 %
PROTOCOL NAME: NORMAL
RATE PRESSURE PRODUCT: 9720
REASON FOR TERMINATION: NORMAL
SL CV REST NUCLEAR ISOTOPE DOSE: 10.2 MCI
SL CV STRESS NUCLEAR ISOTOPE DOSE: 31.5 MCI
SL CV STRESS RECOVERY BP: NORMAL MMHG
SL CV STRESS RECOVERY HR: 85 BPM
SL CV STRESS RECOVERY O2 SAT: 98 %
STRESS ANGINA INDEX: 0
STRESS BASELINE BP: NORMAL MMHG
STRESS BASELINE HR: 69 BPM
STRESS O2 SAT REST: 96 %
STRESS PEAK HR: 90 BPM
STRESS POST ESTIMATED WORKLOAD: 1 METS
STRESS POST EXERCISE DUR MIN: 3 MIN
STRESS POST EXERCISE DUR SEC: 0 SEC
STRESS POST O2 SAT PEAK: 95 %
STRESS POST PEAK BP: 108 MMHG
STRESS POST PEAK HR: 99 BPM
STRESS POST PEAK SYSTOLIC BP: 110 MMHG
STRESS/REST PERFUSION RATIO: 0.99
TARGET HR FORMULA: NORMAL
TEST INDICATION: NORMAL

## 2024-12-20 RX ORDER — REGADENOSON 0.08 MG/ML
0.4 INJECTION, SOLUTION INTRAVENOUS ONCE
Status: COMPLETED | OUTPATIENT
Start: 2024-12-20 | End: 2024-12-20

## 2024-12-20 RX ADMIN — REGADENOSON 0.4 MG: 0.08 INJECTION, SOLUTION INTRAVENOUS at 08:14

## 2024-12-23 LAB
CHEST PAIN STATEMENT: NORMAL
MAX DIASTOLIC BP: 70 MMHG
MAX PREDICTED HEART RATE: 143 BPM
PROTOCOL NAME: NORMAL
REASON FOR TERMINATION: NORMAL
STRESS POST EXERCISE DUR MIN: 3 MIN
STRESS POST EXERCISE DUR SEC: 0 SEC
STRESS POST PEAK HR: 99 BPM
STRESS POST PEAK SYSTOLIC BP: 110 MMHG
TARGET HR FORMULA: NORMAL
TEST INDICATION: NORMAL

## 2024-12-24 ENCOUNTER — OFFICE VISIT (OUTPATIENT)
Dept: CARDIOLOGY CLINIC | Facility: CLINIC | Age: 79
End: 2024-12-24
Payer: COMMERCIAL

## 2024-12-24 ENCOUNTER — PREP FOR PROCEDURE (OUTPATIENT)
Dept: VASCULAR SURGERY | Facility: CLINIC | Age: 79
End: 2024-12-24

## 2024-12-24 VITALS
OXYGEN SATURATION: 97 % | HEART RATE: 67 BPM | DIASTOLIC BLOOD PRESSURE: 60 MMHG | HEIGHT: 62 IN | BODY MASS INDEX: 18.2 KG/M2 | SYSTOLIC BLOOD PRESSURE: 108 MMHG | WEIGHT: 98.9 LBS

## 2024-12-24 DIAGNOSIS — I71.9 PENETRATING ATHEROSCLEROTIC ULCER OF AORTA (HCC): Primary | ICD-10-CM

## 2024-12-24 DIAGNOSIS — I77.9 AORTA DISORDER (HCC): ICD-10-CM

## 2024-12-24 DIAGNOSIS — R29.6 FALLS FREQUENTLY: ICD-10-CM

## 2024-12-24 DIAGNOSIS — Z01.810 PREOP CARDIOVASCULAR EXAM: Primary | ICD-10-CM

## 2024-12-24 DIAGNOSIS — I71.9 PENETRATING ATHEROSCLEROTIC ULCER OF AORTA (HCC): ICD-10-CM

## 2024-12-24 DIAGNOSIS — Z72.0 TOBACCO ABUSE: ICD-10-CM

## 2024-12-24 DIAGNOSIS — I10 PRIMARY HYPERTENSION: ICD-10-CM

## 2024-12-24 PROCEDURE — 99214 OFFICE O/P EST MOD 30 MIN: CPT | Performed by: INTERNAL MEDICINE

## 2024-12-24 NOTE — PROGRESS NOTES
Cardiology Follow Up    Karli Maradiaga  1945  [id number]  [facility]  [address]  [place] [address] [phone]    1. Preop cardiovascular exam        2. Primary hypertension        3. Penetrating atherosclerotic ulcer of aorta (HCC)        4. Tobacco abuse        5. Falls frequently              Discussion/Summary: All of her assessed cardiac problems are stable. She is not having active anginal symptoms. Recent echo and nuclear stress test showed a normal EF with no ischemia.  I have reviewed her medications and made no changes. No further cardiac testing is needed.  RTO 9 months.  She is cleared for EVAR.        Interval History: She has not had any cardiac problems since her last office visit. She denies any recent dizziness or falls. She denies CP. She continues to get SOB at low levels of activity.  She is still smoking, now down to 5 - 6 cigs /day.    Wt 95 lbs    /60    ECHO 12/19/2024 - Ef 55%, mild to moderate MR, moderate TR    Nuclear stress test 12/2020 - no ischemia    Patient Active Problem List   Diagnosis    Hypertension    Anxiety    Hypothyroidism    Depression, recurrent (HCC)    Diarrhea    Hemorrhoids    SDH (subdural hematoma) (HCC)    Anxiety    Alcohol abuse    Neurologic gait dysfunction    Traumatic subdural hemorrhage with loss of consciousness of unspecified duration, initial encounter (HCC)    Syncope    Penetrating atherosclerotic ulcer of aorta (HCC)    Preop cardiovascular exam    Tobacco abuse    Falls frequently     Past Medical History:   Diagnosis Date    Anxiety     Last Assessed: 6/12/2017     COPD (chronic obstructive pulmonary disease) ([facility])     Depression     FH: colonic polyps     Glaucoma     Hypertension     Last Assessed: 12/12/2017    Hypothyroidism     Last Assessed: 6/12/2017    Menopause     Normal vaginal delivery     Osteopenia     Pap smear abnormality of cervix  with ASCUS favoring benign     Telangiectasis      Social History     Socioeconomic History    Marital status: /Civil Union     Spouse name: Not on file    Number of children: Not on file    Years of education: Not on file    Highest education level: Not on file   Occupational History    Not on file   Tobacco Use    Smoking status: Every Day     Current packs/day: 1.00     Average packs/day: 1 pack/day for 35.0 years (35.0 ttl pk-yrs)     Types: Cigarettes    Smokeless tobacco: Never   Vaping Use    Vaping status: Never Used   Substance and Sexual Activity    Alcohol use: Yes     Alcohol/week: 21.0 standard drinks of alcohol     Types: 21 Glasses of wine per week     Comment: no more than 3 glasses of wine per day    Drug use: No    Sexual activity: Yes   Other Topics Concern    Not on file   Social History Narrative    ** Merged History Encounter **         Drinks Coffee - 1-2 cup a day   Exercises Daily      Social Drivers of Health     Financial Resource Strain: Low Risk  (10/12/2023)    Overall Financial Resource Strain (CARDIA)     Difficulty of Paying Living Expenses: Not very hard   Food Insecurity: No Food Insecurity (2/14/2023)    Hunger Vital Sign     Worried About Running Out of Food in the Last Year: Never true     Ran Out of Food in the Last Year: Never true   Transportation Needs: No Transportation Needs (10/12/2023)    PRAPARE - Transportation     Lack of Transportation (Medical): No     Lack of Transportation (Non-Medical): No   Physical Activity: Not on file   Stress: Not on file   Social Connections: Not on file   Intimate Partner Violence: Not on file   Housing Stability: Low Risk  (2/14/2023)    Housing Stability Vital Sign     Unable to Pay for Housing in the Last Year: No     Number of Places Lived in the Last Year: 1     Unstable Housing in the Last Year: No      Family History   Problem Relation Age of Onset    Heart disease Mother         Cardiac Disorder     Colonic polyp Mother      "Hypertension Mother     Heart disease Father         Cardiac Dsiorder     Hypertension Father     Lung cancer Father     Breast cancer Sister 68    Stomach cancer Sister     Colonic polyp Brother     Lung cancer Brother     Hepatitis Daughter         Alcoholic    Arthritis Family     Lung cancer Family     No Known Problems Maternal Grandmother     No Known Problems Maternal Grandfather     No Known Problems Paternal Grandmother     No Known Problems Paternal Grandfather     No Known Problems Son     Esophageal cancer Sister 56     Past Surgical History:   Procedure Laterality Date    CATARACT EXTRACTION      COLONOSCOPY      complete    DILATION AND CURETTAGE OF UTERUS  01/10/1978    HEMORROIDECTOMY      x2    LAPAROSCOPY      Diagnostic     TUBAL LIGATION         Current Outpatient Medications:     acetaminophen (TYLENOL) 325 mg tablet, Take 2 tablets (650 mg total) by mouth every 6 (six) hours as needed for mild pain, Disp: , Rfl: 0    ALPRAZolam (XANAX) 1 mg tablet, Take 1 tablet (1 mg total) by mouth 2 (two) times a day as needed for anxiety, Disp: 60 tablet, Rfl: 3    aspirin 81 mg chewable tablet, Chew 1 tablet (81 mg total) daily, Disp: 90 tablet, Rfl: 3    atorvastatin (LIPITOR) 40 mg tablet, take 1 tablet by mouth once daily, Disp: 90 tablet, Rfl: 1    citalopram (CeleXA) 10 mg tablet, take 1 tablet by mouth once daily, Disp: 100 tablet, Rfl: 1    latanoprost (XALATAN) 0.005 % ophthalmic solution, instill 1 drop into both eyes every evening, Disp: , Rfl:     levothyroxine 50 mcg tablet, take 1 tablet by mouth once daily in THE EARLY MORNING, Disp: 100 tablet, Rfl: 0    metoprolol succinate (TOPROL-XL) 25 mg 24 hr tablet, take 1 tablet by mouth once daily, Disp: 90 tablet, Rfl: 1  No Known Allergies  Vitals:    12/24/24 0823   BP: 108/60   BP Location: Left arm   Patient Position: Sitting   Cuff Size: Standard   Pulse: 67   SpO2: 97%   Weight: 44.9 kg (98 lb 14.4 oz)   Height: 5' 2\" (1.575 m)     Weight " "(last 2 days)       Date/Time Weight    12/24/24 0823 44.9 (98.9)           Blood pressure 108/60, pulse 67, height 5' 2\" (1.575 m), weight 44.9 kg (98 lb 14.4 oz), SpO2 97%., Body mass index is 18.09 kg/m².    Labs:  Hospital Outpatient Visit on 12/19/2024   Component Date Value    Baseline HR 12/20/2024 69     Baseline BP 12/20/2024 110/60     O2 sat rest 12/20/2024 96     Stress peak HR 12/20/2024 90     Post peak BP 12/20/2024 108     O2 sat peak 12/20/2024 95     Recovery HR 12/20/2024 85     Recovery BP 12/20/2024 110/70     O2 sat recovery 12/20/2024 98     Max HR 12/20/2024 99     Rate Pressure Product 12/20/2024 9,720.0     Max HR Percent 12/20/2024 69     Estimated workload 12/20/2024 1.0     Angina Index 12/20/2024 0     Rest Nuclear Isotope Dose 12/20/2024 10.20     Stress Nuclear Isotope D* 12/20/2024 31.50     Stress/rest perfusion ra* 12/20/2024 0.99     EF (%) 12/20/2024 82     Protocol Name 12/20/2024 BRIDGETT SIT     Exercise duration (min) 12/20/2024 3     Exercise duration (sec) 12/20/2024 0     Post Peak Systolic BP 12/20/2024 110     Max Diastolic Bp 12/20/2024 70     Peak HR 12/20/2024 99     Max Predicted Heart Rate 12/20/2024 143     Reason for Termination 12/20/2024 Peripheral muscle fatigue     Test Indication 12/20/2024 preop , abnormal EKG     Target Hr Formular 12/20/2024 (220 - Age)*100%     Chest Pain Statement 12/20/2024 none    Hospital Outpatient Visit on 12/19/2024   Component Date Value    Triscuspid Valve Regurgi* 12/19/2024 35.0     RAA A4C 12/19/2024 15.7     LA Volume Index (BP) 12/19/2024 44.6     MV Peak A Fox 12/19/2024 0.33     MV stenosis pressure 1/2* 12/19/2024 59     MV Peak E Fox 12/19/2024 48     E wave deceleration time 12/19/2024 204     E/A ratio 12/19/2024 1.45     MV valve area p 1/2 meth* 12/19/2024 3.73     RA 2D Volume 12/19/2024 46.0     TR Peak Fox 12/19/2024 3.0     RVID d 12/19/2024 4.4     A4C EF 12/19/2024 58     Tricuspid valve peak reg* 12/19/2024 2.96  "    Left ventricular stroke * 12/19/2024 40.00     IVSd 12/19/2024 1.20     Tricuspid annular plane * 12/19/2024 1.90     Ao root 12/19/2024 2.60     LVPWd 12/19/2024 1.00     LA size 12/19/2024 3.6     Asc Ao 12/19/2024 3.4     LA volume (BP) 12/19/2024 62     FS 12/19/2024 26     LVIDS 12/19/2024 3.20     IVS 12/19/2024 1.2     LVIDd 12/19/2024 4.30     LA length (A2C) 12/19/2024 5.40     LEFT VENTRICLE SYSTOLIC * 12/19/2024 42     LV DIASTOLIC VOLUME (MOD* 12/19/2024 81     Left Atrium Area-systoli* 12/19/2024 24.3     Left Atrium Area-systoli* 12/19/2024 18.2     MV E' Tissue Velocity Se* 12/19/2024 6     LVSV, 2D 12/19/2024 40     BSA 12/19/2024 1.39     LV EF 12/19/2024 55     Est. RA pres 12/19/2024 3.0     Right Ventricular Peak S* 12/19/2024 38.00    Orders Only on 12/11/2024   Component Date Value    Total Cholesterol 12/11/2024 145     HDL 12/11/2024 57     Triglycerides 12/11/2024 77     LDL Calculated 12/11/2024 72     Chol HDLC Ratio 12/11/2024 2.5     Non-HDL Cholesterol 12/11/2024 88     SL AMB ANTIBODY SCREEN, * 12/11/2024 NO ANTIBODIES DETECTED     ABO Grouping 12/11/2024 AB     Rh Type 12/11/2024 RH(D) NEGATIVE    Orders Only on 07/25/2024   Component Date Value    Glucose, Random 07/25/2024 96     BUN 07/25/2024 8     Creatinine 07/25/2024 0.40 (L)     eGFR 07/25/2024 102     SL AMB BUN/CREATININE RA* 07/25/2024 20     Sodium 07/25/2024 140     Potassium 07/25/2024 3.9     Chloride 07/25/2024 104     CO2 07/25/2024 30     Calcium 07/25/2024 9.2     Protein, Total 07/25/2024 6.5     Albumin 07/25/2024 4.0     Globulin 07/25/2024 2.5     Albumin/Globulin Ratio 07/25/2024 1.6     TOTAL BILIRUBIN 07/25/2024 0.5     Alkaline Phosphatase 07/25/2024 52     AST 07/25/2024 12     ALT 07/25/2024 5 (L)     White Blood Cell Count 07/25/2024 7.3     Red Blood Cell Count 07/25/2024 3.62 (L)     Hemoglobin 07/25/2024 11.5 (L)     HCT 07/25/2024 34.4 (L)     MCV 07/25/2024 95.0     MCH 07/25/2024 31.8     MCHC  07/25/2024 33.4     RDW 07/25/2024 12.3     Platelet Count 07/25/2024 270     SL AMB MPV 07/25/2024 10.1     Neutrophils (Absolute) 07/25/2024 4,380     Lymphocytes (Absolute) 07/25/2024 2,066     Monocytes (Absolute) 07/25/2024 672     Eosinophils (Absolute) 07/25/2024 153     Basophils ABS 07/25/2024 29     Neutrophils 07/25/2024 60     Lymphocytes 07/25/2024 28.3     Monocytes 07/25/2024 9.2     Eosinophils 07/25/2024 2.1     Basophils PCT 07/25/2024 0.4     TSH 07/25/2024 0.08 (L)      Imaging: Stress strip  Result Date: 12/23/2024  Narrative: Confirmed by ANAHI ROSARIO ([id number]),  Angela Torres ([id number]) on 12/23/2024 8:37:54 AM    NM myocardial perfusion spect (rx stress and/or rest)  Result Date: 12/20/2024  Narrative:   Stress ECG: No ST deviation is noted. Arrhythmias during stress: rare PVCs. The ECG was not diagnostic due to pharmacological (vasodilator) stress.   Perfusion: There are no perfusion defects.   Stress Function: Left ventricular function post-stress is normal. Stress ejection fraction is 82%.   Stress Combined Conclusion: The ECG and SPECT imaging portions of the stress study are concordant with no evidence of stress induced myocardial ischemia. Left ventricular perfusion is normal. Normal NM SPECT MPI No ECG or SPECT evidence of infarct or ischemia with vasodilator stress     Echo complete w/ contrast if indicated  Result Date: 12/19/2024  Narrative:   Left Ventricle: Left ventricular cavity size is normal. Wall thickness is normal. The left ventricular ejection fraction is 55%. Systolic function is normal. Wall motion is normal. Diastolic function is moderately abnormal, consistent with grade II (pseudonormal) relaxation.   IVS: There is sigmoid appearance of the septum.   Right Ventricle: Right ventricular cavity size is dilated. Systolic function is normal.   Left Atrium: The atrium is moderately dilated.   Aortic Valve: There is aortic valve sclerosis.   Mitral Valve: There is  moderate diffuse thickening. 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 The anterior leaflet is prolapsed. There is systolic bowing of the anterior and posterior leaflets. There is mild to moderate regurgitation.   Tricuspid Valve: The tricuspid valve has moderate annular dilation. There is moderate regurgitation. The tricuspid valve regurgitation jet is eccentric.   Pulmonic Valve: There is mild regurgitation.   Pericardium: There is a small pericardial effusion anterior to the heart.       Review of Systems:  Review of Systems   Constitutional:  Negative for diaphoresis, fatigue, fever and unexpected weight change.   HENT: Negative.     Respiratory:  Positive for shortness of breath. Negative for cough and wheezing.    Cardiovascular:  Negative for chest pain, palpitations and leg swelling.   Gastrointestinal:  Negative for abdominal pain, diarrhea and nausea.   Musculoskeletal:  Negative for gait problem and myalgias.   Skin:  Negative for rash.   Neurological:  Negative for dizziness and numbness.   Psychiatric/Behavioral: Negative.         Physical Exam:  Physical Exam  Constitutional:       Appearance: She is well-developed.   HENT:      Head: Normocephalic and atraumatic.   Eyes:      Pupils: Pupils are equal, round, and reactive to light.   Neck:      Vascular: No JVD.   Cardiovascular:      Rate and Rhythm: Regular rhythm.      Pulses: Normal pulses.           Carotid pulses are 2+ on the right side and 2+ on the left side.     Heart sounds: S1 normal and S2 normal.   Pulmonary:      Effort: Pulmonary effort is normal.      Breath sounds: Normal breath sounds. No wheezing or rales.   Abdominal:      General: Bowel sounds are normal.      Palpations: Abdomen is soft.   Musculoskeletal:         General: No tenderness. Normal range of motion.      Cervical back: Normal range of motion and neck supple.   Skin:     General: Skin is warm.   Neurological:      Mental Status: She is alert and oriented to person, place,  and time.      Cranial Nerves: No cranial nerve deficit.      Deep Tendon Reflexes: Reflexes are normal and symmetric.

## 2024-12-30 ENCOUNTER — TELEPHONE (OUTPATIENT)
Dept: VASCULAR SURGERY | Facility: CLINIC | Age: 79
End: 2024-12-30

## 2024-12-30 ENCOUNTER — HOSPITAL ENCOUNTER (OUTPATIENT)
Dept: NON INVASIVE DIAGNOSTICS | Facility: CLINIC | Age: 79
Discharge: HOME | End: 2024-12-30
Payer: COMMERCIAL

## 2024-12-30 DIAGNOSIS — I71.9 PENETRATING ATHEROSCLEROTIC ULCER OF AORTA (HCC): ICD-10-CM

## 2024-12-30 PROCEDURE — 93923 UPR/LXTR ART STDY 3+ LVLS: CPT

## 2024-12-30 PROCEDURE — 93925 LOWER EXTREMITY STUDY: CPT

## 2024-12-30 PROCEDURE — 93925 LOWER EXTREMITY STUDY: CPT | Performed by: SURGERY

## 2024-12-30 PROCEDURE — 93922 UPR/L XTREMITY ART 2 LEVELS: CPT | Performed by: SURGERY

## 2025-01-07 ENCOUNTER — TELEPHONE (OUTPATIENT)
Dept: VASCULAR SURGERY | Facility: CLINIC | Age: 80
End: 2025-01-07

## 2025-01-15 ENCOUNTER — APPOINTMENT (OUTPATIENT)
Dept: LAB | Facility: CLINIC | Age: 80
End: 2025-01-15
Payer: COMMERCIAL

## 2025-01-15 ENCOUNTER — TRANSCRIBE ORDERS (OUTPATIENT)
Dept: LAB | Facility: CLINIC | Age: 80
End: 2025-01-15

## 2025-01-15 ENCOUNTER — LAB REQUISITION (OUTPATIENT)
Dept: LAB | Facility: HOSPITAL | Age: 80
End: 2025-01-15
Payer: COMMERCIAL

## 2025-01-15 DIAGNOSIS — R94.31 ABNORMAL ELECTROCARDIOGRAM (ECG) (EKG): ICD-10-CM

## 2025-01-15 DIAGNOSIS — I71.9 AORTIC ANEURYSM OF UNSPECIFIED SITE, WITHOUT RUPTURE (HCC): ICD-10-CM

## 2025-01-15 DIAGNOSIS — Z01.818 PRE-OP EVALUATION: ICD-10-CM

## 2025-01-15 DIAGNOSIS — I71.9 HYALINE NECROSIS OF AORTA (HCC): ICD-10-CM

## 2025-01-15 DIAGNOSIS — I10 HYPERTENSION, UNSPECIFIED TYPE: ICD-10-CM

## 2025-01-15 DIAGNOSIS — Z72.0 TOBACCO USE: ICD-10-CM

## 2025-01-15 DIAGNOSIS — I71.9 PENETRATING ATHEROSCLEROTIC ULCER OF AORTA (HCC): ICD-10-CM

## 2025-01-15 DIAGNOSIS — I77.9 AORTA DISORDER (HCC): ICD-10-CM

## 2025-01-15 DIAGNOSIS — E03.9 HYPOTHYROIDISM, UNSPECIFIED TYPE: ICD-10-CM

## 2025-01-15 LAB
ABO GROUP BLD: NORMAL
ANION GAP SERPL CALCULATED.3IONS-SCNC: 7 MMOL/L (ref 4–13)
BLD GP AB SCN SERPL QL: NEGATIVE
BUN SERPL-MCNC: 8 MG/DL (ref 5–25)
CALCIUM SERPL-MCNC: 9 MG/DL (ref 8.4–10.2)
CHLORIDE SERPL-SCNC: 99 MMOL/L (ref 96–108)
CHOLEST SERPL-MCNC: 132 MG/DL (ref ?–200)
CO2 SERPL-SCNC: 32 MMOL/L (ref 21–32)
CREAT SERPL-MCNC: 0.54 MG/DL (ref 0.6–1.3)
ERYTHROCYTE [DISTWIDTH] IN BLOOD BY AUTOMATED COUNT: 15.3 % (ref 11.6–15.1)
GFR SERPL CREATININE-BSD FRML MDRD: 91 ML/MIN/1.73SQ M
GLUCOSE SERPL-MCNC: 72 MG/DL (ref 65–140)
HCT VFR BLD AUTO: 37.3 % (ref 34.8–46.1)
HDLC SERPL-MCNC: 52 MG/DL
HGB BLD-MCNC: 12.2 G/DL (ref 11.5–15.4)
LDLC SERPL CALC-MCNC: 60 MG/DL (ref 0–100)
MCH RBC QN AUTO: 30.9 PG (ref 26.8–34.3)
MCHC RBC AUTO-ENTMCNC: 32.7 G/DL (ref 31.4–37.4)
MCV RBC AUTO: 94 FL (ref 82–98)
NONHDLC SERPL-MCNC: 80 MG/DL
PLATELET # BLD AUTO: 266 THOUSANDS/UL (ref 149–390)
PMV BLD AUTO: 10 FL (ref 8.9–12.7)
POTASSIUM SERPL-SCNC: 3.8 MMOL/L (ref 3.5–5.3)
RBC # BLD AUTO: 3.95 MILLION/UL (ref 3.81–5.12)
RH BLD: NEGATIVE
SODIUM SERPL-SCNC: 138 MMOL/L (ref 135–147)
SPECIMEN EXPIRATION DATE: NORMAL
TRIGL SERPL-MCNC: 98 MG/DL (ref ?–150)
WBC # BLD AUTO: 8.67 THOUSAND/UL (ref 4.31–10.16)

## 2025-01-15 PROCEDURE — 36415 COLL VENOUS BLD VENIPUNCTURE: CPT

## 2025-01-15 PROCEDURE — 86901 BLOOD TYPING SEROLOGIC RH(D): CPT | Performed by: SURGERY

## 2025-01-15 PROCEDURE — 85027 COMPLETE CBC AUTOMATED: CPT

## 2025-01-15 PROCEDURE — 86900 BLOOD TYPING SEROLOGIC ABO: CPT | Performed by: SURGERY

## 2025-01-15 PROCEDURE — 80061 LIPID PANEL: CPT

## 2025-01-15 PROCEDURE — 86850 RBC ANTIBODY SCREEN: CPT | Performed by: SURGERY

## 2025-01-15 PROCEDURE — 80048 BASIC METABOLIC PNL TOTAL CA: CPT

## 2025-01-16 DIAGNOSIS — F41.9 ANXIETY: ICD-10-CM

## 2025-01-16 RX ORDER — CITALOPRAM HYDROBROMIDE 10 MG/1
10 TABLET ORAL DAILY
Qty: 100 TABLET | Refills: 1 | Status: SHIPPED | OUTPATIENT
Start: 2025-01-16

## 2025-01-20 ENCOUNTER — ANESTHESIA EVENT (OUTPATIENT)
Dept: PERIOP | Facility: HOSPITAL | Age: 80
DRG: 269 | End: 2025-01-20
Payer: COMMERCIAL

## 2025-01-21 NOTE — PRE-PROCEDURE INSTRUCTIONS
Pre-Surgery Instructions:   Medication Instructions    acetaminophen (TYLENOL) 325 mg tablet Uses PRN- OK to take day of surgery    ALPRAZolam (XANAX) 1 mg tablet Uses PRN- OK to take day of surgery    aspirin 81 mg chewable tablet Take day of surgery.    atorvastatin (LIPITOR) 40 mg tablet Take day of surgery.    citalopram (CeleXA) 10 mg tablet Take day of surgery.    cyanocobalamin (VITAMIN B-12) 100 MCG tablet Stop taking 7 days prior to surgery.    latanoprost (XALATAN) 0.005 % ophthalmic solution Take day of surgery.    levothyroxine 50 mcg tablet Take day of surgery.    metoprolol succinate (TOPROL-XL) 25 mg 24 hr tablet Take day of surgery.   Medication instructions for day surgery reviewed. Please use only a sip of water to take your instructed medications. Avoid all over the counter vitamins, supplements and NSAIDS for one week prior to surgery per anesthesia guidelines. Tylenol is ok to take as needed.     You will receive a call one business day prior to surgery with an arrival time and hospital directions. If your surgery is scheduled on a Monday, the hospital will be calling you on the Friday prior to your surgery. If you have not heard from anyone by 8pm, please call the hospital supervisor through the hospital  at [phone]. ([place] [phone] or [place] [phone]).    Do not eat or drink anything after midnight the night before your surgery, including candy, mints, lifesavers, or chewing gum. Do not drink alcohol 24hrs before your surgery. Try not to smoke at least 24hrs before your surgery.       Follow the pre surgery showering instructions as listed in the “My Surgical Experience Booklet” or otherwise provided by your surgeon's office. Do not use a blade to shave the surgical area 1 week before surgery. It is okay to use a clean electric clippers up to 24 hours before surgery. Do not apply any lotions, creams, including makeup, cologne, deodorant, or perfumes after showering on  "the day of your surgery. Do not use dry shampoo, hair spray, hair gel, or any type of hair products.     No contact lenses, eye make-up, or artificial eyelashes. Remove nail polish, including gel polish, and any artificial, gel, or acrylic nails if possible. Remove all jewelry including rings and body piercing jewelry.     Wear causal clothing that is easy to take on and off. Consider your type of surgery.    Keep any valuables, jewelry, piercings at home. Please bring any specially ordered equipment (sling, braces) if indicated.    Arrange for a responsible person to drive you to and from the hospital on the day of your surgery. Please confirm the visitor policy for the day of your procedure when you receive your phone call with an arrival time.     Call the surgeon's office with any new illnesses, exposures, or additional questions prior to surgery.    Please reference your “My Surgical Experience Booklet” for additional information to prepare for your upcoming surgery.     See Geriatric Assessment below...  Cognitive Assessment:   CAM:   TUG <15 sec:  Falls (last 6 months): Falls frequently-could not estimate a number   Hand  score:  -Attempt 1:  -Attempt 2:  -Attempt 3:  Francisco J Total Score: 21  PHQ- 9 Depression Scale: 1  Nutrition Assessment Score: 9  METS: 6.79  Incentive Spirometry Level:   Health goals:  -What are your overall health goals? (quit smoking, wt. loss, rest, decrease stress)  \"To be able to do normal things and remain independent\"  -What brings you strength? (family, friends, [facility], health)  \"My \"  -What activities are important to you? (exercise, reading, travel, work)  \"I like to play games on my tablet, watch TV, and do things around the house.\"      "

## 2025-01-25 PROBLEM — S06.5XAA SDH (SUBDURAL HEMATOMA) (HCC): Status: RESOLVED | Noted: 2023-02-12 | Resolved: 2025-01-25

## 2025-01-25 PROBLEM — S06.5X9A TRAUMATIC SUBDURAL HEMORRHAGE WITH LOSS OF CONSCIOUSNESS OF UNSPECIFIED DURATION, INITIAL ENCOUNTER (HCC): Status: RESOLVED | Noted: 2024-04-30 | Resolved: 2025-01-25

## 2025-01-25 NOTE — ANESTHESIA PREPROCEDURE EVALUATION
Procedure:  EVAR, bilateral percutaneous femoral access (Bilateral: Abdomen)    Relevant Problems   ANESTHESIA (within normal limits)   (-) History of anesthesia complications      CARDIO   (+) Hypertension   (+) Penetrating atherosclerotic ulcer of aorta (HCC)      ENDO   (+) Hypothyroidism      GI/HEPATIC (within normal limits)      /RENAL (within normal limits)      HEMATOLOGY (within normal limits)      MUSCULOSKELETAL (within normal limits)      NEURO/PSYCH   (+) Anxiety   (+) Depression, recurrent (HCC)      PULMONARY (within normal limits)      Behavioral Health   (+) Tobacco abuse      Care Coordination   (+) Falls frequently   (+) Neurologic gait dysfunction        Physical Exam    Airway    Mallampati score: I  TM Distance: >3 FB  Neck ROM: full     Dental    upper dentures and lower dentures    Cardiovascular  Rhythm: regular, Rate: normal, Cardiovascular exam normal    Pulmonary  Pulmonary exam normal Breath sounds clear to auscultation    Other Findings  post-pubertal.      Anesthesia Plan  ASA Score- 3     Anesthesia Type- general with ASA Monitors.         Additional Monitors: arterial line.    Airway Plan: ETT.           Plan Factors-Exercise tolerance (METS): >4 METS.    Chart reviewed. EKG reviewed.  Existing labs reviewed. Patient summary reviewed.    Patient is a current smoker.  Patient instructed to abstain from smoking on day of procedure. Patient did not smoke on day of surgery.            Induction- intravenous.    Postoperative Plan- . Planned trial extubation    Perioperative Resuscitation Plan - Level 1 - Full Code.       Informed Consent- Anesthetic plan and risks discussed with patient.  I personally reviewed this patient with the CRNA. Discussed and agreed on the Anesthesia Plan with the CRNA..      NPO Status:  No vitals data found for the desired time range.    Normal stress test 12/20/24.    Echo (12/19/24):    Left Ventricle: Left ventricular cavity size is normal. Wall thickness  is normal. The left ventricular ejection fraction is 55%. Systolic function is normal. Wall motion is normal. Diastolic function is moderately abnormal, consistent with grade II (pseudonormal) relaxation.    IVS: There is sigmoid appearance of the septum.    Right Ventricle: Right ventricular cavity size is dilated. Systolic function is normal.    Left Atrium: The atrium is moderately dilated.    Aortic Valve: There is aortic valve sclerosis.    Mitral Valve: There is moderate diffuse thickening. 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 The anterior leaflet is prolapsed. There is systolic bowing of the anterior and posterior leaflets. There is mild to moderate regurgitation.    Tricuspid Valve: The tricuspid valve has moderate annular dilation. There is moderate regurgitation. The tricuspid valve regurgitation jet is eccentric.    Pulmonic Valve: There is mild regurgitation.    Pericardium: There is a small pericardial effusion anterior to the heart.      NPO verified.  NKDA.  Patient took levothyroxine, metoprolol, and citalopram this morning, 1/27/25.    Plan:  GETA, arterial line    Risks and benefits of general anesthesia were discussed with the patient.  Discussed risks of anesthesia including, but not limited to, the risk of dental injury, n/v, sore throat, corneal abrasions, and arrhythmias.  All questions were answered.  Anesthesia consent was obtained from the patient.

## 2025-01-27 ENCOUNTER — HOSPITAL ENCOUNTER (INPATIENT)
Facility: HOSPITAL | Age: 80
LOS: 1 days | Discharge: HOME | DRG: 269 | End: 2025-01-28
Attending: SURGERY | Admitting: SURGERY
Payer: COMMERCIAL

## 2025-01-27 ENCOUNTER — ANESTHESIA (OUTPATIENT)
Dept: PERIOP | Facility: HOSPITAL | Age: 80
DRG: 269 | End: 2025-01-27
Payer: COMMERCIAL

## 2025-01-27 ENCOUNTER — APPOINTMENT (OUTPATIENT)
Dept: RADIOLOGY | Facility: HOSPITAL | Age: 80
DRG: 269 | End: 2025-01-27
Payer: COMMERCIAL

## 2025-01-27 DIAGNOSIS — Z86.79 HISTORY OF ABDOMINAL AORTIC ANEURYSM (AAA): ICD-10-CM

## 2025-01-27 DIAGNOSIS — Z01.89 ENCOUNTER FOR GERIATRIC ASSESSMENT: Primary | ICD-10-CM

## 2025-01-27 LAB
ANION GAP SERPL CALCULATED.3IONS-SCNC: 6 MMOL/L (ref 4–13)
BUN SERPL-MCNC: 11 MG/DL (ref 5–25)
CALCIUM SERPL-MCNC: 9.5 MG/DL (ref 8.4–10.2)
CHLORIDE SERPL-SCNC: 101 MMOL/L (ref 96–108)
CO2 SERPL-SCNC: 33 MMOL/L (ref 21–32)
CREAT SERPL-MCNC: 0.63 MG/DL (ref 0.6–1.3)
ERYTHROCYTE [DISTWIDTH] IN BLOOD BY AUTOMATED COUNT: 15.4 % (ref 11.6–15.1)
GFR SERPL CREATININE-BSD FRML MDRD: 86 ML/MIN/1.73SQ M
GLUCOSE P FAST SERPL-MCNC: 91 MG/DL (ref 65–99)
GLUCOSE SERPL-MCNC: 91 MG/DL (ref 65–140)
HCT VFR BLD AUTO: 39.1 % (ref 34.8–46.1)
HGB BLD-MCNC: 12.4 G/DL (ref 11.5–15.4)
MCH RBC QN AUTO: 30.3 PG (ref 26.8–34.3)
MCHC RBC AUTO-ENTMCNC: 31.7 G/DL (ref 31.4–37.4)
MCV RBC AUTO: 96 FL (ref 82–98)
PLATELET # BLD AUTO: 231 THOUSANDS/UL (ref 149–390)
PMV BLD AUTO: 9.6 FL (ref 8.9–12.7)
POTASSIUM SERPL-SCNC: 3.9 MMOL/L (ref 3.5–5.3)
RBC # BLD AUTO: 4.09 MILLION/UL (ref 3.81–5.12)
SODIUM SERPL-SCNC: 140 MMOL/L (ref 135–147)
WBC # BLD AUTO: 6.85 THOUSAND/UL (ref 4.31–10.16)

## 2025-01-27 PROCEDURE — C1892 INTRO/SHEATH,FIXED,PEEL-AWAY: HCPCS | Performed by: SURGERY

## 2025-01-27 PROCEDURE — C1894 INTRO/SHEATH, NON-LASER: HCPCS | Performed by: SURGERY

## 2025-01-27 PROCEDURE — 80048 BASIC METABOLIC PNL TOTAL CA: CPT | Performed by: SURGERY

## 2025-01-27 PROCEDURE — C1773 RET DEV, INSERTABLE: HCPCS | Performed by: SURGERY

## 2025-01-27 PROCEDURE — 34713 PERQ ACCESS & CLSR FEM ART: CPT | Performed by: SURGERY

## 2025-01-27 PROCEDURE — NC001 PR NO CHARGE: Performed by: SURGERY

## 2025-01-27 PROCEDURE — 34705 EVAC RPR A-BIILIAC NDGFT: CPT | Performed by: SURGERY

## 2025-01-27 PROCEDURE — C1769 GUIDE WIRE: HCPCS | Performed by: SURGERY

## 2025-01-27 PROCEDURE — 76937 US GUIDE VASCULAR ACCESS: CPT

## 2025-01-27 PROCEDURE — C1887 CATHETER, GUIDING: HCPCS | Performed by: SURGERY

## 2025-01-27 PROCEDURE — 85027 COMPLETE CBC AUTOMATED: CPT | Performed by: SURGERY

## 2025-01-27 PROCEDURE — 04V03DZ RESTRICTION OF ABDOMINAL AORTA WITH INTRALUMINAL DEVICE, PERCUTANEOUS APPROACH: ICD-10-PCS | Performed by: SURGERY

## 2025-01-27 PROCEDURE — C2628 CATHETER, OCCLUSION: HCPCS | Performed by: SURGERY

## 2025-01-27 PROCEDURE — C1760 CLOSURE DEV, VASC: HCPCS | Performed by: SURGERY

## 2025-01-27 PROCEDURE — C1725 CATH, TRANSLUMIN NON-LASER: HCPCS | Performed by: SURGERY

## 2025-01-27 DEVICE — PERCLOSE™ PROSTYLE™ SUTURE-MEDIATED CLOSURE AND REPAIR SYSTEM
Type: IMPLANTABLE DEVICE | Site: GROIN | Status: FUNCTIONAL
Brand: PERCLOSE™ PROSTYLE™

## 2025-01-27 DEVICE — IMPLANTABLE DEVICE
Type: IMPLANTABLE DEVICE | Site: AORTA | Status: FUNCTIONAL
Brand: AFX2 BIFURCATED ENDOGRAFT SYSTEM

## 2025-01-27 RX ORDER — CITALOPRAM HYDROBROMIDE 10 MG/1
10 TABLET ORAL DAILY
Status: DISCONTINUED | OUTPATIENT
Start: 2025-01-28 | End: 2025-01-28 | Stop reason: HOSPADM

## 2025-01-27 RX ORDER — SODIUM CHLORIDE 9 MG/ML
INJECTION, SOLUTION INTRAVENOUS CONTINUOUS PRN
Status: DISCONTINUED | OUTPATIENT
Start: 2025-01-27 | End: 2025-01-27

## 2025-01-27 RX ORDER — OXYCODONE HYDROCHLORIDE 5 MG/1
2.5 TABLET ORAL EVERY 4 HOURS PRN
Refills: 0 | Status: DISCONTINUED | OUTPATIENT
Start: 2025-01-27 | End: 2025-01-28 | Stop reason: HOSPADM

## 2025-01-27 RX ORDER — ESMOLOL HYDROCHLORIDE 10 MG/ML
INJECTION INTRAVENOUS AS NEEDED
Status: DISCONTINUED | OUTPATIENT
Start: 2025-01-27 | End: 2025-01-27

## 2025-01-27 RX ORDER — FENTANYL CITRATE/PF 50 MCG/ML
25 SYRINGE (ML) INJECTION
Refills: 0 | Status: DISCONTINUED | OUTPATIENT
Start: 2025-01-27 | End: 2025-01-27 | Stop reason: HOSPADM

## 2025-01-27 RX ORDER — PROPOFOL 10 MG/ML
INJECTION, EMULSION INTRAVENOUS AS NEEDED
Status: DISCONTINUED | OUTPATIENT
Start: 2025-01-27 | End: 2025-01-27

## 2025-01-27 RX ORDER — METOPROLOL SUCCINATE 25 MG/1
25 TABLET, EXTENDED RELEASE ORAL DAILY
Status: DISCONTINUED | OUTPATIENT
Start: 2025-01-28 | End: 2025-01-28 | Stop reason: HOSPADM

## 2025-01-27 RX ORDER — ASPIRIN 81 MG/1
81 TABLET, CHEWABLE ORAL DAILY
Status: DISCONTINUED | OUTPATIENT
Start: 2025-01-28 | End: 2025-01-28 | Stop reason: HOSPADM

## 2025-01-27 RX ORDER — ONDANSETRON 2 MG/ML
INJECTION INTRAMUSCULAR; INTRAVENOUS AS NEEDED
Status: DISCONTINUED | OUTPATIENT
Start: 2025-01-27 | End: 2025-01-27

## 2025-01-27 RX ORDER — HEPARIN SODIUM 5000 [USP'U]/ML
5000 INJECTION, SOLUTION INTRAVENOUS; SUBCUTANEOUS EVERY 8 HOURS SCHEDULED
Status: DISCONTINUED | OUTPATIENT
Start: 2025-01-27 | End: 2025-01-28 | Stop reason: HOSPADM

## 2025-01-27 RX ORDER — IODIXANOL 320 MG/ML
INJECTION, SOLUTION INTRAVASCULAR AS NEEDED
Status: DISCONTINUED | OUTPATIENT
Start: 2025-01-27 | End: 2025-01-27 | Stop reason: HOSPADM

## 2025-01-27 RX ORDER — ONDANSETRON 2 MG/ML
4 INJECTION INTRAMUSCULAR; INTRAVENOUS EVERY 6 HOURS PRN
Status: DISCONTINUED | OUTPATIENT
Start: 2025-01-27 | End: 2025-01-28 | Stop reason: HOSPADM

## 2025-01-27 RX ORDER — LIDOCAINE HYDROCHLORIDE 10 MG/ML
INJECTION, SOLUTION EPIDURAL; INFILTRATION; INTRACAUDAL; PERINEURAL AS NEEDED
Status: DISCONTINUED | OUTPATIENT
Start: 2025-01-27 | End: 2025-01-27

## 2025-01-27 RX ORDER — SENNOSIDES 8.6 MG
1 TABLET ORAL DAILY
Status: DISCONTINUED | OUTPATIENT
Start: 2025-01-27 | End: 2025-01-28 | Stop reason: HOSPADM

## 2025-01-27 RX ORDER — HEPARIN SODIUM 1000 [USP'U]/ML
INJECTION, SOLUTION INTRAVENOUS; SUBCUTANEOUS AS NEEDED
Status: DISCONTINUED | OUTPATIENT
Start: 2025-01-27 | End: 2025-01-27

## 2025-01-27 RX ORDER — FENTANYL CITRATE 50 UG/ML
INJECTION, SOLUTION INTRAMUSCULAR; INTRAVENOUS AS NEEDED
Status: DISCONTINUED | OUTPATIENT
Start: 2025-01-27 | End: 2025-01-27

## 2025-01-27 RX ORDER — HEPARIN SODIUM 200 [USP'U]/100ML
INJECTION, SOLUTION INTRAVENOUS
Status: COMPLETED | OUTPATIENT
Start: 2025-01-27 | End: 2025-01-27

## 2025-01-27 RX ORDER — PHENYLEPHRINE HCL IN 0.9% NACL 1 MG/10 ML
SYRINGE (ML) INTRAVENOUS AS NEEDED
Status: DISCONTINUED | OUTPATIENT
Start: 2025-01-27 | End: 2025-01-27

## 2025-01-27 RX ORDER — ONDANSETRON 2 MG/ML
4 INJECTION INTRAMUSCULAR; INTRAVENOUS ONCE AS NEEDED
Status: DISCONTINUED | OUTPATIENT
Start: 2025-01-27 | End: 2025-01-27 | Stop reason: HOSPADM

## 2025-01-27 RX ORDER — CEFAZOLIN SODIUM 2 G/50ML
2000 SOLUTION INTRAVENOUS ONCE
Status: COMPLETED | OUTPATIENT
Start: 2025-01-27 | End: 2025-01-27

## 2025-01-27 RX ORDER — ACETAMINOPHEN 325 MG/1
975 TABLET ORAL EVERY 8 HOURS SCHEDULED
Status: DISCONTINUED | OUTPATIENT
Start: 2025-01-27 | End: 2025-01-28 | Stop reason: HOSPADM

## 2025-01-27 RX ORDER — LIDOCAINE HYDROCHLORIDE 10 MG/ML
0.5 INJECTION, SOLUTION EPIDURAL; INFILTRATION; INTRACAUDAL; PERINEURAL ONCE AS NEEDED
Status: DISCONTINUED | OUTPATIENT
Start: 2025-01-27 | End: 2025-01-27 | Stop reason: HOSPADM

## 2025-01-27 RX ORDER — PROTAMINE SULFATE 10 MG/ML
INJECTION, SOLUTION INTRAVENOUS AS NEEDED
Status: DISCONTINUED | OUTPATIENT
Start: 2025-01-27 | End: 2025-01-27

## 2025-01-27 RX ORDER — SODIUM CHLORIDE, SODIUM LACTATE, POTASSIUM CHLORIDE, CALCIUM CHLORIDE 600; 310; 30; 20 MG/100ML; MG/100ML; MG/100ML; MG/100ML
100 INJECTION, SOLUTION INTRAVENOUS CONTINUOUS
Status: DISCONTINUED | OUTPATIENT
Start: 2025-01-27 | End: 2025-01-27

## 2025-01-27 RX ORDER — ATORVASTATIN CALCIUM 40 MG/1
40 TABLET, FILM COATED ORAL DAILY
Status: DISCONTINUED | OUTPATIENT
Start: 2025-01-28 | End: 2025-01-28 | Stop reason: HOSPADM

## 2025-01-27 RX ORDER — CHLORHEXIDINE GLUCONATE ORAL RINSE 1.2 MG/ML
15 SOLUTION DENTAL ONCE
Status: COMPLETED | OUTPATIENT
Start: 2025-01-27 | End: 2025-01-27

## 2025-01-27 RX ORDER — GLYCOPYRROLATE 0.2 MG/ML
INJECTION INTRAMUSCULAR; INTRAVENOUS AS NEEDED
Status: DISCONTINUED | OUTPATIENT
Start: 2025-01-27 | End: 2025-01-27

## 2025-01-27 RX ORDER — ROCURONIUM BROMIDE 10 MG/ML
INJECTION, SOLUTION INTRAVENOUS AS NEEDED
Status: DISCONTINUED | OUTPATIENT
Start: 2025-01-27 | End: 2025-01-27

## 2025-01-27 RX ORDER — OXYCODONE HYDROCHLORIDE 5 MG/1
5 TABLET ORAL EVERY 4 HOURS PRN
Refills: 0 | Status: DISCONTINUED | OUTPATIENT
Start: 2025-01-27 | End: 2025-01-28 | Stop reason: HOSPADM

## 2025-01-27 RX ORDER — EPHEDRINE SULFATE 50 MG/ML
INJECTION INTRAVENOUS AS NEEDED
Status: DISCONTINUED | OUTPATIENT
Start: 2025-01-27 | End: 2025-01-27

## 2025-01-27 RX ORDER — LEVOTHYROXINE SODIUM 50 UG/1
50 TABLET ORAL
Status: DISCONTINUED | OUTPATIENT
Start: 2025-01-28 | End: 2025-01-28 | Stop reason: HOSPADM

## 2025-01-27 RX ORDER — LATANOPROST 50 UG/ML
1 SOLUTION/ DROPS OPHTHALMIC
Status: DISCONTINUED | OUTPATIENT
Start: 2025-01-27 | End: 2025-01-28 | Stop reason: HOSPADM

## 2025-01-27 RX ADMIN — FENTANYL CITRATE 25 MCG: 50 INJECTION INTRAMUSCULAR; INTRAVENOUS at 10:56

## 2025-01-27 RX ADMIN — PROTAMINE SULFATE 10 MG: 10 INJECTION, SOLUTION INTRAVENOUS at 12:41

## 2025-01-27 RX ADMIN — Medication 100 MCG: at 11:15

## 2025-01-27 RX ADMIN — Medication 100 MCG: at 11:32

## 2025-01-27 RX ADMIN — ONDANSETRON 4 MG: 2 INJECTION INTRAMUSCULAR; INTRAVENOUS at 11:24

## 2025-01-27 RX ADMIN — EPHEDRINE SULFATE 10 MG: 50 INJECTION INTRAVENOUS at 11:54

## 2025-01-27 RX ADMIN — SUGAMMADEX 50 MG: 100 INJECTION, SOLUTION INTRAVENOUS at 12:54

## 2025-01-27 RX ADMIN — CHLORHEXIDINE GLUCONATE 15 ML: 1.2 SOLUTION ORAL at 09:19

## 2025-01-27 RX ADMIN — STANDARDIZED SENNA CONCENTRATE 8.6 MG: 8.6 TABLET ORAL at 17:33

## 2025-01-27 RX ADMIN — HEPARIN SODIUM 5000 UNITS: 1000 INJECTION INTRAVENOUS; SUBCUTANEOUS at 11:54

## 2025-01-27 RX ADMIN — PROTAMINE SULFATE 10 MG: 10 INJECTION, SOLUTION INTRAVENOUS at 12:40

## 2025-01-27 RX ADMIN — NICARDIPINE HYDROCHLORIDE 100 MCG: 25 INJECTION, SOLUTION INTRAVENOUS at 12:23

## 2025-01-27 RX ADMIN — SODIUM CHLORIDE, SODIUM LACTATE, POTASSIUM CHLORIDE, AND CALCIUM CHLORIDE 100 ML/HR: .6; .31; .03; .02 INJECTION, SOLUTION INTRAVENOUS at 09:30

## 2025-01-27 RX ADMIN — EPHEDRINE SULFATE 10 MG: 50 INJECTION INTRAVENOUS at 12:59

## 2025-01-27 RX ADMIN — PROPOFOL 30 MG: 10 INJECTION, EMULSION INTRAVENOUS at 12:03

## 2025-01-27 RX ADMIN — PROPOFOL 50 MG: 10 INJECTION, EMULSION INTRAVENOUS at 12:05

## 2025-01-27 RX ADMIN — SUGAMMADEX 100 MG: 100 INJECTION, SOLUTION INTRAVENOUS at 12:51

## 2025-01-27 RX ADMIN — EPHEDRINE SULFATE 10 MG: 50 INJECTION INTRAVENOUS at 11:34

## 2025-01-27 RX ADMIN — Medication 100 MCG: at 12:12

## 2025-01-27 RX ADMIN — CEFAZOLIN SODIUM 2000 MG: 2 SOLUTION INTRAVENOUS at 11:24

## 2025-01-27 RX ADMIN — EPHEDRINE SULFATE 5 MG: 50 INJECTION INTRAVENOUS at 12:13

## 2025-01-27 RX ADMIN — ACETAMINOPHEN 975 MG: 325 TABLET, FILM COATED ORAL at 17:33

## 2025-01-27 RX ADMIN — LATANOPROST 1 DROP: 50 SOLUTION OPHTHALMIC at 21:11

## 2025-01-27 RX ADMIN — Medication 200 MCG: at 11:10

## 2025-01-27 RX ADMIN — LIDOCAINE HYDROCHLORIDE 50 MG: 10 INJECTION, SOLUTION EPIDURAL; INFILTRATION; INTRACAUDAL; PERINEURAL at 10:56

## 2025-01-27 RX ADMIN — PHENYLEPHRINE HYDROCHLORIDE 30 MCG/MIN: 10 INJECTION INTRAVENOUS at 11:30

## 2025-01-27 RX ADMIN — PROPOFOL 100 MG: 10 INJECTION, EMULSION INTRAVENOUS at 10:56

## 2025-01-27 RX ADMIN — ROCURONIUM 50 MG: 50 INJECTION, SOLUTION INTRAVENOUS at 10:57

## 2025-01-27 RX ADMIN — ESMOLOL HYDROCHLORIDE 20 MG: 100 INJECTION, SOLUTION INTRAVENOUS at 12:21

## 2025-01-27 RX ADMIN — GLYCOPYRROLATE 0.2 MG: 0.2 INJECTION, SOLUTION INTRAMUSCULAR; INTRAVENOUS at 11:31

## 2025-01-27 RX ADMIN — SODIUM CHLORIDE: 0.9 INJECTION, SOLUTION INTRAVENOUS at 11:14

## 2025-01-27 RX ADMIN — HEPARIN SODIUM 5000 UNITS: 5000 INJECTION, SOLUTION INTRAVENOUS; SUBCUTANEOUS at 21:11

## 2025-01-27 RX ADMIN — ESMOLOL HYDROCHLORIDE 20 MG: 100 INJECTION, SOLUTION INTRAVENOUS at 12:23

## 2025-01-27 NOTE — H&P
H&P - Vascular Surgery   Name: Karli Maradiaga 79 y.o. female I MRN: [id number]  Unit/Bed#: OR POOL I Date of Admission: 1/27/2025   Date of Service: 1/27/2025 I Hospital Day: 0     Assessment & Plan    Infra-renal BROCK  EVAR today    History of Present Illness   Karli Maradiaga is a 79 y.o. female no changes since last visit.    Review of Systems  I have reviewed the patient's PMH, PSH, Social History, Family History, Meds, and Allergies    Objective :  Temp:  [96.6 °F (35.9 °C)] 96.6 °F (35.9 °C)  HR:  [61] 61  BP: (132)/(76) 132/76  Resp:  [20] 20  SpO2:  [95 %] 95 %  O2 Device: None (Room air)    I/O       None            Physical Exam   CTAB  RRR  Ab soft, nt  LE warm      Lab Results: I have reviewed the following results:  Recent Labs     01/27/25  0909   WBC 6.85   HGB 12.4   HCT 39.1      SODIUM 140   K 3.9      CO2 33*   BUN 11   CREATININE 0.63   GLUC 91             VTE Prophylaxis:

## 2025-01-27 NOTE — QUICK NOTE
"Post-Op Check - Vascular Surgery   Karli Maradiaga 79 y.o. female MRN: [id number]  Unit/Bed#: [facility] 529-01 Encounter: [id number]    Assessment:  79yoF s/p EVAR in setting of BROCK (Bilateral femoral access with bilateral proglide closure)    Plan:  - Incentive spirometry  - Diet as tolerated  - PRN analgesia, anti-emetics  - Maintain A-Line for close hemodynamic monitoring  - I/Os; Solis in place  - Subcutaneous heparin for VTE Prophylaxis  - continue ASA/Statin    Subjective: Patient seen and examined at bedside, hemodynamically stable, endorsing back pain, exacerbated by supine positioning, chronic back pain at home. Denies abdominal pain, chest pain, shortness of breath. Bilateral LE M/S intact. Good appetite.    Vitals:  /53   Pulse 61   Temp 98 °F (36.7 °C) (Oral)   Resp 17   Ht 5' 2\" (1.575 m)   Wt 44.9 kg (99 lb)   SpO2 94%   BMI 18.11 kg/m²     Physical Exam:    General appearance: alert and oriented, in no acute distress  Neurologic: Grossly neurologically intact  Neck: supple, symmetrical, trachea midline  Lungs:  Normal work of breathing, no accessory muscle use  Heart:  Regular rate and rhythm  Abdomen:  Soft, non-tender, non-distended abdomen. Bilateral groins with c/d/I access sites. Soft without noted hematoma  Extremities:  Bilateral UE and LE warm and dry, Bilateral LE M/S intact.     Pulse exam:  Radial: Right: deferred with Radial A-Line  in placeLeft: 2+  Femoral: Right: 2+ Left: 2+  DP: Right: 2+ Left: 2+  PT: Right: 2+ Left: multiphasic doppler signal    I/Os:  No intake/output data recorded.  I/O this shift:  In: 1400 [I.V.:1400]  Out: 775 [Urine:725; Blood:50]    Invasive Lines/Tubes:  Invasive Devices       Peripheral Intravenous Line  Duration             Peripheral IV 01/27/25 Left Arm <1 day    Peripheral IV 01/27/25 Left;Ventral (anterior) Forearm <1 day    Peripheral IV 01/27/25 Right;Ventral (anterior) Forearm <1 day              Arterial Line  Duration             Arterial Line " 01/27/25 Right Radial <1 day              Drain  Duration             Urethral Catheter Straight-tip 16 Fr. <1 day                    VTE Prophylaxis: Heparin    Deena Bergman PA-C  1/27/2025

## 2025-01-27 NOTE — ANESTHESIA POSTPROCEDURE EVALUATION
Post-Op Assessment Note    CV Status:  Stable    Pain management: adequate       Mental Status:  Alert and awake   Hydration Status:  Euvolemic and stable   PONV Controlled:  None   Airway Patency:  Patent  Two or more mitigation strategies used for obstructive sleep apnea   Post Op Vitals Reviewed: Yes    No anethesia notable event occurred.    Staff: Anesthesiologist, CRNA           Last Filed PACU Vitals:  Vitals Value Taken Time   Temp 98.9 °F (37.2 °C) 01/27/25 1307   Pulse 63 01/27/25 1343   /59 01/27/25 1330   Resp 18 01/27/25 1343   SpO2 100 % 01/27/25 1343   Vitals shown include unfiled device data.    Modified Won:     Vitals Value Taken Time   Activity 2 01/27/25 1330   Respiration 2 01/27/25 1330   Circulation 2 01/27/25 1330   Consciousness 2 01/27/25 1330   Oxygen Saturation 1 01/27/25 1330     Modified Won Score: 9             Drysol Counseling:  I discussed with the patient the risks of drysol/aluminum chloride including but not limited to skin rash, itching, irritation, burning.

## 2025-01-27 NOTE — ANESTHESIA PROCEDURE NOTES
Arterial Line Insertion    Performed by: Mal Harris MD  Authorized by: Mal Harris MD  Consent: Verbal consent obtained. Written consent obtained.  Consent given by: patient  Patient understanding: patient states understanding of the procedure being performed  Patient consent: the patient's understanding of the procedure matches consent given  Procedure consent: procedure consent matches procedure scheduled  Relevant documents: relevant documents present and verified  Patient identity confirmed: verbally with patient, arm band and hospital-assigned identification number  Preparation: Patient was prepped and draped in the usual sterile fashion.  Indications: hemodynamic monitoring  Orientation:  Right  Location: radial artery  Procedure Details:  Josef's test normal: yes  Needle gauge: 20  Number of attempts: 1    Post-procedure:  Post-procedure: dressing applied  Patient tolerance: Patient tolerated the procedure well with no immediate complications

## 2025-01-27 NOTE — OP NOTE
OPERATIVE REPORT  PATIENT NAME: Karli Maradiaga    :  1945  MRN: [id number]  Pt Location: [facility]BRID OR ROOM 02    SURGERY DATE: 2025    Surgeons and Role:     * Rodríguez Neff DO - Primary     * Jake Quick MD - Fellow    Preop Diagnosis:  Penetrating atherosclerotic ulcer of aorta (HCC) [I71.9]    Post-Op Diagnosis Codes:     * Penetrating atherosclerotic ulcer of aorta (HCC) [I71.9]    Procedure(s):  Bilateral ultrasound guided common femoral access  EVAR using Endologix AFX 22-60/13-40 device  Bilateral common femoral proglide closure  Supervision and interpretation of all imaging    Estimated Blood Loss:   50 mL    Anesthesia Type:   General    Operative Indications:  78 yo F smoker w/ known asymptomatic infrarenal aortic BROCK presented to the office to review her imaging and was offered repair.    Complications:   None    Procedure and Technique:  The patient was brought to the operative suite and placed in supine position. Anesthesia was induced and titrated to effect and the patient was intubated. Preoperative antibiotics were administered and a biggs catheter and arterial line were placed. She was then prepped and draped in usual sterile fashion and a timeout was performed per hospital policy correctly identifying the patient, procedure, and laterality.    The right groin was assessed via ultrasonography and a micropuncture kit was used to cannulate the common femoral artery.  Fluoroscopy was used to confirm wire trajectory.  A microsheath was used to introduce a bentson wire.  The microsheath was removed and two proglide closure devices were placed for future use. An 8 [place] sheath was then placed. The left groin was then assessed via ultrasonography and a micropuncture kit was used to cannulate the common femoral artery.  Fluoroscopy was used to confirm wire trajectory.  A microsheath was used to introduce a bentson wire.  The microsheath was removed and a single proglide closure device was  placed for future use. An 8 Swedish sheath was then placed. An Ensnare snare was then placed into the distal abdominal aorta via the left groin. The bentson wire in the right groin was exchanged for a lunderquist wire through a kumpe catheter and the Enodlogix AFX 17 Swedish sheath was placed into the distal abdominal aorta. We then loaded the [id number] AFX2 bifurcated device onto the stiff wire and advanced the contralateral wire up through the AFX introducer sheath using the preloaded wire guide. The contralateral wire was then successfully snared using the ensnare device and pulled out through the contralateral side. The AFX device was then advanced and  with the introducer sheath and was advanced under fluoroscopy until the distal limbs were above the aortic bifurcation releasing the limbs of the graft. The entire system was then pulled down to sit on the aortic bifurcation. The main body was then deployed. The contralateral limb was also deployed. A pigtail catheter was advanced over the contralateral wire and the wire was unlocked from the system and removed from the body. Finally the ipsilateral limb was deployed. Once the device introducer was removed, a coda aortic compliant balloon was advanced via the right groin and the proximal endo of the endograft main body was ballooned as well as the ipsilateral limb. An amplatz wire was then placed into the contralateral limb through the pigtail catheter and a [id number]  balloon was inflated to nominal pressure in the contralateral limb. We then performed our completion angiogram which demonstrated good apposition of the graft throughout its length, patent renal arteries, and no type I or type III endoleak was appreciated. There was a small type II endoleak appreciated coming from lumbar arteries directly adjacent to the BROCK. At this point we were satisfied with our repair, so we opted to conclude the procedure. The right groin sheath and wire system was  removed and the proglides were cinched down for closure of the arteriotomy. The left sheath and catheter system was then removed and that proglide was cinched down for closure of the arteriotomy. All closure devices were used successfully with no appreciable hematoma after 2 minutes of manual pressure.     The patient was then allowed to emerge from anesthesia and was extubated. She tolerated the procedure well with no immediate post procedural complications. She had palpable DP and PT pulses bilaterally at the conclusion of the case. All counts were correct as reported to me. She was taken to PACU in stable condition. Of note, Dr. Neff was present for the entirety of the procedure.       SIGNATURE: Jake Quick MD  DATE: January 27, 2025  TIME: 12:59 PM            Vascular Quality Initiative - EVAR    Patient History  Preop Functional Status: Fully active; able to carry on all predisease activities without restriction.  Genetic History: none  Mental Status:Normal  Prior Aortic Surgery: none  Unfit for Open AAA Repair?:  no  Urgency:Elective   Maximum AAA Diameter: 20 mm  Right Iliac Aneurysm: none  Left Iliac Aneurysm: none  Aortic Neck Length:  72 mm  Aortic Neck Diameter:  19 mm  Aorta-Neck Angle: <45 degrees  Neck-AAA Angle: <45 degrees  IlIiac Aneurysm: No        Procedure Information  Anesthesia Type: general  ASA: III   Skin Prep:Chlorhexidene  EBL: 50 mL  Fluoro time:   15.4 min  Total Procedure Time:  Event Time In   Procedure Start 1138   Procedure Closing    Procedure Finish 1245        Right-sided access: Percutaneous femoral. Ultrasound Guidance: yes.  Largest Sheath Size (Fr): 17. Closure Device Type is: Abbott Perclose Proglide. [place] of Closure Devices: 2..     Right-sided iliac adjunct: none    Left-sided access: Percutaneous femoral. Ultrasound Guidance: yes.  Largest Sheath Size (Fr): 8. Closure Device Type is: Abbott Perclose Proglide. [place] of Closure Devices: 1..        Left-sided iliac  adjunct:  none    Aortic main device:  bifurcated, infra-renal    Right iliac endpoint: Common  Left iliac endpoint: Common    Anchors Used?:  none    Coil Occlusion?: none    Devices  ______________________________________________________________    Main Aortic Device : Endologix AFX  Device Diameter: 22  Device Length:  60  ______________________________________________________________    Number of Proximal Aortic Extensions:  none  ______________________________________________________________    Number of RIGHT Iliac Devices:  none  ______________________________________________________________    Number of LEFT Iliac Devices:  none  ______________________________________________________________    Intraoperative Complications  Any Intraoperative Complications/Endoleak?: Yes  Endoleak at completion?: lumbar branch (II)  Renal artery coverage?:  no  Iliac artery injury?: none  Iliac/Femoral Thrombectomy?: no  Distal Embolectomy?: no  Conversion to open repair?:  no  Other complications?: no

## 2025-01-27 NOTE — ANESTHESIA POSTPROCEDURE EVALUATION
Post-Op Assessment Note    CV Status:  Stable  Pain Score: 0    Pain management: adequate       Mental Status:  Sleepy and arousable   Hydration Status:  Euvolemic and stable   PONV Controlled:  Controlled   Airway Patency:  Patent     Post Op Vitals Reviewed: Yes    No anethesia notable event occurred.    Staff: CRNA, Anesthesiologist           Last Filed PACU Vitals:  Vitals Value Taken Time   Temp     Pulse 80 01/27/25 1310   /67 01/27/25 1308   Resp 30 01/27/25 1310   SpO2 92 % 01/27/25 1310   Vitals shown include unfiled device data.

## 2025-01-28 ENCOUNTER — DOCUMENTATION (OUTPATIENT)
Dept: VASCULAR SURGERY | Facility: CLINIC | Age: 80
End: 2025-01-28

## 2025-01-28 ENCOUNTER — TRANSITIONAL CARE MANAGEMENT (OUTPATIENT)
Dept: FAMILY MEDICINE CLINIC | Facility: CLINIC | Age: 80
End: 2025-01-28

## 2025-01-28 VITALS
HEIGHT: 62 IN | OXYGEN SATURATION: 96 % | BODY MASS INDEX: 18.4 KG/M2 | SYSTOLIC BLOOD PRESSURE: 132 MMHG | TEMPERATURE: 98 F | DIASTOLIC BLOOD PRESSURE: 58 MMHG | WEIGHT: 100 LBS | HEART RATE: 75 BPM | RESPIRATION RATE: 15 BRPM

## 2025-01-28 LAB
ANION GAP SERPL CALCULATED.3IONS-SCNC: 6 MMOL/L (ref 4–13)
BUN SERPL-MCNC: 9 MG/DL (ref 5–25)
CALCIUM SERPL-MCNC: 8.7 MG/DL (ref 8.4–10.2)
CHLORIDE SERPL-SCNC: 98 MMOL/L (ref 96–108)
CO2 SERPL-SCNC: 29 MMOL/L (ref 21–32)
CREAT SERPL-MCNC: 0.45 MG/DL (ref 0.6–1.3)
ERYTHROCYTE [DISTWIDTH] IN BLOOD BY AUTOMATED COUNT: 15.3 % (ref 11.6–15.1)
GFR SERPL CREATININE-BSD FRML MDRD: 96 ML/MIN/1.73SQ M
GLUCOSE SERPL-MCNC: 100 MG/DL (ref 65–140)
HCT VFR BLD AUTO: 32.4 % (ref 34.8–46.1)
HGB BLD-MCNC: 11 G/DL (ref 11.5–15.4)
MCH RBC QN AUTO: 31.1 PG (ref 26.8–34.3)
MCHC RBC AUTO-ENTMCNC: 34 G/DL (ref 31.4–37.4)
MCV RBC AUTO: 92 FL (ref 82–98)
PLATELET # BLD AUTO: 189 THOUSANDS/UL (ref 149–390)
PMV BLD AUTO: 9.2 FL (ref 8.9–12.7)
POTASSIUM SERPL-SCNC: 3.7 MMOL/L (ref 3.5–5.3)
RBC # BLD AUTO: 3.54 MILLION/UL (ref 3.81–5.12)
SODIUM SERPL-SCNC: 133 MMOL/L (ref 135–147)
WBC # BLD AUTO: 13.17 THOUSAND/UL (ref 4.31–10.16)

## 2025-01-28 PROCEDURE — 99024 POSTOP FOLLOW-UP VISIT: CPT | Performed by: SURGERY

## 2025-01-28 PROCEDURE — 85027 COMPLETE CBC AUTOMATED: CPT | Performed by: SURGERY

## 2025-01-28 PROCEDURE — 80048 BASIC METABOLIC PNL TOTAL CA: CPT | Performed by: SURGERY

## 2025-01-28 RX ADMIN — HEPARIN SODIUM 5000 UNITS: 5000 INJECTION, SOLUTION INTRAVENOUS; SUBCUTANEOUS at 05:03

## 2025-01-28 RX ADMIN — ATORVASTATIN CALCIUM 40 MG: 40 TABLET, FILM COATED ORAL at 08:19

## 2025-01-28 RX ADMIN — STANDARDIZED SENNA CONCENTRATE 8.6 MG: 8.6 TABLET ORAL at 08:19

## 2025-01-28 RX ADMIN — CITALOPRAM HYDROBROMIDE 10 MG: 10 TABLET ORAL at 08:19

## 2025-01-28 RX ADMIN — METOPROLOL SUCCINATE 25 MG: 25 TABLET, FILM COATED, EXTENDED RELEASE ORAL at 08:19

## 2025-01-28 RX ADMIN — ASPIRIN 81 MG CHEWABLE TABLET 81 MG: 81 TABLET CHEWABLE at 08:19

## 2025-01-28 RX ADMIN — LEVOTHYROXINE SODIUM 50 MCG: 0.05 TABLET ORAL at 05:03

## 2025-01-28 RX ADMIN — VITAM B12 100 MCG: 100 TAB at 08:19

## 2025-01-28 NOTE — PLAN OF CARE
Problem: Prexisting or High Potential for Compromised Skin Integrity  Goal: Skin integrity is maintained or improved  Description: INTERVENTIONS:  - Identify patients at risk for skin breakdown  - Assess and monitor skin integrity  - Assess and monitor nutrition and hydration status  - Monitor labs   - Assess for incontinence   - Turn and reposition patient  - Assist with mobility/ambulation  - Relieve pressure over bony prominences  - Avoid friction and shearing  - Provide appropriate hygiene as needed including keeping skin clean and dry  - Evaluate need for skin moisturizer/barrier cream  - Collaborate with interdisciplinary team   - Patient/family teaching  - Consider wound care consult   1/28/2025 0246 by Sarah Huddleston  Outcome: Progressing  1/28/2025 0246 by Sarah Huddleston  Outcome: Progressing     Problem: PAIN - ADULT  Goal: Verbalizes/displays adequate comfort level or baseline comfort level  Description: Interventions:  - Encourage patient to monitor pain and request assistance  - Assess pain using appropriate pain scale  - Administer analgesics based on type and severity of pain and evaluate response  - Implement non-pharmacological measures as appropriate and evaluate response  - Consider cultural and social influences on pain and pain management  - Notify physician/advanced practitioner if interventions unsuccessful or patient reports new pain  Outcome: Progressing     Problem: INFECTION - ADULT  Goal: Absence or prevention of progression during hospitalization  Description: INTERVENTIONS:  - Assess and monitor for signs and symptoms of infection  - Monitor lab/diagnostic results  - Monitor all insertion sites, i.e. indwelling lines, tubes, and drains  - Monitor endotracheal if appropriate and nasal secretions for changes in amount and color  - [place] appropriate cooling/warming therapies per order  - Administer medications as ordered  - Instruct and encourage patient and family to use good hand  hygiene technique  - Identify and instruct in appropriate isolation precautions for identified infection/condition  Outcome: Progressing  Goal: Absence of fever/infection during neutropenic period  Description: INTERVENTIONS:  - Monitor WBC    Outcome: Progressing     Problem: SAFETY ADULT  Goal: Patient will remain free of falls  Description: INTERVENTIONS:  - Educate patient/family on patient safety including physical limitations  - Instruct patient to call for assistance with activity   - Consult OT/PT to assist with strengthening/mobility   - Keep Call bell within reach  - Keep bed low and locked with side rails adjusted as appropriate  - Keep care items and personal belongings within reach  - Initiate and maintain comfort rounds  - Make Fall Risk Sign visible to staff  - Offer Toileting every  Hours, in advance of need  - Initiate/Maintain alarm  - Obtain necessary fall risk management equipment:   - Apply yellow socks and bracelet for high fall risk patients  - Consider moving patient to room near nurses station  Outcome: Progressing  Goal: Maintain or return to baseline ADL function  Description: INTERVENTIONS:  -  Assess patient's ability to carry out ADLs; assess patient's baseline for ADL function and identify physical deficits which impact ability to perform ADLs (bathing, care of mouth/teeth, toileting, grooming, dressing, etc.)  - Assess/evaluate cause of self-care deficits   - Assess range of motion  - Assess patient's mobility; develop plan if impaired  - Assess patient's need for assistive devices and provide as appropriate  - Encourage maximum independence but intervene and supervise when necessary  - Involve family in performance of ADLs  - Assess for home care needs following discharge   - Consider OT consult to assist with ADL evaluation and planning for discharge  - Provide patient education as appropriate  Outcome: Progressing  Goal: Maintains/Returns to pre admission functional  level  Description: INTERVENTIONS:  - Perform AM-PAC 6 Click Basic Mobility/ Daily Activity assessment daily.  - Set and communicate daily mobility goal to care team and patient/family/caregiver.   - Collaborate with rehabilitation services on mobility goals if consulted  - Perform Range of Motion  times a day.  - Reposition patient every  hours.  - Dangle patient  times a day  - Stand patient  times a day  - Ambulate patient  times a day  - Out of bed to chair  times a day   - Out of bed for meals  times a day  - Out of bed for toileting  - Record patient progress and toleration of activity level   Outcome: Progressing     Problem: DISCHARGE PLANNING  Goal: Discharge to home or other facility with appropriate resources  Description: INTERVENTIONS:  - Identify barriers to discharge w/patient and caregiver  - Arrange for needed discharge resources and transportation as appropriate  - Identify discharge learning needs (meds, wound care, etc.)  - Arrange for interpretive services to assist at discharge as needed  - Refer to Case Management Department for coordinating discharge planning if the patient needs post-hospital services based on physician/advanced practitioner order or complex needs related to functional status, cognitive ability, or social support system  Outcome: Progressing     Problem: Knowledge Deficit  Goal: Patient/family/caregiver demonstrates understanding of disease process, treatment plan, medications, and discharge instructions  Description: Complete learning assessment and assess knowledge base.  Interventions:  - Provide teaching at level of understanding  - Provide teaching via preferred learning methods  Outcome: Progressing     Problem: CARDIOVASCULAR - ADULT  Goal: Maintains optimal cardiac output and hemodynamic stability  Description: INTERVENTIONS:  - Monitor I/O, vital signs and rhythm  - Monitor for S/S and trends of decreased cardiac output  - Administer and titrate ordered vasoactive  medications to optimize hemodynamic stability  - Assess quality of pulses, skin color and temperature  - Assess for signs of decreased coronary artery perfusion  - Instruct patient to report change in severity of symptoms  Outcome: Progressing  Goal: Absence of cardiac dysrhythmias or at baseline rhythm  Description: INTERVENTIONS:  - Continuous cardiac monitoring, vital signs, obtain 12 lead EKG if ordered  - Administer antiarrhythmic and heart rate control medications as ordered  - Monitor electrolytes and administer replacement therapy as ordered  Outcome: Progressing

## 2025-01-28 NOTE — PROGRESS NOTES
Vascular Nurse Navigator Post Op Education    Attempted to meet patient prior to discharge to review post op discharge instructions, but when arrived to floor, patient was already discharged to home.  Spoke with patient's bedside RN Latha who stated that she reviewed all discharge instructions with patient.  Will follow up with patient post discharge.

## 2025-01-28 NOTE — DISCHARGE SUMMARY
Discharge Summary   Karli Maradiaga 79 y.o. female MRN: [id number]  Unit/Bed#: [facility] 529-01 Encounter: [id number]    Admission Date: 1/27/2025     Discharge Date:01/28/25    Attending:Rodríguez Neff DO     Consultants: -    Admitting Diagnosis: Penetrating atherosclerotic ulcer of aorta (HCC) [I71.9]    Principle/ Secondary Diagnosis:  Infrarenal BROCK  Postoperative acute blood loss anemia secondary to procedural and dilutional loss    Past Medical History:   Diagnosis Date    Anxiety     Last Assessed: 6/12/2017     COPD (chronic obstructive pulmonary disease) ([facility])     Depression     FH: colonic polyps     Glaucoma     Hyperlipidemia     Hypertension     Last Assessed: 12/12/2017    Hypothyroidism     Last Assessed: 6/12/2017    Menopause     Normal vaginal delivery     Osteopenia     Pap smear abnormality of cervix with ASCUS favoring benign     Telangiectasis     Vision loss of right eye      Past Surgical History:   Procedure Laterality Date    CATARACT EXTRACTION      COLONOSCOPY      complete    DILATION AND CURETTAGE OF UTERUS  01/10/1978    HEMORROIDECTOMY      x2    LAPAROSCOPY      Diagnostic     TUBAL LIGATION         Procedures Performed:  1/27/2025 NATALIYA Neff    Laboratory data at discharge:   Results from last 7 days   Lab Units 01/28/25  0501 01/27/25  0909   WBC Thousand/uL 13.17* 6.85   HEMOGLOBIN g/dL 11.0* 12.4   HEMATOCRIT % 32.4* 39.1   PLATELETS Thousands/uL 189 231     Results from last 7 days   Lab Units 01/28/25  0501 01/27/25  0909   POTASSIUM mmol/L 3.7 3.9   CHLORIDE mmol/L 98 101   CO2 mmol/L 29 33*   BUN mg/dL 9 11   CREATININE mg/dL 0.45* 0.63   CALCIUM mg/dL 8.7 9.5               Discharge instructions/Information to patient and family:   See after visit summary for information provided to patient and family.   EVAR instructions    Discharge Medications:  See after visit summary for reconciled discharge medications provided to patient and family.    Continued  antiplatelet-aspirin  Continued statin-Lipitor    Hospital Course:   Karli Maradiaga is a 79-year-old female with history of hypertension, hypothyroidism, anxiety, depression and tobacco abuse who was seen in the outpatient setting for incidental finding of infrarenal penetrating aortic ulcer on CAT scan.  There was a left lateral ulcer in the mid infrarenal aorta 2.5 cm in width by 1.5 cm in depth.  She was recommended surgical intervention.  Prior to proceeding she was evaluated by cardiology for risk stratification.    On 1/27/2025, the patient was electively admitted to [facility] at which time she was taken to the operating room and underwent EVAR by Dr. Neff.    Postprocedure, she was maintained in the PACU then transferred to medical surgical/telemetry/stepdown floor where she continued to convalesce for the remainder of her hospitalization    POD #1, the patient remained neurovascularly intact with palpable DP and PT pulses.  Puncture sites were stable her pain was controlled with minimal use of Tylenol and no narcotic.  She was tolerating a diet.  She was voiding spontaneously post Solis catheter removal.  She was ambulatory.  She was deemed appropriate and stable for discharge and was discharged in the care of her family on 1/28/2025      Hospital course was complicated by the following:  Postoperative acute blood loss anemia secondary to procedural and dilutional loss      Prior to discharge, the patient was given instructions for outpatient care and follow-up.  The patient has been instructed to call w/ any questions, changes, or concerns for the medical condition.    For further details of the hospitalization, please refer to the medical record.    Condition at Discharge: stable     Provisions for Follow-Up Care:  See after visit summary for information related to follow-up care and any pertinent home health orders.      Disposition: Home    Planned Readmission: No    Discharge Statement    I spent 30 minutes discharging the patient. This time was spent on the day of discharge. I had direct contact with the patient on the day of discharge. Additional documentation is required if more than 30 minutes were spent on discharge.     Velia Lemus PA-C  1/28/2025      This text is generated with voice recognition software. There may be translation, syntax,  or grammatical errors. If you have any questions, please contact the dictating provider.

## 2025-01-28 NOTE — DISCHARGE INSTR - AVS FIRST PAGE
DISCHARGE INSTRUCTIONS  ENDOVASCULAR ANEURYSM REPAIR    ACTIVITY:  Limit your activity to walking for the first week after surgery. Avoid heavy lifting (no more than 15 lbs) for 2 weeks after surgery. Walking up steps and normal activities may be resumed as you feel ready.   You should not drive a car for at least 1 week following discharge from the hospital and you are off all narcotic pain medications. You may ride in a car.   You may notice a mild back or left flank pain after surgery.  If this becomes severe or does not improve, please call the office.  If you have any questions regarding a particular activity, please discuss with your doctor or nurse before you are discharged.    DIET:  Resume your normal diet.  Drink more water than usual for the next 24 hours.    PROCEDURE SITE: You may have a procedure site in your groin, arm, or foot.  You may have surgical glue at your procedure site.  The glue is used to cover the procedure site, assist in closure, and prevent contamination. This adhesive will darken and peel away on its own within one to two weeks. Do not pick at it.    You should shower daily.  Wash incision daily with soap and water, but do not rub or scrub the incision; rinse thoroughly and pat dry.  Do not bathe in a tub or swim for the first 2 week following surgery or if you have any open wounds.  It is normal to have some bruising, swelling or discoloration around the procedure site/incision.  IF increasing redness, pain, or a bulge develops, call our office immediately.  If present, you may remove the band-aid or “steri-strips” over your procedure site/incision after two days.   If you notice any active bleeding at the site, apply pressure to the site and call our office ([phone]) or [phone].    FOLLOW UP STUDIES:  Doppler ultrasound studies and cat scans are very important to your post-operative care.  Your surgeon will arrange for them at your first postoperative visit. You will most likely  get a cat scan at 1 month and an ultrasound at 3 months following surgery.    FOLLOW UP APPOINTMENTS:  Making and keeping follow up appointments and ultrasound tests are important to your recovery.  If you have difficulty making it to or keeping your follow up appointments, call the office.    If you have increased pain, fever >101.5, increased drainage, redness or a bad smell at your surgery site, new coldness/numbness of your arm or leg, please call us immediately and GO directly to the ER.    Appt w/ Roxy Santillan PA-C: 2/10/2025 at 3:30pm, [place] office      PLEASE CALL THE OFFICE IF YOU HAVE ANY QUESTIONS  [phone]  [phone] [address], [address], [place] [address]  [address] [place] [address]  [address] [place] [address]  [address] [facility], [place] [address]  [address] [facility], [address] [address]  [address] [facility], [address] [address]  [address] [facility], [address], 2nd Floor, [place] [address]  [address] [facility], 1st Floor, [address], [place] [address]  [address] [facility] [place] [address]  [address] [facility], [address], [place] [address]

## 2025-01-28 NOTE — UTILIZATION REVIEW
Initial Clinical Review    Elective INPATIENT surgical procedure  Age/Sex: 79 y.o. female  Surgery Date: 1/27/2025   Procedure: Bilateral ultrasound guided common femoral access / EVAR using Endologix AFX 22-60/13-40 device / Bilateral common femoral proglide closure / Supervision and interpretation of all imaging  Anesthesia: General     Date: 1/28/25    POD#1 s/p Bilateral femoral access with bilateral proglide closure     Discharge Summary  Hospital Course:   Karli Maradiaga is a 79-year-old female with history of hypertension, hypothyroidism, anxiety, depression and tobacco abuse who was seen in the outpatient setting for incidental finding of infrarenal penetrating aortic ulcer on CAT scan.  There was a left lateral ulcer in the mid infrarenal aorta 2.5 cm in width by 1.5 cm in depth.  She was recommended surgical intervention.  Prior to proceeding she was evaluated by cardiology for risk stratification.     On 1/27/2025, the patient was electively admitted to [facility] at which time she was taken to the operating room and underwent EVAR by Dr. Neff.     Postprocedure, she was maintained in the PACU then transferred to medical surgical/telemetry/stepdown floor where she continued to convalesce for the remainder of her hospitalization     POD #1, the patient remained neurovascularly intact with palpable DP and PT pulses.  Puncture sites were stable her pain was controlled with minimal use of Tylenol and no narcotic.  She was tolerating a diet.  She was voiding spontaneously post Solis catheter removal.  She was ambulatory.  She was deemed appropriate and stable for discharge and was discharged in the care of her family on 1/28/2025         Admission Orders: Date/Time/Statement:   Admission Orders (From admission, onward)       Ordered        01/27/25 1259  Inpatient Admission  Once                          Orders Placed This Encounter   Procedures    Inpatient Admission     Standing Status:    Standing     Number of Occurrences:   1     Level of Care:   Level 1 Stepdown [13]     Estimated length of stay:   More than 2 Midnights     Certification:   I certify that inpatient services are medically necessary for this patient for a duration of greater than two midnights. See H&P and MD Progress Notes for additional information about the patient's course of treatment.     Diet: NPO adv. To regular   Mobility: OOB as greer  DVT Prophylaxis: SCD    Medications/Pain Control:   Scheduled Medications:  acetaminophen, 975 mg, Oral, Q8H BURT  aspirin, 81 mg, Oral, Daily  atorvastatin, 40 mg, Oral, Daily  citalopram, 10 mg, Oral, Daily  cyanocobalamin, 100 mcg, Oral, Daily  heparin (porcine), 5,000 Units, Subcutaneous, Q8H BURT  latanoprost, 1 drop, Both Eyes, HS  levothyroxine, 50 mcg, Oral, Early Morning  metoprolol succinate, 25 mg, Oral, Daily  senna, 1 tablet, Oral, Daily      Continuous IV Infusions: None       PRN Meds:  ondansetron, 4 mg, Intravenous, Q6H PRN  oxyCODONE, 2.5 mg, Oral, Q4H PRN  oxyCODONE, 5 mg, Oral, Q4H PRN      Vital Signs (last 3 days)       Date/Time Temp Pulse Resp BP MAP (mmHg) Arterial Line BP MAP SpO2 Calculated FIO2 (%) - Nasal Cannula Nasal Cannula O2 Flow Rate (L/min) O2 Device Cardiac (WDL) Patient Position - Orthostatic VS Pain    01/28/25 0828 -- -- -- -- -- -- -- 96 % 28 2 L/min -- -- -- No Pain    01/28/25 06:46:11 98 °F (36.7 °C) 75 15 132/58 77 137/65 88 mmHg 94 % -- -- -- -- Lying --    01/28/25 0500 -- -- -- -- -- -- -- -- -- -- -- -- -- No Pain    01/28/25 02:43:34 98.6 °F (37 °C) 72 18 136/53 81 -- -- 97 % -- -- -- -- Lying --    01/28/25 0215 -- -- -- -- -- -- -- -- 28 2 L/min Nasal cannula -- -- --    01/28/25 0045 -- -- -- 121/49 -- -- -- -- -- -- None (Room air) -- -- No Pain    01/27/25 21:58:30 98.5 °F (36.9 °C) 57 18 120/54 80 -- -- 92 % -- -- -- -- Lying --    01/27/25 20:30:20 98.3 °F (36.8 °C) 61 18 117/55 78 -- -- 95 % -- -- None (Room air) -- Lying No Pain     01/27/25 1900 -- 56 -- 119/52 -- -- -- -- -- -- -- -- -- --    01/27/25 1800 -- 54 -- 126/51 -- -- -- -- -- -- -- -- -- --    01/27/25 1733 -- -- -- -- -- -- -- -- -- -- -- -- -- 5    01/27/25 1700 -- 52 -- 118/49 -- -- -- -- -- -- -- -- -- --    01/27/25 16:07:08 98 °F (36.7 °C) 61 17 134/53 -- -- -- 94 % 36 4 L/min Nasal cannula -- -- No Pain    01/27/25 1545 -- 54 16 -- -- 136/67 96 mmHg 100 % -- -- -- -- -- --    01/27/25 1530 -- 53 16 135/60 87 118/76 96 mmHg 100 % 36 4 L/min Nasal cannula WDL -- No Pain    01/27/25 1515 -- 54 17 125/57 84 125/78 95 mmHg 100 % 36 4 L/min Nasal cannula -- -- No Pain    01/27/25 1500 -- 55 13 132/60 87 115/76 95 mmHg 100 % 36 4 L/min Nasal cannula WDL -- No Pain    01/27/25 1445 -- 56 15 125/59 85 109/74 92 mmHg 100 % 36 4 L/min Nasal cannula -- -- No Pain    01/27/25 1430 -- 59 21 125/59 85 121/78 94 mmHg 100 % 36 4 L/min Nasal cannula WDL -- No Pain    01/27/25 1415 -- 57 14 125/56 84 139/84 102 mmHg 100 % 36 4 L/min Nasal cannula -- -- No Pain    01/27/25 1400 -- 61 12 125/60 86 135/85 103 mmHg 100 % 36 4 L/min Nasal cannula WDL -- No Pain    01/27/25 1345 -- 64 17 104/52 75 122/70 89 mmHg 100 % 36 4 L/min Nasal cannula WDL -- No Pain    01/27/25 1330 -- 74 20 103/59 78 112/60 81 mmHg 100 % 36 4 L/min Nasal cannula WDL -- No Pain    01/27/25 1315 -- 77 18 111/60 78 104/65 81 mmHg 96 % 36 4 L/min Nasal cannula WDL -- No Pain    01/27/25 1307 98.9 °F (37.2 °C) 79 20 112/67 83 -- -- 96 % -- -- None (Room air) WDL -- No Pain    01/27/25 0848 96.6 °F (35.9 °C) 61 20 132/76 -- -- -- 95 % -- -- None (Room air) -- -- No Pain          Weight (last 2 days)       Date/Time Weight    01/27/25 16:07:08 45.4 (100)    01/27/25 0848 44.9 (99)            Pertinent Labs/Diagnostic Test Results:   Radiology:  IR EVAR    (Results Pending)     Cardiology:  No orders to display     GI:  No orders to display           Results from last 7 days   Lab Units 01/28/25  0501 01/27/25  0909   WBC  "Thousand/uL 13.17* 6.85   HEMOGLOBIN g/dL 11.0* 12.4   HEMATOCRIT % 32.4* 39.1   PLATELETS Thousands/uL 189 231         Results from last 7 days   Lab Units 01/28/25  0501 01/27/25  0909   SODIUM mmol/L 133* 140   POTASSIUM mmol/L 3.7 3.9   CHLORIDE mmol/L 98 101   CO2 mmol/L 29 33*   ANION GAP mmol/L 6 6   BUN mg/dL 9 11   CREATININE mg/dL 0.45* 0.63   EGFR ml/min/1.73sq m 96 86   CALCIUM mg/dL 8.7 9.5             Results from last 7 days   Lab Units 01/28/25  0501 01/27/25  0909   GLUCOSE RANDOM mg/dL 100 91             No results found for: \"BETA-HYDROXYBUTYRATE\"                                                                                                                                         Network Utilization Review Department  ATTENTION: Please call with any questions or concerns to [phone] and carefully listen to the prompts so that you are directed to the right person. All voicemails are confidential.   For Discharge needs, contact Care Management DC Support Team at [phone] opt. 2  Send all requests for admission clinical reviews, approved or denied determinations and any other requests to dedicated fax number below belonging to the campus where the patient is receiving treatment. List of dedicated fax numbers for the Facilities:  FACILITY NAME UR FAX NUMBER   ADMISSION DENIALS (Administrative/Medical Necessity) [phone]   DISCHARGE SUPPORT TEAM (NETWORK) [phone]   PARENT CHILD HEALTH (Maternity/NICU/Pediatrics) [phone]   [facility] [phone]   [facility] [phone]   [facility] [phone]   [facility] [phone]   [facility] [phone]   [facility] [phone]   General acute hospital [phone]   [facility] [phone]   [facility] " [place] [phone]   [facility] [phone]   [facility] [phone]   [facility] [phone]

## 2025-01-29 DIAGNOSIS — F41.9 ANXIETY: ICD-10-CM

## 2025-01-29 RX ORDER — ALPRAZOLAM 1 MG/1
1 TABLET ORAL 2 TIMES DAILY PRN
Qty: 60 TABLET | Refills: 3 | Status: SHIPPED | OUTPATIENT
Start: 2025-01-29

## 2025-01-29 NOTE — TELEPHONE ENCOUNTER
Reason for call:   [x] Refill   [] Prior Auth  [] Other:     Office:   [x] PCP/Provider - Jasson Salinas MD   [] Specialty/Provider -     Medication: ALPRAZolam (XANAX) 1 mg tablet     Dose/Frequency: 1 mg    Quantity: 60    Pharmacy: [id number]  [place] [address]     Does the patient have enough for 3 days?   [] Yes   [x] No - Send as HP to POD

## 2025-01-29 NOTE — PROGRESS NOTES
Spoke with patient about scheduleing TCM and she refused, stating that she has enough appointment and this one is not necessary.  I did explain that we will not be able to refill any types of medications or do anything until she is seen.  Patient still refused

## 2025-01-29 NOTE — UTILIZATION REVIEW
NOTIFICATION OF ADMISSION DISCHARGE   This is a Notification of Discharge from [facility]. Please be advised that this patient has been discharge from our facility. Below you will find the admission and discharge date and time including the patient’s disposition.   UTILIZATION REVIEW CONTACT:  Merly Ansari  Utilization   Network Utilization Review Department  Phone: [phone] x carefully listen to the prompts. All voicemails are confidential.  Email: [email or web address]     ADMISSION INFORMATION  PRESENTATION DATE: 1/27/2025  8:27 AM  OBERVATION ADMISSION DATE: N/A  INPATIENT ADMISSION DATE: 1/27/25 12:59 PM   DISCHARGE DATE: 1/28/2025 11:59 AM   DISPOSITION:Home/Self Care    Network Utilization Review Department  ATTENTION: Please call with any questions or concerns to [phone] and carefully listen to the prompts so that you are directed to the right person. All voicemails are confidential.   For Discharge needs, contact Care Management DC Support Team at [phone] opt. 2  Send all requests for admission clinical reviews, approved or denied determinations and any other requests to dedicated fax number below belonging to the campus where the patient is receiving treatment. List of dedicated fax numbers for the Facilities:  FACILITY NAME UR FAX NUMBER   ADMISSION DENIALS (Administrative/Medical Necessity) [phone]   DISCHARGE SUPPORT TEAM ([facility]) [phone]   PARENT CHILD HEALTH (Maternity/NICU/Pediatrics) [phone]   [facility] [phone]   [facility] [phone]   [facility] [phone]   [facility] [phone]   [facility] [phone]   [facility] [phone]   [facility] [phone]   [facility] [phone]    [facility] [phone]   [facility] [phone]   [facility] [phone]   [facility] [phone]

## 2025-01-30 ENCOUNTER — TELEPHONE (OUTPATIENT)
Dept: VASCULAR SURGERY | Facility: CLINIC | Age: 80
End: 2025-01-30

## 2025-01-30 NOTE — TELEPHONE ENCOUNTER
Vascular Nurse Navigator Post Op Call    Procedure:   Bilateral ultrasound guided common femoral access  EVAR using Endologix AFX 22-60/13-40 device  Bilateral common femoral proglide closure  Supervision and interpretation of all imaging    Date of Procedure: 1/27/25    Surgeon:    * Rodríguez Neff DO - Primary     * Jake Quick MD - Fellow    Discharge Date: 1/28/25    Discharge Disposition: Home    Leg Weakness or Swelling?: No    Leg Numbness?: No    Chest Pain?: No    Shortness of Breath?: No    Orthopnea?: No    Anticoagulation pt was discharged on post op?: Aspirin    Statin pt was discharged on post op?:  Lipitor (atorvastatin)    Bleeding?: No    Uncontrolled Pain?: No    Incision Concerns?: No    Bowel Concerns?  No    Appetite Concerns? No    Fever or Chills?: No      Reviewed discharge instructions and incision care with patient.      NEXT OFFICE VISIT SCHEDULED:  2/10/25 at 3:30 pm with Roxy Santillan PA-C at [facility]    Transportation Confirmed?: Yes      Any further questions/concerns?  Patient stated that she is doing good since discharge.  She stated that her left gorin is sore, but denies any lumps or firm areas.  Reviewed incision care with her - wash daily with soap and water.  All quesitons answered.  No concerns expressed at this time.      Patient stated that she quit smoking on 1/26/25.    Tobacco use is a significant patient-modifiable risk factor for this patient’s vascular disease with multiple vascular comorbidities, and a significant risk factor for failure of and complications from any endovascular or surgical interventions.    I explained to the patient the effects of smoking including peripheral artery disease, coronary artery disease, cerebrovascular disease as well as cancer and chronic obstructive pulmonary disease. I asked the patient to stop smoking immediately. It is never too late to quit, and many studies show significant health benefits as well as  economical savings after smoking cessation. I offered to the patient nicotine replacement therapy as well as referral to the smoking cessation program and access to the quit line 1-194-RPFRLPC or ambulatory referral to our network smoking cessation program.    Based on our conversation, this patient appears motivated to quit And declined my offer of nicotine replacement or tobacco cessation medications    The patient set a quit date of 1/26/25 .     I spent approximately 4 minutes on tobacco cessation counseling with this patient.

## 2025-02-05 DIAGNOSIS — E03.9 HYPOTHYROIDISM, UNSPECIFIED TYPE: ICD-10-CM

## 2025-02-05 RX ORDER — LEVOTHYROXINE SODIUM 50 UG/1
TABLET ORAL
Qty: 100 TABLET | Refills: 0 | Status: SHIPPED | OUTPATIENT
Start: 2025-02-05

## 2025-02-10 ENCOUNTER — OFFICE VISIT (OUTPATIENT)
Dept: VASCULAR SURGERY | Facility: CLINIC | Age: 80
End: 2025-02-10

## 2025-02-10 VITALS
HEIGHT: 62 IN | WEIGHT: 101 LBS | HEART RATE: 66 BPM | BODY MASS INDEX: 18.58 KG/M2 | OXYGEN SATURATION: 97 % | SYSTOLIC BLOOD PRESSURE: 126 MMHG | RESPIRATION RATE: 20 BRPM | DIASTOLIC BLOOD PRESSURE: 82 MMHG

## 2025-02-10 DIAGNOSIS — Z95.828 S/P AAA REPAIR USING BIFURCATION GRAFT: ICD-10-CM

## 2025-02-10 DIAGNOSIS — I71.9 PENETRATING ATHEROSCLEROTIC ULCER OF AORTA (HCC): Primary | ICD-10-CM

## 2025-02-10 DIAGNOSIS — I10 PRIMARY HYPERTENSION: ICD-10-CM

## 2025-02-10 DIAGNOSIS — Z72.0 TOBACCO ABUSE: ICD-10-CM

## 2025-02-10 DIAGNOSIS — Z86.79 S/P AAA REPAIR USING BIFURCATION GRAFT: ICD-10-CM

## 2025-02-10 PROCEDURE — 99024 POSTOP FOLLOW-UP VISIT: CPT | Performed by: PHYSICIAN ASSISTANT

## 2025-02-10 NOTE — PROGRESS NOTES
Name: Karli Maradiaga      : 1945      MRN: [id number]  Encounter Provider: Roxy Satnillan PA-C  Encounter Date: 2/10/2025   Encounter department: [facility]  :  Assessment & Plan  Penetrating atherosclerotic ulcer of aorta (HCC)  80 yoF smoker with hypertension, hypothyroidism and v who underwent AAA EVAR repair Endologix AFX 22-60/13-40 device by Dr. Neff on 25. Surgery was without complication.  Patient presents for first postop visit after EVAR.    RE:  post-op EVAR repair    -No abdominal pain, back or groin pain.   -Walking is much as she wants to walk.  -Gaining wt as expected due decreasing smoking and trying to eat more.  -No problems with groin procedure sites.    -Exam: Abdomen flat. Non-tender, non-palp Ao.  2+ femoral and DP pulses    B endovascular femoral artery procedure sites are soft, non-tender. No Hematoma.     A/P Stable POD #14 after EVAR repair for AAA with atherosclerotic ulcer of the aorta    -Doing well after EVAR. No acute issues.   -Continue with aspirin 81 and atorvastatin 40.   -May engage in activity as tolerated.   -Complete smoking cessation encouraged  -Congratulated on efforts toward smoking cessation; down from 20 > 7 cigarettes/ day  -Patient education regarding AAA provided  -Discussed plan for surveillance  -Check CTA abdomen pelvis in about 1 month and follow-up with Dr. Neff    Orders for CTA abd/pelvis and EVAR du in 3 months and 9 months written       Tobacco abuse  -Smoking cessation encouraged           History of Present Illness   Patient is s/p EVAR on 25 with Dr. Neff.     HPI  Karli Maradiaga is a 80 y.o. female smoker with hypertension, hypothyroidism and v who underwent AAA EVAR repair Endologix AFX 22-60/13-40 device by Dr. Neff on 25. Surgery was without complication.      2/10/25: Patient presents for first postop visit after EVAR.  Patient has no issues.  See above under assessment and plan.    Confirms that she is taking  "aspirin 81 and atorvastatin 40. Of note, no longer on metoprolol succinate for hypertension which was stopped by primary care since she has been normotensive.    BUN/creat 9/0.45, eGFR 96    Review of Systems   Constitutional:  Negative for chills and fever.   Gastrointestinal:  Negative for abdominal distention, abdominal pain, diarrhea, nausea and vomiting.          Objective   /82 (BP Location: Left arm, Patient Position: Sitting)   Pulse 66   Resp 20   Ht 5' 2\" (1.575 m)   Wt 45.8 kg (101 lb)   SpO2 97%   BMI 18.47 kg/m²      Abdomen flat. Non-tender, non-palp Ao.   2+ femoral and DP pulses    B endovascular femoral artery procedure sites are soft, non-tender. No Hematoma.     Physical Exam  Vitals and nursing note reviewed.   Constitutional:       Appearance: She is well-developed.   HENT:      Head: Normocephalic and atraumatic.   Eyes:      Pupils: Pupils are equal, round, and reactive to light.   Neck:      Vascular: No carotid bruit.   Cardiovascular:      Rate and Rhythm: Normal rate and regular rhythm.      Pulses:           Carotid pulses are 2+ on the right side and 2+ on the left side.       Radial pulses are 2+ on the right side and 2+ on the left side.        Femoral pulses are 2+ on the right side and 2+ on the left side.       Dorsalis pedis pulses are 2+ on the right side and 2+ on the left side.      Heart sounds: Normal heart sounds, S1 normal and S2 normal. No murmur heard.     No friction rub. No gallop.   Pulmonary:      Effort: Pulmonary effort is normal. No accessory muscle usage or respiratory distress.      Breath sounds: Normal breath sounds. No wheezing or rales.   Abdominal:      General: Bowel sounds are normal. There is no distension.      Palpations: Abdomen is soft.      Tenderness: There is no abdominal tenderness.   Musculoskeletal:         General: No deformity. Normal range of motion.      Right lower leg: No edema.      Left lower leg: No edema.   Skin:     " "General: Skin is warm and dry.      Findings: No lesion or rash.      Nails: There is no clubbing.   Neurological:      Mental Status: She is alert and oriented to person, place, and time.      Comments: Grossly normal    Psychiatric:         Behavior: Behavior is cooperative.       I have reviewed and made appropriate changes to the review of systems input by the medical assistant.    Vitals:    02/10/25 1114   BP: 126/82   BP Location: Left arm   Patient Position: Sitting   Pulse: 66   Resp: 20   SpO2: 97%   Weight: 45.8 kg (101 lb)   Height: 5' 2\" (1.575 m)       Patient Active Problem List   Diagnosis    Hypertension    Anxiety    Hypothyroidism    Depression, recurrent (HCC)    Diarrhea    Hemorrhoids    Anxiety    Alcohol abuse    Neurologic gait dysfunction    Syncope    Penetrating atherosclerotic ulcer of aorta (HCC)    Preop cardiovascular exam    Tobacco abuse    Falls frequently       Past Surgical History:   Procedure Laterality Date    CATARACT EXTRACTION      COLONOSCOPY      complete    DILATION AND CURETTAGE OF UTERUS  01/10/1978    HEMORROIDECTOMY      x2    LAPAROSCOPY      Diagnostic     IA EVASC RPR DPLMNT AORTO-AORTIC NDGFT Bilateral 1/27/2025    Procedure: EVAR, bilateral percutaneous femoral access;  Surgeon: Rodríguez Neff DO;  Location: [facility];  Service: Vascular    TUBAL LIGATION         Family History   Problem Relation Age of Onset    Heart disease Mother         Cardiac Disorder     Colonic polyp Mother     Hypertension Mother     Heart disease Father         Cardiac Dsiorder     Hypertension Father     Lung cancer Father     Breast cancer Sister 68    Stomach cancer Sister     Esophageal cancer Sister 56    Colonic polyp Brother     Lung cancer Brother     No Known Problems Son     Hepatitis Daughter         Alcoholic    No Known Problems Maternal Grandmother     No Known Problems Maternal Grandfather     No Known Problems Paternal Grandmother     No Known Problems Paternal " Grandfather     Arthritis Family     Lung cancer Family        Social History     Socioeconomic History    Marital status: /Civil Union     Spouse name: Not on file    Number of children: Not on file    Years of education: Not on file    Highest education level: Not on file   Occupational History    Not on file   Tobacco Use    Smoking status: Some Days     Current packs/day: 0.33     Average packs/day: 1 pack/day for 60.1 years (60.0 ttl pk-yrs)     Types: Cigarettes     Start date: 1965    Smokeless tobacco: Never   Vaping Use    Vaping status: Never Used   Substance and Sexual Activity    Alcohol use: Not Currently     Comment: Quit 6/2024    Drug use: No    Sexual activity: Yes   Other Topics Concern    Not on file   Social History Narrative    ** Merged History Encounter **         Drinks Coffee - 1-2 cup a day   Exercises Daily      Social Drivers of Health     Financial Resource Strain: Low Risk  (10/12/2023)    Overall Financial Resource Strain (CARDIA)     Difficulty of Paying Living Expenses: Not very hard   Food Insecurity: No Food Insecurity (1/27/2025)    Nursing - Inadequate Food Risk Classification     Worried About Running Out of Food in the Last Year: Not on file     Ran Out of Food in the Last Year: Not on file     Ran Out of Food in the Last Year: Never true   Transportation Needs: No Transportation Needs (1/27/2025)    Nursing - Transportation Risk Classification     Lack of Transportation: Not on file     Lack of Transportation: No   Physical Activity: Not on file   Stress: Not on file   Social Connections: Not on file   Intimate Partner Violence: Unknown (1/27/2025)    Nursing IPS     Feels Physically and Emotionally Safe: Not on file     Physically Hurt by Someone: Not on file     Humiliated or Emotionally Abused by Someone: Not on file     Physically Hurt by Someone: No     Hurt or Threatened by Someone: No   Housing Stability: Unknown (1/27/2025)    Nursing: Inadequate Housing Risk  Classification     Has Housing: Not on file     Worried About Losing Housing: Not on file     Unable to Get Utilities: Not on file     Unable to Pay for Housing in the Last Year: No     Has Housin       No Known Allergies      Current Outpatient Medications:     acetaminophen (TYLENOL) 325 mg tablet, Take 2 tablets (650 mg total) by mouth every 6 (six) hours as needed for mild pain, Disp: , Rfl: 0    ALPRAZolam (XANAX) 1 mg tablet, Take 1 tablet (1 mg total) by mouth 2 (two) times a day as needed for anxiety, Disp: 60 tablet, Rfl: 3    aspirin 81 mg chewable tablet, Chew 1 tablet (81 mg total) daily, Disp: 90 tablet, Rfl: 3    atorvastatin (LIPITOR) 40 mg tablet, take 1 tablet by mouth once daily, Disp: 90 tablet, Rfl: 1    citalopram (CeleXA) 10 mg tablet, take 1 tablet by mouth once daily, Disp: 100 tablet, Rfl: 1    cyanocobalamin (VITAMIN B-12) 100 MCG tablet, Take 100 mcg by mouth daily, Disp: , Rfl:     latanoprost (XALATAN) 0.005 % ophthalmic solution, instill 1 drop into both eyes every evening, Disp: , Rfl:     levothyroxine 50 mcg tablet, take 1 tablet by mouth once daily in THE EARLY MORNING, Disp: 100 tablet, Rfl: 0    metoprolol succinate (TOPROL-XL) 25 mg 24 hr tablet, take 1 tablet by mouth once daily, Disp: 90 tablet, Rfl: 1

## 2025-02-10 NOTE — ASSESSMENT & PLAN NOTE
80 yoF smoker with hypertension, hypothyroidism and v who underwent AAA EVAR repair Endologix AFX 22-60/13-40 device by Dr. Neff on 1/27/25. Surgery was without complication.  Patient presents for first postop visit after EVAR.    RE:  post-op EVAR repair    -No abdominal pain, back or groin pain.   -Walking is much as she wants to walk.  -Gaining wt as expected due decreasing smoking and trying to eat more.  -No problems with groin procedure sites.    -Exam: Abdomen flat. Non-tender, non-palp Ao.  2+ femoral and DP pulses    B endovascular femoral artery procedure sites are soft, non-tender. No Hematoma.     A/P Stable POD #14 after EVAR repair for AAA with atherosclerotic ulcer of the aorta    -Doing well after EVAR. No acute issues.   -Continue with aspirin 81 and atorvastatin 40.   -May engage in activity as tolerated.   -Complete smoking cessation encouraged  -Congratulated on efforts toward smoking cessation; down from 20 > 7 cigarettes/ day  -Patient education regarding AAA provided  -Discussed plan for surveillance  -Check CTA abdomen pelvis in about 1 month and follow-up with Dr. Neff    Orders for CTA abd/pelvis and EVAR du in 3 months and 9 months written

## 2025-03-07 ENCOUNTER — HOSPITAL ENCOUNTER (OUTPATIENT)
Dept: CT IMAGING | Facility: HOSPITAL | Age: 80
Discharge: HOME | End: 2025-03-07
Payer: COMMERCIAL

## 2025-03-07 DIAGNOSIS — Z86.79 S/P AAA REPAIR USING BIFURCATION GRAFT: ICD-10-CM

## 2025-03-07 DIAGNOSIS — Z95.828 S/P AAA REPAIR USING BIFURCATION GRAFT: ICD-10-CM

## 2025-03-07 DIAGNOSIS — I71.9 PENETRATING ATHEROSCLEROTIC ULCER OF AORTA (HCC): ICD-10-CM

## 2025-03-07 PROCEDURE — 74174 CTA ABD&PLVS W/CONTRAST: CPT

## 2025-03-07 RX ADMIN — IOHEXOL 85 ML: 350 INJECTION, SOLUTION INTRAVENOUS at 10:31

## 2025-03-10 ENCOUNTER — RESULTS FOLLOW-UP (OUTPATIENT)
Dept: VASCULAR SURGERY | Facility: CLINIC | Age: 80
End: 2025-03-10

## 2025-03-26 NOTE — PROGRESS NOTES
Name: Karli Maradiaga      : 1945      MRN: [id number]  Encounter Provider: Rodríguez Neff DO  Encounter Date: 3/27/2025   Encounter department: [facility] [place]  :  Assessment & Plan  Aortic thrombus (HCC)         Penetrating atherosclerotic ulcer of aorta (HCC)  80-year-old female presents for follow-up after EVAR.  I saw her earlier this year she had a large infrarenal penetrating aortic ulcer.  I recommended EVAR based on the size she had an AFX EVAR done 2 months ago which was uncomplicated.  AFX was done due to the narrow distal aorta since this time she has had no claudication no ischemic pain in her lower extremities her access sites in the groins have healed up well she also had a lower extremity arterial duplex to screen for popliteal aneurysms which was negative.  On exam today she has palpable femoral pulses she denies any specific complaints I discussed our surveillance protocol and the need for continued surveillance imaging.  She already has 3 in 9-month EVAR duplex scheduled and then we will proceed with her standard schedule after that.  She is still smoking even though she is cut back she was given a referral to the smoking cessation hotline last visit and she is working on continuing to completely quit.  Will have her follow-up with our vascular YARA in the office in 1 year.             History of Present Illness     Patient presents today to review EVAR duplex done 3/7/25. She is s/p EVAR on 25. Denies any acute abdominal pain or back pain.     HPI  Karli Maradiaga is a 80 y.o. female  History obtained from: patient    Review of Systems   Constitutional: Negative.    HENT: Negative.     Eyes: Negative.    Respiratory: Negative.     Cardiovascular: Negative.    Gastrointestinal:  Positive for abdominal pain.   Endocrine: Negative.    Genitourinary: Negative.    Musculoskeletal:  Positive for gait problem.   Skin: Negative.    Allergic/Immunologic: Negative.    Hematological:  "Negative.    Psychiatric/Behavioral: Negative.       I have reviewed the ROS above and made changes as needed.         Objective   /84 (BP Location: Left arm, Patient Position: Sitting)   Pulse 68   Ht 5' 2\" (1.575 m)   Wt 45.8 kg (101 lb)   BMI 18.47 kg/m²      Physical Exam    General  Exam: alert, awake, oriented, no distress, consistent with stated age    Integumentary  Exam: warm, dry, no gross lesions, no bruises and normal color    Adbomen  Exam: soft, non-tender, non-distended, no pulsatile abdominal masses, no abdominal bruit    Peripheral Vascular  Exam: no clubbing of the digits of the upper extremity, no cyanosis, no edema, both feet are warm, radial pulses 2+ bilaterally, skin well perfused, without and no varicosities.    No widened popliteal pulse noted bilaterally    Upper Extremity:  Palpation: Radial pulse- Bilateral 2+    Lower Extremity:  Palpation: Femoral pulse- Bilateral 2+         Popliteal pulse - Bilateral 2+        Dorsalis Pedis - Bilateral 2+         Femoral access sites soft    Neurologic  Exam:alert, non-focal, oriented x 3, cranial nerves II-XII grossly intact      Administrative Statements   I have spent a total time of 20 minutes in caring for this patient on the day of the visit/encounter including Counseling / Coordination of care, Documenting in the medical record, and Reviewing/placing orders in the medical record (including tests, medications, and/or procedures).  "

## 2025-03-27 ENCOUNTER — OFFICE VISIT (OUTPATIENT)
Dept: VASCULAR SURGERY | Facility: CLINIC | Age: 80
End: 2025-03-27

## 2025-03-27 VITALS
DIASTOLIC BLOOD PRESSURE: 84 MMHG | HEIGHT: 62 IN | HEART RATE: 68 BPM | SYSTOLIC BLOOD PRESSURE: 122 MMHG | WEIGHT: 101 LBS | BODY MASS INDEX: 18.58 KG/M2

## 2025-03-27 DIAGNOSIS — I71.9 PENETRATING ATHEROSCLEROTIC ULCER OF AORTA (HCC): ICD-10-CM

## 2025-03-27 DIAGNOSIS — I74.10 AORTIC THROMBUS (HCC): Primary | ICD-10-CM

## 2025-03-27 PROCEDURE — 99024 POSTOP FOLLOW-UP VISIT: CPT | Performed by: SURGERY

## 2025-03-27 NOTE — ASSESSMENT & PLAN NOTE
80-year-old female presents for follow-up after EVAR.  I saw her earlier this year she had a large infrarenal penetrating aortic ulcer.  I recommended EVAR based on the size she had an AFX EVAR done 2 months ago which was uncomplicated.  AFX was done due to the narrow distal aorta since this time she has had no claudication no ischemic pain in her lower extremities her access sites in the groins have healed up well she also had a lower extremity arterial duplex to screen for popliteal aneurysms which was negative.  On exam today she has palpable femoral pulses she denies any specific complaints I discussed our surveillance protocol and the need for continued surveillance imaging.  She already has 3 in 9-month EVAR duplex scheduled and then we will proceed with her standard schedule after that.  She is still smoking even though she is cut back she was given a referral to the smoking cessation hotline last visit and she is working on continuing to completely quit.  Will have her follow-up with our vascular YARA in the office in 1 year.

## 2025-04-28 ENCOUNTER — HOSPITAL ENCOUNTER (OUTPATIENT)
Dept: NON INVASIVE DIAGNOSTICS | Facility: CLINIC | Age: 80
Discharge: HOME | End: 2025-04-28
Payer: COMMERCIAL

## 2025-04-28 DIAGNOSIS — Z86.79 S/P AAA REPAIR USING BIFURCATION GRAFT: ICD-10-CM

## 2025-04-28 DIAGNOSIS — I71.9 PENETRATING ATHEROSCLEROTIC ULCER OF AORTA (HCC): ICD-10-CM

## 2025-04-28 DIAGNOSIS — Z95.828 S/P AAA REPAIR USING BIFURCATION GRAFT: ICD-10-CM

## 2025-04-28 PROCEDURE — 93923 UPR/LXTR ART STDY 3+ LVLS: CPT

## 2025-04-28 PROCEDURE — 93978 VASCULAR STUDY: CPT

## 2025-04-29 PROCEDURE — 93978 VASCULAR STUDY: CPT | Performed by: SURGERY

## 2025-05-08 DIAGNOSIS — I77.9 AORTA DISORDER (HCC): ICD-10-CM

## 2025-05-08 RX ORDER — ATORVASTATIN CALCIUM 40 MG/1
40 TABLET, FILM COATED ORAL DAILY
Qty: 90 TABLET | Refills: 1 | Status: SHIPPED | OUTPATIENT
Start: 2025-05-08

## 2025-05-08 NOTE — TELEPHONE ENCOUNTER
Reason for call:   [x] Refill   [] Prior Auth  [] Other:     Office:   [] PCP/Provider -   [x] Specialty/Provider - [facility]     Medication:     atorvastatin (LIPITOR) 40 mg  take 1 tablet by mouth once daily,          [id number]     Local Pharmacy   Does the patient have enough for 3 days?   [] Yes   [x] No - Send as HP to POD    Mail Away Pharmacy   Does the patient have enough for 10 days?   [] Yes   [] No - Send as HP to POD

## 2025-05-09 ENCOUNTER — VBI (OUTPATIENT)
Dept: ADMINISTRATIVE | Facility: OTHER | Age: 80
End: 2025-05-09

## 2025-05-09 NOTE — TELEPHONE ENCOUNTER
Patient contacted to schedule Annual Wellness Visit.   Patient declined to schedule appointment.     Thank you.  Alma Rodriguez  [facility]

## 2025-05-14 DIAGNOSIS — E03.9 HYPOTHYROIDISM, UNSPECIFIED TYPE: ICD-10-CM

## 2025-05-15 RX ORDER — LEVOTHYROXINE SODIUM 50 UG/1
TABLET ORAL
Qty: 100 TABLET | Refills: 1 | Status: SHIPPED | OUTPATIENT
Start: 2025-05-15

## 2025-07-23 ENCOUNTER — VBI (OUTPATIENT)
Dept: ADMINISTRATIVE | Facility: OTHER | Age: 80
End: 2025-07-23

## 2025-07-23 DIAGNOSIS — I77.9 AORTA DISORDER (HCC): ICD-10-CM

## 2025-07-23 DIAGNOSIS — Z01.818 PRE-OP EVALUATION: ICD-10-CM

## 2025-07-23 DIAGNOSIS — I10 HYPERTENSION, UNSPECIFIED TYPE: ICD-10-CM

## 2025-07-23 RX ORDER — METOPROLOL SUCCINATE 25 MG/1
25 TABLET, EXTENDED RELEASE ORAL DAILY
Qty: 90 TABLET | Refills: 1 | Status: SHIPPED | OUTPATIENT
Start: 2025-07-23

## 2025-07-23 NOTE — TELEPHONE ENCOUNTER
Reason for call:   [x] Refill   [] Prior Auth  [] Other:     Office:   [] PCP/Provider -   [x] Specialty/Provider - Roberto Gandhi MD    Medication: metoprolol succinate (TOPROL-XL) 25 mg 24 hr tablet     Dose/Frequency: take 1 tablet by mouth once daily     Quantity: 90 tablet (90 day supply)     Pharmacy: [id number]     Local Pharmacy   Does the patient have enough for 3 days?   [x] Yes   [] No - Send as HP to POD    Mail Away Pharmacy   Does the patient have enough for 10 days?   [] Yes   [] No - Send as HP to POD

## 2025-07-23 NOTE — TELEPHONE ENCOUNTER
Patient contacted to schedule Annual Wellness Visit.   Patient declined to schedule appointment.     Thank you.  Samra Singh MA  [facility]

## 2025-08-13 ENCOUNTER — TELEPHONE (OUTPATIENT)
Dept: FAMILY MEDICINE CLINIC | Facility: CLINIC | Age: 80
End: 2025-08-13

## (undated) DEVICE — GUIDEWIRE AMPLATZ .035 180CM 6CM ST SS

## (undated) DEVICE — SNAP KOVER: Brand: UNBRANDED

## (undated) DEVICE — FLEXOR, CHECK-FLO, INTRODUCER SET: Brand: FLEXOR

## (undated) DEVICE — FLEXCIL HIGH PRESSURE CONTRAST INJECTION LINE: Brand: NAMIC

## (undated) DEVICE — 3M™ IOBAN™ 2 ANTIMICROBIAL INCISE DRAPE 6651EZ: Brand: IOBAN™ 2

## (undated) DEVICE — INFUSER BAG 500ML

## (undated) DEVICE — EXOFIN PRECISION PEN HIGH VISCOSITY TOPICAL SKIN ADHESIVE: Brand: EXOFIN PRECISION PEN, 1G

## (undated) DEVICE — SUT MONOCRYL 4-0 PS-2 18 IN Y496G

## (undated) DEVICE — TRAY FOLEY 16FR URIMETER SURESTEP

## (undated) DEVICE — PACK CUSTOM CARDIOVASCULAR

## (undated) DEVICE — Device

## (undated) DEVICE — GLOVE SRG BIOGEL ECLIPSE 7.5

## (undated) DEVICE — PRESTO™ INFLATION DEVICE: Brand: PRESTO

## (undated) DEVICE — PROBE COVER: Brand: STERILE PROBE COVER

## (undated) DEVICE — BEACON TIP TORCON NB ADVANTAGE CATHETER: Brand: BEACON TIP TORCON NB

## (undated) DEVICE — ELECTRODE BLADE MOD E-Z CLEAN 2.5IN 6.4CM -0012M

## (undated) DEVICE — PINNACLE R/O II INTRODUCER SHEATH WITH RADIOPAQUE MARKER: Brand: PINNACLE

## (undated) DEVICE — NON-DEHP HIGH FLOW RATE EXTENSION SET, MALE LUER LOCK ADAPTER

## (undated) DEVICE — CATH DIAG 5FR .035 70CM PIG CSC 20

## (undated) DEVICE — STOPCOCK 4 WAY LL HIGH PRESSURE

## (undated) DEVICE — CATH BAL CHARGER 10 X 40MM X 75CM

## (undated) DEVICE — CATH DIAG 5FR .035 100CM PIG CSC 20

## (undated) DEVICE — SYRINGE 50ML LL

## (undated) DEVICE — SUTURE BOOTS YELLOW

## (undated) DEVICE — PROXIMATE PLUS MD MULTI-DIRECTIONAL RELEASE SKIN STAPLERS CONTAINS 35 STAINLESS STEEL STAPLES APPROXIMATE CLOSED DIMENSIONS: 6.9MM X 3.9MM WIDE: Brand: PROXIMATE

## (undated) DEVICE — SYRINGE KIT,PACKAGED,,150FT,MK 7(ANGIO-ARTERION, 150ML SYR KIT W/QFT,MC)(60729385): Brand: MEDRAD® MARK 7 ARTERION DISPOSABLE SYRINGE 150 ML WITH QUICK FILL TUBE

## (undated) DEVICE — DILATOR: Brand: COOK

## (undated) DEVICE — STOPCOCK: Brand: NAMIC

## (undated) DEVICE — MICROPUNCTURE INTRODUCER SET SILHOUETTE TRANSITIONLESS PUSH-PLUS DESIGN - STIFFENED CANNULA WITH NITINOL WIRE GUIDE: Brand: MICROPUNCTURE

## (undated) DEVICE — GUIDEWIRE LUNDERQUIST TSCMG-35-300-LESDC

## (undated) DEVICE — MICROPUNCTURE 501

## (undated) DEVICE — PACK UNIVERSAL DRAPES SUB-Q ICD

## (undated) DEVICE — 3M™ STERI-STRIP™ REINFORCED ADHESIVE SKIN CLOSURES, R1547, 1/2 IN X 4 IN (12 MM X 100 MM), 6 STRIPS/ENVELOPE: Brand: 3M™ STERI-STRIP™

## (undated) DEVICE — INTENDED FOR TISSUE SEPARATION, AND OTHER PROCEDURES THAT REQUIRE A SHARP SURGICAL BLADE TO PUNCTURE OR CUT.: Brand: BARD-PARKER SAFETY BLADES SIZE 15, STERILE

## (undated) DEVICE — CATH BAL OCCLUSION CODA 34

## (undated) DEVICE — BETHLEHEM MAJOR GENERAL PACK: Brand: CARDINAL HEALTH

## (undated) DEVICE — SPONGE 4 X 4 XRAY 16 PLY STRL LF RFD

## (undated) DEVICE — FLUID MANAGEMENT KIT - IR

## (undated) DEVICE — RADIFOCUS TORQUE DEVICE MULTI-TORQUE VISE: Brand: RADIFOCUS TORQUE DEVICE

## (undated) DEVICE — IV SET 15 DROP STERILE 0/Y GRAVITY

## (undated) DEVICE — PAD GROUNDING DUAL ADULT

## (undated) RX ORDER — LATANOPROST 50 UG/ML: 1 SOLUTION/ DROPS OPHTHALMIC EVERY EVENING

## (undated) RX ORDER — BIMATOPROST 0.3 MG/ML: 1 SOLUTION/ DROPS OPHTHALMIC EVERY EVENING

## (undated) RX ORDER — BRIMONIDINE TARTRATE 2 MG/MG: 1 SOLUTION/ DROPS OPHTHALMIC